# Patient Record
Sex: FEMALE | Race: WHITE | Employment: OTHER | URBAN - METROPOLITAN AREA
[De-identification: names, ages, dates, MRNs, and addresses within clinical notes are randomized per-mention and may not be internally consistent; named-entity substitution may affect disease eponyms.]

---

## 2017-01-12 DIAGNOSIS — F41.9 ANXIETY: Primary | ICD-10-CM

## 2017-01-12 NOTE — TELEPHONE ENCOUNTER
Routing refill request to provider for review/approval because:  Drug not on the [facility] refill protocol     Viktoriya Sharp RN

## 2017-01-12 NOTE — TELEPHONE ENCOUNTER
Lorazepam      Last Written Prescription Date:  11/02/2016  Last Fill Quantity: 30,   # refills: 0  Last Office Visit with [facility], [facility] or [facility] prescribing provider: 10/05/2016  Future Office visit:    Next 5 appointments (look out 90 days)     Jan 12, 2017  3:30 PM   [facility] Visit with JONG Delong   [facility]    [address] [place] [address]   [phone]

## 2017-01-13 RX ORDER — LORAZEPAM 0.5 MG/1
0.5 TABLET ORAL DAILY PRN
Qty: 30 TABLET | Refills: 0 | Status: SHIPPED | OUTPATIENT
Start: 2017-01-13 | End: 2017-05-02

## 2017-05-02 DIAGNOSIS — F41.9 ANXIETY: ICD-10-CM

## 2017-05-02 NOTE — TELEPHONE ENCOUNTER
Lorazepam      Last Written Prescription Date:  01/13/2017  Last Fill Quantity: 30,   # refills: 0  Last Office Visit with [facility] or [facility] prescribing provider: 10/05/2016  Future Office visit:    Next 5 appointments (look out 90 days)     May 08, 2017  3:30 PM CDT   [facility] Visit with Jade [facility]ong   [facility]    [address], [place] [address]   [phone]                   Routing refill request to provider for review/approval because:  Drug not on the [facility], [facility] or [facility] refill protocol or controlled substance    Liang MAXWELL (R)

## 2017-05-03 RX ORDER — LORAZEPAM 0.5 MG/1
0.5 TABLET ORAL DAILY PRN
Qty: 30 TABLET | Refills: 0 | Status: SHIPPED | OUTPATIENT
Start: 2017-05-03 | End: 2018-01-11

## 2017-05-08 ENCOUNTER — OFFICE VISIT (OUTPATIENT)
Dept: AUDIOLOGY | Facility: CLINIC | Age: 82
End: 2017-05-08
Payer: COMMERCIAL

## 2017-05-08 DIAGNOSIS — H90.3 SENSORINEURAL HEARING LOSS, BILATERAL: Primary | ICD-10-CM

## 2017-05-08 PROCEDURE — V5299 HEARING SERVICE: HCPCS | Performed by: AUDIOLOGIST

## 2017-05-08 NOTE — PROGRESS NOTES
84 year old female comes in with her son for a hearing aid recheck. She is currently wearing 2016 Phonak VoodooVox V50-P hearing aids with custom earmolds. Patient reports that her earmold tubing is broken.     Replaced stiff, plugged tubing bilaterally. Reviewed cleaning with patient and her son. Verified hearing aid functionality.  See chart in the hearing aid room.     Return to clinic as needed.    Berna SMALL, #[id number]

## 2017-05-08 NOTE — MR AVS SNAPSHOT
"              After Visit Summary   2017    Sherri Bender    MRN: [id number]           Patient Information     Date Of Birth          1932        Visit Information        Provider Department      2017 3:30 PM Berna Espinoza [facility]        Today's Diagnoses     Sensorineural hearing loss, bilateral    -  1       Follow-ups after your visit        Who to contact     If you have questions or need follow up information about today's clinic visit or your schedule please contact [facility] directly at [phone].  Normal or non-critical lab and imaging results will be communicated to you by Cognitive Codehart, letter or phone within 4 business days after the clinic has received the results. If you do not hear from us within 7 days, please contact the clinic through Cognitive Codehart or phone. If you have a critical or abnormal lab result, we will notify you by phone as soon as possible.  Submit refill requests through Organic Society or call your pharmacy and they will forward the refill request to us. Please allow 3 business days for your refill to be completed.          Additional Information About Your Visit        MyChart Information     Organic Society lets you send messages to your doctor, view your test results, renew your prescriptions, schedule appointments and more. To sign up, go to [email or web address] . Click on \"Log in\" on the left side of the screen, which will take you to the Welcome page. Then click on \"Sign up Now\" on the right side of the page.     You will be asked to enter the access code listed below, as well as some personal information. Please follow the directions to create your username and password.     Your access code is: [email or web address]  Expires: 2017  3:53 PM     Your access code will  in 90 days. If you need help or a new code, please call your [facility] or [phone].        Care EveryWhere ID     This is your Care EveryWhere ID. This could be used " by other organizations to access your [facility] medical records  [id number]         Blood Pressure from Last 3 Encounters:   10/05/16 125/74   11/10/15 142/73   10/27/15 130/78    Weight from Last 3 Encounters:   10/05/16 173 lb (78.5 kg)   11/10/15 172 lb 6.4 oz (78.2 kg)   10/27/15 168 lb 6.4 oz (76.4 kg)              We Performed the Following     HEARING AID CHECK/NO CHARGE        Primary Care Provider Office Phone # Fax #    Kameron Cool [phone]       [facility] [address] [address] [address]        Thank you!     Thank you for choosing [facility]  for your care. Our goal is always to provide you with excellent care. Hearing back from our patients is one way we can continue to improve our services. Please take a few minutes to complete the written survey that you may receive in the mail after your visit with us. Thank you!             Your Updated Medication List - Protect others around you: Learn how to safely use, store and throw away your medicines at www.disposemymeds.org.          This list is accurate as of: 5/8/17  3:53 PM.  Always use your most recent med list.                   Brand Name Dispense Instructions for use    ASPIRIN CAPS 81 MG OR      1 TABLET DAILY       CALCIUM + D PO      1 tablet by mouth daily       * donepezil 5 MG tablet    ARICEPT    30 tablet    Take 1 tablet (5 mg) by mouth At Bedtime (Needs follow-up appointment for this medication)       * donepezil 10 MG tablet    ARICEPT    90 tablet    Take 1 tablet (10 mg) by mouth At Bedtime       FISH OIL PO      1 capsule by mouth daily       GLUCOSAMINE CHONDRO COMPLEX OR      1 CAPSULE ORALLY DAILY       hydrochlorothiazide 25 MG tablet    HYDRODIURIL    90 tablet    Take 1 tablet (25 mg) by mouth daily       LORazepam 0.5 MG tablet    ATIVAN    30 tablet    Take 1 tablet (0.5 mg) by mouth daily as needed for anxiety       losartan 25 MG tablet    COZAAR    90 tablet    Take 1  tablet (25 mg) by mouth daily       MULTIVITAMIN PO      1 TABLET DAILY       sertraline 50 MG tablet    ZOLOFT    90 tablet    Take 1 tablet (50 mg) by mouth daily       tolterodine 2 MG tablet    DETROL    90 tablet    1 tab daily       VITAMIN C PO      500 mg daily       * Notice:  This list has 2 medication(s) that are the same as other medications prescribed for you. Read the directions carefully, and ask your doctor or other care provider to review them with you.

## 2017-08-10 DIAGNOSIS — F41.9 ANXIETY: ICD-10-CM

## 2017-08-10 DIAGNOSIS — F03.A0 MILD DEMENTIA (H): ICD-10-CM

## 2017-08-10 RX ORDER — DONEPEZIL HYDROCHLORIDE 10 MG/1
10 TABLET, FILM COATED ORAL AT BEDTIME
Qty: 90 TABLET | Refills: 0 | Status: SHIPPED | OUTPATIENT
Start: 2017-08-10 | End: 2018-01-11

## 2017-08-10 NOTE — TELEPHONE ENCOUNTER
Sertraline     Last Written Prescription Date: 10/05/2016  Last Fill Quantity: 90, # refills: 3  Last Office Visit with [facility] primary care provider:  10/05/2016   Next 5 appointments (look out 90 days)     Aug 18, 2017  3:20 PM CDT   Office Visit with Kameron Cool MD   [facility]    [address] [address]   [phone]                   Last PHQ-9 score on record=   PHQ-9 SCORE 10/27/2015   Total Score -   Total Score 2     PHQ-9 SCORE 1/30/2015 9/3/2015 10/27/2015   Total Score 0 - -   Total Score - 3 2     MASON-7 SCORE 1/30/2015 9/3/2015 10/27/2015   Total Score 1 - -   Total Score - 1 1       Liang MAXWELL (R)

## 2017-08-10 NOTE — TELEPHONE ENCOUNTER
Donepezil      Last Written Prescription Date: 10/05/2016  Last Fill Quantity: 90, # refills: 3  Last Office Visit with [facility], [facility] or [facility] prescribing provider: 10/05/2016  Next 5 appointments (look out 90 days)     Aug 18, 2017  3:20 PM CDT   Office Visit with Kameron Cool MD   [facility]    [address] [address]   [phone]                   Potassium   Date Value Ref Range Status   09/03/2015 3.6 3.4 - 5.3 mmol/L Final     Creatinine   Date Value Ref Range Status   09/03/2015 0.65 0.52 - 1.04 mg/dL Final     BP Readings from Last 3 Encounters:   10/05/16 125/74   11/10/15 142/73   10/27/15 130/78       Liang MAXWELL (R)

## 2017-08-23 ENCOUNTER — TELEPHONE (OUTPATIENT)
Dept: FAMILY MEDICINE | Facility: CLINIC | Age: 82
End: 2017-08-23

## 2017-08-23 NOTE — TELEPHONE ENCOUNTER
Panel Management Review      Patient has the following on her problem list:     Depression / Dysthymia review  PHQ-9 SCORE 1/30/2015 9/3/2015 10/27/2015   Total Score 0 - -   Total Score - 3 2      Patient is due for:  PHQ9 and DAP    Hypertension   Last three blood pressure readings:  BP Readings from Last 3 Encounters:   10/05/16 125/74   11/10/15 142/73   10/27/15 130/78     Blood pressure: Passed    HTN Guidelines:  Age 18-59 BP range:  Less than 140/90  Age 60-85 with Diabetes:  Less than 140/90  Age 60-85 without Diabetes:  less than 150/90          Composite cancer screening  Chart review shows that this patient is due/due soon for the following None  Summary:    Patient is due/failing the following:   DAP and PHQ9    Action needed:   Patient needs to do PHQ9.    Type of outreach:    Phone, spoke to patient.  she has appt. this week will do it then     Questions for provider review:    None                                                                                                                                    Meron Petit CMA       Chart routed to none .

## 2017-10-03 ENCOUNTER — OFFICE VISIT (OUTPATIENT)
Dept: AUDIOLOGY | Facility: CLINIC | Age: 82
End: 2017-10-03
Payer: COMMERCIAL

## 2017-10-03 DIAGNOSIS — H90.3 SENSORINEURAL HEARING LOSS, BILATERAL: Primary | ICD-10-CM

## 2017-10-03 PROCEDURE — 99207 ZZC NO CHARGE LOS: CPT | Performed by: AUDIOLOGIST

## 2017-10-03 PROCEDURE — V5299 HEARING SERVICE: HCPCS | Performed by: AUDIOLOGIST

## 2017-10-03 NOTE — PROGRESS NOTES
85 year old female comes in with her son for in-office repair of her 2016 Phonak Bolero V50-P hearing aids. Patient reports that the right hearing aid stopped working about 1 month ago.     Replaced earhooks and earmold tubing bilaterally. Verified hearing aid functionality.      An otoscopic examination was done and revealed cerumen impaction.     Recommend ENT removal of cermen.     See chart in the hearing aid room.     Berna TYLER-AGUEDA, #[id number]

## 2017-10-03 NOTE — MR AVS SNAPSHOT
"              After Visit Summary   10/3/2017    Sherri Bender    MRN: [id number]           Patient Information     Date Of Birth          6/6/1932        Visit Information        Provider Department      10/3/2017 2:00 PM Berna Espinoza [facility]D [facility]        Today's Diagnoses     Sensorineural hearing loss, bilateral    -  1       Follow-ups after your visit        Your next 10 appointments already scheduled     Oct 11, 2017  2:15 PM CDT   New Visit with Giana Goodrich MD   [facility]    [address] [address]   [phone]              Who to contact     If you have questions or need follow up information about today's clinic visit or your schedule please contact [facility] directly at [phone].  Normal or non-critical lab and imaging results will be communicated to you by MyChart, letter or phone within 4 business days after the clinic has received the results. If you do not hear from us within 7 days, please contact the clinic through MyChart or phone. If you have a critical or abnormal lab result, we will notify you by phone as soon as possible.  Submit refill requests through GupShup or call your pharmacy and they will forward the refill request to us. Please allow 3 business days for your refill to be completed.          Additional Information About Your Visit        MyChart Information     GupShup lets you send messages to your doctor, view your test results, renew your prescriptions, schedule appointments and more. To sign up, go to [email or web address] . Click on \"Log in\" on the left side of the screen, which will take you to the Welcome page. Then click on \"Sign up Now\" on the right side of the page.     You will be asked to enter the access code listed below, as well as some personal information. Please follow the directions to create your username and password.     Your access code is: " [email or web address]  Expires: 2018  4:40 PM     Your access code will  in 90 days. If you need help or a new code, please call your [facility] clinic or [phone].        Care EveryWhere ID     This is your Care EveryWhere ID. This could be used by other organizations to access your [facility] medical records  [id number]         Blood Pressure from Last 3 Encounters:   10/05/16 125/74   11/10/15 142/73   10/27/15 130/78    Weight from Last 3 Encounters:   10/05/16 173 lb (78.5 kg)   11/10/15 172 lb 6.4 oz (78.2 kg)   10/27/15 168 lb 6.4 oz (76.4 kg)              We Performed the Following     HEARING AID CHECK/NO CHARGE        Primary Care Provider Office Phone # Fax #    Kameron Cool [phone] [address] [address]        Equal Access to Services     [facility] : Hadii aad ku hadasho Soomaali, waaxda luqadaha, qaybta kaalmada adeegyada, waxay idiin hayaan luis meg khmaria antonia lalinda . So [facility] [phone].    ATENCIÓN: Si habla español, tiene a osullivan disposición servicios gratuitos de asistencia lingüística. Llame al [phone].    We comply with applicable federal civil rights laws and [place] laws. We do not discriminate on the basis of race, color, national origin, age, disability, sex, sexual orientation, or gender identity.            Thank you!     Thank you for choosing [facility]  for your care. Our goal is always to provide you with excellent care. Hearing back from our patients is one way we can continue to improve our services. Please take a few minutes to complete the written survey that you may receive in the mail after your visit with us. Thank you!             Your Updated Medication List - Protect others around you: Learn how to safely use, store and throw away your medicines at www.disposemymeds.org.          This list is accurate as of: 10/3/17  4:40 PM.  Always use your most recent med list.                   Brand Name Dispense  Instructions for use Diagnosis    ASPIRIN CAPS 81 MG OR      1 TABLET DAILY        CALCIUM + D PO      1 tablet by mouth daily        * donepezil 5 MG tablet    ARICEPT    30 tablet    Take 1 tablet (5 mg) by mouth At Bedtime (Needs follow-up appointment for this medication)    Mild dementia       * donepezil 10 MG tablet    ARICEPT    90 tablet    Take 1 tablet (10 mg) by mouth At Bedtime    Mild dementia       FISH OIL PO      1 capsule by mouth daily        GLUCOSAMINE CHONDRO COMPLEX OR      1 CAPSULE ORALLY DAILY        hydrochlorothiazide 25 MG tablet    HYDRODIURIL    90 tablet    Take 1 tablet (25 mg) by mouth daily    Edema       LORazepam 0.5 MG tablet    ATIVAN    30 tablet    Take 1 tablet (0.5 mg) by mouth daily as needed for anxiety    Anxiety       losartan 25 MG tablet    COZAAR    90 tablet    Take 1 tablet (25 mg) by mouth daily    HTN, goal below 150/90       MULTIVITAMIN PO      1 TABLET DAILY        sertraline 50 MG tablet    ZOLOFT    30 tablet    Take 1 tablet (50 mg) by mouth daily (Needs follow-up appointment for this medication)    Anxiety       tolterodine 2 MG tablet    DETROL    90 tablet    1 tab daily    Irritable bladder       VITAMIN C PO      500 mg daily        * Notice:  This list has 2 medication(s) that are the same as other medications prescribed for you. Read the directions carefully, and ask your doctor or other care provider to review them with you.

## 2017-10-11 ENCOUNTER — OFFICE VISIT (OUTPATIENT)
Dept: OTOLARYNGOLOGY | Facility: CLINIC | Age: 82
End: 2017-10-11
Payer: COMMERCIAL

## 2017-10-11 VITALS
TEMPERATURE: 97.8 F | SYSTOLIC BLOOD PRESSURE: 136 MMHG | DIASTOLIC BLOOD PRESSURE: 82 MMHG | HEART RATE: 79 BPM | HEIGHT: 63 IN | WEIGHT: 173 LBS | BODY MASS INDEX: 30.65 KG/M2

## 2017-10-11 DIAGNOSIS — H90.3 BILATERAL SENSORINEURAL HEARING LOSS: ICD-10-CM

## 2017-10-11 DIAGNOSIS — H61.23 BILATERAL IMPACTED CERUMEN: Primary | ICD-10-CM

## 2017-10-11 PROCEDURE — 69210 REMOVE IMPACTED EAR WAX UNI: CPT | Performed by: OTOLARYNGOLOGY

## 2017-10-11 PROCEDURE — 99203 OFFICE O/P NEW LOW 30 MIN: CPT | Mod: 25 | Performed by: OTOLARYNGOLOGY

## 2017-10-11 ASSESSMENT — PAIN SCALES - GENERAL: PAINLEVEL: NO PAIN (0)

## 2017-10-11 NOTE — NURSING NOTE
"Initial /82 (BP Location: Right arm, Patient Position: Sitting, Cuff Size: Adult Regular)  Pulse 79  Temp 97.8  F (36.6  C) (Oral)  Ht 1.607 m (5' 3.25\")  Wt 78.5 kg (173 lb)  BMI 30.4 kg/m2 Estimated body mass index is 30.4 kg/(m^2) as calculated from the following:    Height as of this encounter: 1.607 m (5' 3.25\").    Weight as of this encounter: 78.5 kg (173 lb). .    Silvana Calderón CMA    "

## 2017-10-11 NOTE — PROGRESS NOTES
History of Present Illness - Sherri Bender is a 85 year old female who has known bilateral sensorineural hearing loss, presented recently to Dr. Espinoza in Audiology for hearing evaluation. She was found to have bilateral cerumen impactions, which she felt were interfering with her ability to use her hearing aids well. She denies any ear pain or otorrhea.    Past Medical History -   Patient Active Problem List   Diagnosis     SENSONRL HEAR LOSS,BILAT     Osteopenia     Varicose vein of leg     Loss of height     Family history of ischemic heart disease     S/P lumpectomy of breast     CARDIOVASCULAR SCREENING; LDL GOAL LESS THAN 160     [place] (hard of hearing)     Anxiety     Advanced directives, counseling/discussion     Mild major depression (H)     HTN, goal below 150/90     Memory changes     Mild dementia       Current Medications -   Current Outpatient Prescriptions:      sertraline (ZOLOFT) 50 MG tablet, Take 1 tablet (50 mg) by mouth daily (Needs follow-up appointment for this medication), Disp: 30 tablet, Rfl: 0     donepezil (ARICEPT) 10 MG tablet, Take 1 tablet (10 mg) by mouth At Bedtime, Disp: 90 tablet, Rfl: 0     LORazepam (ATIVAN) 0.5 MG tablet, Take 1 tablet (0.5 mg) by mouth daily as needed for anxiety, Disp: 30 tablet, Rfl: 0     losartan (COZAAR) 25 MG tablet, Take 1 tablet (25 mg) by mouth daily, Disp: 90 tablet, Rfl: 3     hydrochlorothiazide (HYDRODIURIL) 25 MG tablet, Take 1 tablet (25 mg) by mouth daily, Disp: 90 tablet, Rfl: 3     donepezil (ARICEPT) 5 MG tablet, Take 1 tablet (5 mg) by mouth At Bedtime (Needs follow-up appointment for this medication), Disp: 30 tablet, Rfl: 0     tolterodine (DETROL) 2 MG tablet, 1 tab daily, Disp: 90 tablet, Rfl: 0     VITAMIN C OR, 500 mg daily, Disp: , Rfl:      FISH OIL OR, 1 capsule by mouth daily, Disp: , Rfl:      CALCIUM + D OR, 1 tablet by mouth daily, Disp: , Rfl:      ASPIRIN CAPS 81 MG OR, 1 TABLET DAILY, Disp: , Rfl:       "MULTIVITAMIN OR, 1 TABLET DAILY, Disp: , Rfl:      GLUCOSAMINE CHONDRO COMPLEX OR, 1 CAPSULE ORALLY DAILY, Disp: , Rfl:     Allergies -   Allergies   Allergen Reactions     Nkda [No Known Drug Allergies]        Social History -   Social History     Social History     Marital status:      Spouse name: N/A     Number of children: N/A     Years of education: N/A     Social History Main Topics     Smoking status: Former Smoker     Smokeless tobacco: Never Used      Comment: 1972     Alcohol use Yes      Comment: rarely     Drug use: No     Sexual activity: No     Other Topics Concern     Parent/Sibling W/ Cabg, Mi Or Angioplasty Before 65f 55m? No     Social History Narrative       Family History -   Family History   Problem Relation Age of Onset     CANCER Mother      skin     HEART DISEASE Mother      HEART DISEASE Father      Allergies Brother      CANCER Brother      lung       Review of Systems - As per HPI and PMHx, otherwise 7 system review of the head and neck negative. 10+ system review negative.    Physical Exam  /82 (BP Location: Right arm, Patient Position: Sitting, Cuff Size: Adult Regular)  Pulse 79  Temp 97.8  F (36.6  C) (Oral)  Ht 1.607 m (5' 3.25\")  Wt 78.5 kg (173 lb)  BMI 30.4 kg/m2  General - The patient is well nourished and well developed, and appears to have good nutritional status.  Alert and oriented to person and place, answers questions and cooperates with examination appropriately.   Head and Face - Normocephalic and atraumatic, with no gross asymmetry noted of the contour of the facial features.  The facial nerve is intact, with strong symmetric movements.  Voice and Breathing - The patient was breathing comfortably without the use of accessory muscles. There was no wheezing, stridor, or stertor.  The patients voice was clear and strong, and had appropriate pitch and quality.  Ears - Bilateral pinna and EACs with normal appearing overlying skin. Tympanic membrane intact " with good mobility on pneumatic otoscopy bilaterally. Bony landmarks of the ossicular chain are normal. The tympanic membranes are normal in appearance. No retraction, perforation, or masses.  No fluid or purulence was seen in the external canal or the middle ear. Bilateral cerumen impactions.  Eyes - Extraocular movements intact.  Sclera were not icteric or injected, conjunctiva were pink and moist.  Mouth - Examination of the oral cavity showed pink, healthy oral mucosa. No lesions or ulcerations noted.  The tongue was mobile and midline, and the dentition were in good condition.    Throat - The walls of the oropharynx were smooth, pink, moist, symmetric, and had no lesions or ulcerations.  The tonsillar pillars and soft palate were symmetric.  The uvula was midline on elevation.  Neck - Normal midline excursion of the laryngotracheal complex during swallowing.  Full range of motion on passive movement.  Palpation of the occipital, submental, submandibular, internal jugular chain, and supraclavicular nodes did not demonstrate any abnormal lymph nodes or masses.  The carotid pulse was palpable bilaterally.  Palpation of the thyroid was soft and smooth, with no nodules or goiter appreciated.  The trachea was mobile and midline.  Nose - External contour is symmetric, no gross deflection or scars.  Nasal mucosa is pink and moist with no abnormal mucus.  The septum was midline and non-obstructive, turbinates of normal size and position.  No polyps, masses, or purulence noted on examination.    Procedure: Cerumen Disimpaction  Diagnosis: Cerumen Impaction    Procedure and Findings  Ears - On examination of the ears, I found that the  ears were impacted with dense cerumen obscuring visualization of the right and left TM.  Therefore, I positioned the patient and I used the binocular surgical microscope to assist in visualization of the affected ear(s).  I began by using a cerumen loop to gently lift the edges of the  cerumen mass away from the walls of the external canal. Once the mass was loose enough, the entire plug was pulled from the canal with microforceps.  The tympanic membrane was intact without sign of perforation or middle ear effusion and minimal ear canal trauma.      Assessment - Sherri Bender is a 85 year old female with bilateral cerumen impaction that was removed. After this, TM was visualized and appears normal. Patient was not sure if her hearing was improved or not, but after placing hearing aids, she was able to hearing the test beeps, which she couldn't hear before.  Advised patient to return to clinic should she have any future issues with her hearing aids or decreases in hearing.     This document serves as a record of the services and decisions personally performed and made by Dr. Giana Goodrich MD. It was created on his behalf by Noemy Urrutia, a trained medical scribe. The creation of this document is based the provider's statements to the medical scribe.  Noemy Urrutia 2:38 PM 10/11/2017    Provider:   The information in this document, created by the medical scribe for me, accurately reflects the services I personally performed and the decisions made by me. I have reviewed and approved this document for accuracy prior to leaving the patient care area.  Dr. Giana Goodrich MD 2:38 PM 10/11/2017    Dr. Giana Goodrich MD  Otolaryngology  [facility]

## 2017-10-11 NOTE — MR AVS SNAPSHOT
"              After Visit Summary   10/11/2017    Sherri Bender    MRN: [id number]           Patient Information     Date Of Birth          1932        Visit Information        Provider Department      10/11/2017 2:15 PM Giana Goodrich MD [facility]        Today's Diagnoses     Bilateral impacted cerumen    -  1    Bilateral sensorineural hearing loss          Care Instructions    Per Physician's instructions            Follow-ups after your visit        Who to contact     If you have questions or need follow up information about today's clinic visit or your schedule please contact [facility] directly at [phone].  Normal or non-critical lab and imaging results will be communicated to you by MyChart, letter or phone within 4 business days after the clinic has received the results. If you do not hear from us within 7 days, please contact the clinic through [employer] or phone. If you have a critical or abnormal lab result, we will notify you by phone as soon as possible.  Submit refill requests through [employer] or call your pharmacy and they will forward the refill request to us. Please allow 3 business days for your refill to be completed.          Additional Information About Your Visit        MyChart Information     [employer] lets you send messages to your doctor, view your test results, renew your prescriptions, schedule appointments and more. To sign up, go to [email or web address] . Click on \"Log in\" on the left side of the screen, which will take you to the Welcome page. Then click on \"Sign up Now\" on the right side of the page.     You will be asked to enter the access code listed below, as well as some personal information. Please follow the directions to create your username and password.     Your access code is: [email or web address]  Expires: 2018  4:40 PM     Your access code will  in 90 days. If you need help or a new code, please call your [facility] or " "[phone].        Care EveryWhere ID     This is your Care EveryWhere ID. This could be used by other organizations to access your [facility] medical records  [id number]        Your Vitals Were     Pulse Temperature Height BMI (Body Mass Index)          79 97.8  F (36.6  C) (Oral) 1.607 m (5' 3.25\") 30.4 kg/m2         Blood Pressure from Last 3 Encounters:   10/11/17 136/82   10/05/16 125/74   11/10/15 142/73    Weight from Last 3 Encounters:   10/11/17 78.5 kg (173 lb)   10/05/16 78.5 kg (173 lb)   11/10/15 78.2 kg (172 lb 6.4 oz)              We Performed the Following     Remove [facility]        Primary Care Provider Office Phone # Fax #    Kameron Cool [phone] [address] [facility] [address]        Equal Access to Services     [facility]: Hadii aad ku hadasho Soomaali, waaxda luqadaha, qaybta kaalmada adeegyada, waxay idiin haymisaeln joao moctezuma . So [facility] [phone].    ATENCIÓN: Si habla español, tiene a osullivan disposición servicios gratuitos de asistencia lingüística. Llame al [phone].    We comply with applicable federal civil rights laws and [place] laws. We do not discriminate on the basis of race, color, national origin, age, disability, sex, sexual orientation, or gender identity.            Thank you!     Thank you for choosing [facility]  for your care. Our goal is always to provide you with excellent care. Hearing back from our patients is one way we can continue to improve our services. Please take a few minutes to complete the written survey that you may receive in the mail after your visit with us. Thank you!             Your Updated Medication List - Protect others around you: Learn how to safely use, store and throw away your medicines at www.disposemymeds.org.          This list is accurate as of: 10/11/17 11:59 PM.  Always use your most recent med list.                   Brand Name Dispense Instructions for use Diagnosis    " ASPIRIN CAPS 81 MG OR      1 TABLET DAILY        CALCIUM + D PO      1 tablet by mouth daily        * donepezil 5 MG tablet    ARICEPT    30 tablet    Take 1 tablet (5 mg) by mouth At Bedtime (Needs follow-up appointment for this medication)    Mild dementia       * donepezil 10 MG tablet    ARICEPT    90 tablet    Take 1 tablet (10 mg) by mouth At Bedtime    Mild dementia       FISH OIL PO      1 capsule by mouth daily        GLUCOSAMINE CHONDRO COMPLEX OR      1 CAPSULE ORALLY DAILY        hydrochlorothiazide 25 MG tablet    HYDRODIURIL    90 tablet    Take 1 tablet (25 mg) by mouth daily    Edema       LORazepam 0.5 MG tablet    ATIVAN    30 tablet    Take 1 tablet (0.5 mg) by mouth daily as needed for anxiety    Anxiety       losartan 25 MG tablet    COZAAR    90 tablet    Take 1 tablet (25 mg) by mouth daily    HTN, goal below 150/90       MULTIVITAMIN PO      1 TABLET DAILY        sertraline 50 MG tablet    ZOLOFT    30 tablet    Take 1 tablet (50 mg) by mouth daily (Needs follow-up appointment for this medication)    Anxiety       tolterodine 2 MG tablet    DETROL    90 tablet    1 tab daily    Irritable bladder       VITAMIN C PO      500 mg daily        * Notice:  This list has 2 medication(s) that are the same as other medications prescribed for you. Read the directions carefully, and ask your doctor or other care provider to review them with you.

## 2017-10-27 DIAGNOSIS — R60.9 EDEMA: ICD-10-CM

## 2017-10-27 DIAGNOSIS — I10 HTN, GOAL BELOW 150/90: ICD-10-CM

## 2017-10-27 NOTE — TELEPHONE ENCOUNTER
Losartan and HCTZ      Last Written Prescription Date: 10/05/2016  Last Fill Quantity: 90, # refills: 3  Last Office Visit with G, P or [facility] prescribing provider: 10/05/2016       Potassium   Date Value Ref Range Status   09/03/2015 3.6 3.4 - 5.3 mmol/L Final     Creatinine   Date Value Ref Range Status   09/03/2015 0.65 0.52 - 1.04 mg/dL Final     BP Readings from Last 3 Encounters:   10/11/17 136/82   10/05/16 125/74   11/10/15 142/73     Liang MAXWELL (R)

## 2017-10-31 RX ORDER — LOSARTAN POTASSIUM 25 MG/1
25 TABLET ORAL DAILY
Qty: 30 TABLET | Refills: 0 | Status: SHIPPED | OUTPATIENT
Start: 2017-10-31 | End: 2018-01-11

## 2017-10-31 RX ORDER — HYDROCHLOROTHIAZIDE 25 MG/1
25 TABLET ORAL DAILY
Qty: 30 TABLET | Refills: 0 | Status: SHIPPED | OUTPATIENT
Start: 2017-10-31 | End: 2018-01-11

## 2018-01-11 ENCOUNTER — NURSE TRIAGE (OUTPATIENT)
Dept: NURSING | Facility: CLINIC | Age: 83
End: 2018-01-11

## 2018-01-11 ENCOUNTER — TELEPHONE (OUTPATIENT)
Dept: FAMILY MEDICINE | Facility: CLINIC | Age: 83
End: 2018-01-11

## 2018-01-11 DIAGNOSIS — I10 HTN, GOAL BELOW 150/90: ICD-10-CM

## 2018-01-11 DIAGNOSIS — F03.A0 MILD DEMENTIA (H): ICD-10-CM

## 2018-01-11 DIAGNOSIS — R60.9 EDEMA, UNSPECIFIED TYPE: ICD-10-CM

## 2018-01-11 DIAGNOSIS — F41.9 ANXIETY: ICD-10-CM

## 2018-01-11 NOTE — TELEPHONE ENCOUNTER
Daughter Bear is calling and has concerns about getting mother to clinic.  Dementia has become so bad that daughter Bear has been up 24 hours for two days.  Bear feels that mom is sundowning.  Sherri is becoming weak and shaky and is having troubles getting to bathroom.  Bear is needing to keep phones turned down or   off as it is upsetting to Sherri.    Bear is requesting to speak with MD Kameron Cool regarding this situation and what can be done in terms of help with Sherri.  Please phone Bear at [phone] or on Bear's cell at [phone].

## 2018-01-11 NOTE — TELEPHONE ENCOUNTER
"Refill request for Aricept, Cozaar, HCTZ, and Ativan.  Spoke with daughter.  Unable to bring pt to appt tomorrow with the weather.  Scheduled appt on 1/15/18.  Pt is \"sundowning\" and getting more incontinent.  Discussed possible UTI?  Will bring pt to ER if symptoms worsen.  Advise on refills.  KpavelRN      "

## 2018-01-11 NOTE — TELEPHONE ENCOUNTER
Clinic Action Needed:  Yes, callback  [facility] Triage Call  Presenting Problem:    Daughter Bear is calling and has concerns about getting mother to clinic.  Dementia has become so bad that daughter Bear has been up 24 hours for two days.  Bear feels that mom is sundowning.  Sherri is becoming weak and shaky and is having troubles getting to bathroom.  Bear is needing to keep phones turned down or   off as it is upsetting to Sherri.    Bear is requesting to speak with MD Kameron Cool regarding this situation and what can be done in terms of help with Sherri.  Please phone Bear at [phone] or on Bear's cell at [phone].    Routed to:  RN Pool  Please be sure to close this encounter once this patient's issue/question has been addressed.    Lo Power RN/[facility]

## 2018-01-12 RX ORDER — LOSARTAN POTASSIUM 25 MG/1
25 TABLET ORAL DAILY
Qty: 30 TABLET | Refills: 0 | Status: SHIPPED | OUTPATIENT
Start: 2018-01-12 | End: 2018-08-02

## 2018-01-12 RX ORDER — LORAZEPAM 0.5 MG/1
0.5 TABLET ORAL DAILY PRN
Qty: 30 TABLET | Refills: 0 | Status: ON HOLD | OUTPATIENT
Start: 2018-01-12 | End: 2018-01-15

## 2018-01-12 RX ORDER — HYDROCHLOROTHIAZIDE 25 MG/1
25 TABLET ORAL DAILY
Qty: 30 TABLET | Refills: 0 | Status: SHIPPED | OUTPATIENT
Start: 2018-01-12 | End: 2018-01-23

## 2018-01-12 RX ORDER — DONEPEZIL HYDROCHLORIDE 10 MG/1
10 TABLET, FILM COATED ORAL AT BEDTIME
Qty: 30 TABLET | Refills: 0 | Status: SHIPPED | OUTPATIENT
Start: 2018-01-12 | End: 2018-01-23

## 2018-01-14 ENCOUNTER — APPOINTMENT (OUTPATIENT)
Dept: CT IMAGING | Facility: CLINIC | Age: 83
DRG: 057 | End: 2018-01-14
Attending: EMERGENCY MEDICINE
Payer: MEDICARE

## 2018-01-14 ENCOUNTER — APPOINTMENT (OUTPATIENT)
Dept: GENERAL RADIOLOGY | Facility: CLINIC | Age: 83
DRG: 057 | End: 2018-01-14
Attending: EMERGENCY MEDICINE
Payer: MEDICARE

## 2018-01-14 ENCOUNTER — HOSPITAL ENCOUNTER (INPATIENT)
Facility: CLINIC | Age: 83
LOS: 1 days | Discharge: NURSING, REHAB OR LONG-TERM CARE | DRG: 057 | End: 2018-01-17
Attending: EMERGENCY MEDICINE | Admitting: INTERNAL MEDICINE
Payer: MEDICARE

## 2018-01-14 DIAGNOSIS — S00.83XA CONTUSION, CHEEK, INITIAL ENCOUNTER: ICD-10-CM

## 2018-01-14 DIAGNOSIS — R29.6 FALLS FREQUENTLY: ICD-10-CM

## 2018-01-14 DIAGNOSIS — F03.90 SENILE DEMENTIA, UNCOMPLICATED (H): ICD-10-CM

## 2018-01-14 DIAGNOSIS — Z86.59 HISTORY OF DEMENTIA: ICD-10-CM
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
## 2018-01-14 LAB
ALBUMIN SERPL-MCNC: 3.3 G/DL (ref 3.4–5)
ALBUMIN UR-MCNC: NEGATIVE MG/DL
ALP SERPL-CCNC: 67 U/L (ref 40–150)
ALT SERPL W P-5'-P-CCNC: 19 U/L (ref 0–50)
ANION GAP SERPL CALCULATED.3IONS-SCNC: 10 MMOL/L (ref 3–14)
APPEARANCE UR: CLEAR
AST SERPL W P-5'-P-CCNC: 18 U/L (ref 0–45)
BASOPHILS # BLD AUTO: 0 10E9/L (ref 0–0.2)
BASOPHILS NFR BLD AUTO: 0.2 %
BILIRUB SERPL-MCNC: 0.3 MG/DL (ref 0.2–1.3)
BILIRUB UR QL STRIP: NEGATIVE
BUN SERPL-MCNC: 17 MG/DL (ref 7–30)
CALCIUM SERPL-MCNC: 8.6 MG/DL (ref 8.5–10.1)
CHLORIDE SERPL-SCNC: 106 MMOL/L (ref 94–109)
CK SERPL-CCNC: 180 U/L (ref 30–225)
CO2 SERPL-SCNC: 24 MMOL/L (ref 20–32)
COLOR UR AUTO: YELLOW
CREAT SERPL-MCNC: 0.82 MG/DL (ref 0.52–1.04)
DIFFERENTIAL METHOD BLD: ABNORMAL
EOSINOPHIL # BLD AUTO: 0.1 10E9/L (ref 0–0.7)
EOSINOPHIL NFR BLD AUTO: 0.7 %
ERYTHROCYTE [DISTWIDTH] IN BLOOD BY AUTOMATED COUNT: 13 % (ref 10–15)
GFR SERPL CREATININE-BSD FRML MDRD: 66 ML/MIN/1.7M2
GLUCOSE SERPL-MCNC: 142 MG/DL (ref 70–99)
GLUCOSE UR STRIP-MCNC: NEGATIVE MG/DL
HCT VFR BLD AUTO: 35.3 % (ref 35–47)
HGB BLD-MCNC: 11.7 G/DL (ref 11.7–15.7)
HGB UR QL STRIP: NEGATIVE
IMM GRANULOCYTES # BLD: 0 10E9/L (ref 0–0.4)
IMM GRANULOCYTES NFR BLD: 0.1 %
KETONES UR STRIP-MCNC: NEGATIVE MG/DL
LEUKOCYTE ESTERASE UR QL STRIP: NEGATIVE
LYMPHOCYTES # BLD AUTO: 1.3 10E9/L (ref 0.8–5.3)
LYMPHOCYTES NFR BLD AUTO: 15.4 %
MCH RBC QN AUTO: 30.9 PG (ref 26.5–33)
MCHC RBC AUTO-ENTMCNC: 33.1 G/DL (ref 31.5–36.5)
MCV RBC AUTO: 93 FL (ref 78–100)
MONOCYTES # BLD AUTO: 0.6 10E9/L (ref 0–1.3)
MONOCYTES NFR BLD AUTO: 7 %
NEUTROPHILS # BLD AUTO: 6.3 10E9/L (ref 1.6–8.3)
NEUTROPHILS NFR BLD AUTO: 76.6 %
NITRATE UR QL: NEGATIVE
PH UR STRIP: 5 PH (ref 5–7)
PLATELET # BLD AUTO: 226 10E9/L (ref 150–450)
POTASSIUM SERPL-SCNC: 3.6 MMOL/L (ref 3.4–5.3)
PROT SERPL-MCNC: 6.6 G/DL (ref 6.8–8.8)
RBC # BLD AUTO: 3.79 10E12/L (ref 3.8–5.2)
SODIUM SERPL-SCNC: 140 MMOL/L (ref 133–144)
SOURCE: NORMAL
SP GR UR STRIP: 1.01 (ref 1–1.03)
UROBILINOGEN UR STRIP-MCNC: NORMAL MG/DL (ref 0–2)
WBC # BLD AUTO: 8.2 10E9/L (ref 4–11)

## 2018-01-14 PROCEDURE — 25000132 ZZH RX MED GY IP 250 OP 250 PS 637: Mod: GY | Performed by: INTERNAL MEDICINE

## 2018-01-14 PROCEDURE — A9270 NON-COVERED ITEM OR SERVICE: HCPCS | Mod: GY | Performed by: INTERNAL MEDICINE

## 2018-01-14 PROCEDURE — 99285 EMERGENCY DEPT VISIT HI MDM: CPT | Mod: 25

## 2018-01-14 PROCEDURE — 80053 COMPREHEN METABOLIC PANEL: CPT | Performed by: EMERGENCY MEDICINE

## 2018-01-14 PROCEDURE — 99285 EMERGENCY DEPT VISIT HI MDM: CPT | Mod: 25 | Performed by: EMERGENCY MEDICINE

## 2018-01-14 PROCEDURE — 85025 COMPLETE CBC W/AUTO DIFF WBC: CPT | Performed by: EMERGENCY MEDICINE

## 2018-01-14 PROCEDURE — 82550 ASSAY OF CK (CPK): CPT | Performed by: INTERNAL MEDICINE

## 2018-01-14 PROCEDURE — G0378 HOSPITAL OBSERVATION PER HR: HCPCS

## 2018-01-14 PROCEDURE — 99220 ZZC INITIAL OBSERVATION CARE,LEVL III: CPT | Performed by: INTERNAL MEDICINE

## 2018-01-14 PROCEDURE — 70450 CT HEAD/BRAIN W/O DYE: CPT

## 2018-01-14 PROCEDURE — 73502 X-RAY EXAM HIP UNI 2-3 VIEWS: CPT

## 2018-01-14 PROCEDURE — 72125 CT NECK SPINE W/O DYE: CPT

## 2018-01-14 PROCEDURE — 81003 URINALYSIS AUTO W/O SCOPE: CPT | Performed by: EMERGENCY MEDICINE

## 2018-01-14 RX ORDER — LACTOSE-REDUCED FOOD
1 LIQUID (ML) ORAL 3 TIMES DAILY
Status: DISCONTINUED | OUTPATIENT
Start: 2018-01-14 | End: 2018-01-17 | Stop reason: HOSPADM

## 2018-01-14 RX ORDER — NALOXONE HYDROCHLORIDE 0.4 MG/ML
.1-.4 INJECTION, SOLUTION INTRAMUSCULAR; INTRAVENOUS; SUBCUTANEOUS
Status: DISCONTINUED | OUTPATIENT
Start: 2018-01-14 | End: 2018-01-14

## 2018-01-14 RX ORDER — ACETAMINOPHEN 650 MG/1
650 SUPPOSITORY RECTAL EVERY 4 HOURS PRN
Status: DISCONTINUED | OUTPATIENT
Start: 2018-01-14 | End: 2018-01-17 | Stop reason: HOSPADM

## 2018-01-14 RX ORDER — NALOXONE HYDROCHLORIDE 0.4 MG/ML
.1-.4 INJECTION, SOLUTION INTRAMUSCULAR; INTRAVENOUS; SUBCUTANEOUS
Status: DISCONTINUED | OUTPATIENT
Start: 2018-01-14 | End: 2018-01-17 | Stop reason: HOSPADM

## 2018-01-14 RX ORDER — DONEPEZIL HYDROCHLORIDE 10 MG/1
10 TABLET, FILM COATED ORAL AT BEDTIME
Status: DISCONTINUED | OUTPATIENT
Start: 2018-01-14 | End: 2018-01-17 | Stop reason: HOSPADM

## 2018-01-14 RX ORDER — ONDANSETRON 2 MG/ML
4 INJECTION INTRAMUSCULAR; INTRAVENOUS EVERY 6 HOURS PRN
Status: DISCONTINUED | OUTPATIENT
Start: 2018-01-14 | End: 2018-01-17 | Stop reason: HOSPADM

## 2018-01-14 RX ORDER — ONDANSETRON 4 MG/1
4 TABLET, ORALLY DISINTEGRATING ORAL EVERY 6 HOURS PRN
Status: DISCONTINUED | OUTPATIENT
Start: 2018-01-14 | End: 2018-01-17 | Stop reason: HOSPADM

## 2018-01-14 RX ORDER — ONDANSETRON 4 MG/1
4 TABLET, ORALLY DISINTEGRATING ORAL EVERY 6 HOURS PRN
Status: DISCONTINUED | OUTPATIENT
Start: 2018-01-14 | End: 2018-01-14

## 2018-01-14 RX ORDER — LORATADINE 10 MG/1
10 TABLET ORAL DAILY
COMMUNITY
End: 2018-08-02

## 2018-01-14 RX ORDER — RISPERIDONE 0.25 MG/1
0.25 TABLET ORAL AT BEDTIME
Status: DISCONTINUED | OUTPATIENT
Start: 2018-01-14 | End: 2018-01-16

## 2018-01-14 RX ORDER — ONDANSETRON 2 MG/ML
4 INJECTION INTRAMUSCULAR; INTRAVENOUS EVERY 6 HOURS PRN
Status: DISCONTINUED | OUTPATIENT
Start: 2018-01-14 | End: 2018-01-14

## 2018-01-14 RX ORDER — LACTOSE-REDUCED FOOD
LIQUID (ML) ORAL PRN
COMMUNITY
End: 2018-01-22

## 2018-01-14 RX ORDER — ACETAMINOPHEN 325 MG/1
650 TABLET ORAL EVERY 4 HOURS PRN
Status: DISCONTINUED | OUTPATIENT
Start: 2018-01-14 | End: 2018-01-14

## 2018-01-14 RX ORDER — LOSARTAN POTASSIUM 25 MG/1
25 TABLET ORAL DAILY
Status: DISCONTINUED | OUTPATIENT
Start: 2018-01-15 | End: 2018-01-17 | Stop reason: HOSPADM

## 2018-01-14 RX ORDER — ACETAMINOPHEN 325 MG/1
650 TABLET ORAL EVERY 4 HOURS PRN
Status: DISCONTINUED | OUTPATIENT
Start: 2018-01-14 | End: 2018-01-17 | Stop reason: HOSPADM

## 2018-01-14 RX ADMIN — RISPERIDONE 0.25 MG: 0.25 TABLET ORAL at 21:37

## 2018-01-14 RX ADMIN — DONEPEZIL HYDROCHLORIDE 10 MG: 10 TABLET, FILM COATED ORAL at 21:37

## 2018-01-14 ASSESSMENT — ENCOUNTER SYMPTOMS
CARDIOVASCULAR NEGATIVE: 1
ALLERGIC/IMMUNOLOGIC NEGATIVE: 1
RESPIRATORY NEGATIVE: 1
WEAKNESS: 1
HEMATOLOGIC/LYMPHATIC NEGATIVE: 1
GASTROINTESTINAL NEGATIVE: 1
ENDOCRINE NEGATIVE: 1
PSYCHIATRIC NEGATIVE: 1
EYES NEGATIVE: 1
MUSCULOSKELETAL NEGATIVE: 1

## 2018-01-14 NOTE — ED PROVIDER NOTES
History     Chief Complaint   Patient presents with     Fall     unknown time this AM, pt was found on stomach by family.  pt has had multiple falls recently.  dementia at baseline, failure to thrive at home recently - does not want to eat or drink.  no c/o pain.     HPI  Sherri Bender is a 85 year old female with history of osteopenia, varicose veins of leg, mild dementia, and mild major depression who presents to the ED after a fall. Patient's daughter assisted with history. Her daughter reports she was with her at 4:00 am and the patient's son got home at 7:00 am and found her laying on her stomach on the floor of her bedroom. Her daughter reports the floor was carpet, but there was a wooden chair stuck under her head. Patient told her daughter her face hurt. Her daughter noticed she was walking with her right leg turned in a dragging behind her leg leg after the fall. Patient's daughter reports she has fallen 3-4 times before with her and never remembers it. Her daughter reports she gets up to go to the bathroom often on her own, refusing to use a walker, with her falls typically occurring when she is lunging between furniture to hold on to. Patient's daugher reports her demenia has been worsening significantly over the past 2 months. Patient daughter said she does not want to take her back home and would like to set up an arrangement for her to be in a memory care center at a nursing home.     Social History: Patient lives in [place], [place]. Patient is with her daughter.     Past Medical History:  Past Medical History:   Diagnosis Date     Environmental allergies     spring, fall     Menopause age 45     Osteoarthritis      Pneumonia 1960s       Medications:  Current Outpatient Prescriptions   Medication Sig Dispense Refill     CRANBERRY PO Take 300 mg by mouth 3 times daily       doxylamine (UNISOM) 25 MG TABS tablet Take 25 mg by mouth At Bedtime       MELATONIN PO Take 10 mg by mouth At Bedtime        loratadine (CLARITIN) 10 MG tablet Take 10 mg by mouth daily       donepezil (ARICEPT) 10 MG tablet Take 1 tablet (10 mg) by mouth At Bedtime 30 tablet 0     losartan (COZAAR) 25 MG tablet Take 1 tablet (25 mg) by mouth daily (Needs follow-up appointment for this medication) 30 tablet 0     hydrochlorothiazide (HYDRODIURIL) 25 MG tablet Take 1 tablet (25 mg) by mouth daily (Needs follow-up appointment for this medication) 30 tablet 0     LORazepam (ATIVAN) 0.5 MG tablet Take 1 tablet (0.5 mg) by mouth daily as needed for anxiety 30 tablet 0     sertraline (ZOLOFT) 50 MG tablet Take 1 tablet (50 mg) by mouth daily (Needs follow-up appointment for this medication) 30 tablet 0     ASPIRIN CAPS 81 MG OR 1 TABLET DAILY         Allergies:  Allergies   Allergen Reactions     Nkda [No Known Drug Allergies]        Problem List:    Patient Active Problem List    Diagnosis Date Noted     Mild dementia 11/11/2015     Priority: Medium     SLUMS score 19-20 fall 2015       Memory changes 01/30/2015     Priority: Medium     Mild major depression (H) 10/17/2013     Priority: Medium     HTN, goal below 150/90 10/17/2013     Priority: Medium     Advanced directives, counseling/discussion 02/16/2012     Priority: Medium     Advance Directive Problem List Overview:   Name Relationship Phone    Primary Health Care Agent            Alternative Health Care Agent          Discussed advance care planning with patient; information given to patient to review. 2/16/2012        [place] (hard of hearing) 07/19/2011     Priority: Medium     Anxiety 07/19/2011     Priority: Medium     CARDIOVASCULAR SCREENING; LDL GOAL LESS THAN 160 10/31/2010     Priority: Medium     Osteopenia 02/09/2010     Priority: Medium     With recent wrist fracture       Varicose vein of leg 02/09/2010     Priority: Medium     Loss of height 02/09/2010     Priority: Medium     Family history of ischemic heart disease 02/09/2010     Priority: Medium     S/P lumpectomy of  breast 02/09/2010     Priority: Medium     left breast upper outer quadrant benign lesion in 20's       SENSONRL HEAR LOSS,BILAT 11/07/2006     Priority: Medium        Past Medical History:    Past Medical History:   Diagnosis Date     Environmental allergies      Menopause age 45     Osteoarthritis      Pneumonia 1960s       Past Surgical History:    Past Surgical History:   Procedure Laterality Date     APPENDECTOMY       CATARACT IOL, RT/LT  4/2010    left     COLONOSCOPY  6/20/2011    Procedure:COLONOSCOPY; Surgeon:KING VENCES; Location:[facility]     SURGICAL HISTORY OF -       LEFT ganglion cyst ex     SURGICAL HISTORY OF -       LEFT breast cyst ex     TONSILLECTOMY         Family History:    Family History   Problem Relation Age of Onset     CANCER Mother      skin     HEART DISEASE Mother      HEART DISEASE Father      Allergies Brother      CANCER Brother      lung       Social History:  Marital Status:   [5]  Social History   Substance Use Topics     Smoking status: Former Smoker     Smokeless tobacco: Never Used      Comment: 1972     Alcohol use Yes      Comment: rarely        Medications:      CRANBERRY PO   doxylamine (UNISOM) 25 MG TABS tablet   MELATONIN PO   loratadine (CLARITIN) 10 MG tablet   donepezil (ARICEPT) 10 MG tablet   losartan (COZAAR) 25 MG tablet   hydrochlorothiazide (HYDRODIURIL) 25 MG tablet   LORazepam (ATIVAN) 0.5 MG tablet   sertraline (ZOLOFT) 50 MG tablet   ASPIRIN CAPS 81 MG OR         Review of Systems   Constitutional:        Failure to thrive at home.  Increasing falls   HENT:        Right cheek contusion   Eyes: Negative.    Respiratory: Negative.    Cardiovascular: Negative.    Gastrointestinal: Negative.    Endocrine: Negative.    Genitourinary: Negative.    Musculoskeletal: Negative.    Skin: Negative.    Allergic/Immunologic: Negative.    Neurological: Positive for weakness.   Hematological: Negative.    Psychiatric/Behavioral: Negative.        Physical  Exam   BP: 126/65  Heart Rate: 81  Temp: 97.8  F (36.6  C)  Resp: 16  SpO2: 94 %      Physical Exam   Constitutional: She is oriented to person, place, and time. She appears well-developed and well-nourished. No distress.   HENT:   Head: Normocephalic. Head is with contusion.       Eyes: Conjunctivae and EOM are normal. Pupils are equal, round, and reactive to light. Right eye exhibits no discharge. Left eye exhibits no discharge. No scleral icterus.   Neck: Normal range of motion. Neck supple. No JVD present. No tracheal deviation present. No thyromegaly present.   Pulmonary/Chest: Effort normal and breath sounds normal. No stridor. No respiratory distress. She has no wheezes. She has no rales. She exhibits no tenderness.   Abdominal: Soft. Bowel sounds are normal. She exhibits no distension and no mass. There is no tenderness. There is no rebound.   Lymphadenopathy:     She has no cervical adenopathy.   Neurological: She is alert and oriented to person, place, and time.   Skin: No rash noted. She is not diaphoretic. No erythema. No pallor.   Psychiatric: She has a normal mood and affect. Her behavior is normal. Judgment and thought content normal.       ED Course     ED Course     Procedures               Critical Care time:  none                 ED Medications: none      ED Vitals:  Vitals:    01/14/18 1515 01/14/18 1553 01/14/18 1554 01/14/18 1600   BP: 119/69 133/60  124/63   BP Location:  Right arm     Resp:  16     Temp:       TempSrc:       SpO2:   92% 94%       ED Labs and Imaging:  Results for orders placed or performed during the hospital encounter of 01/14/18 (from the past 24 hour(s))   CBC with platelets differential   Result Value Ref Range    WBC 8.2 4.0 - 11.0 10e9/L    RBC Count 3.79 (L) 3.8 - 5.2 10e12/L    Hemoglobin 11.7 11.7 - 15.7 g/dL    Hematocrit 35.3 35.0 - 47.0 %    MCV 93 78 - 100 fl    MCH 30.9 26.5 - 33.0 pg    MCHC 33.1 31.5 - 36.5 g/dL    RDW 13.0 10.0 - 15.0 %    Platelet Count 226  150 - 450 10e9/L    Diff Method Automated Method     % Neutrophils 76.6 %    % Lymphocytes 15.4 %    % Monocytes 7.0 %    % Eosinophils 0.7 %    % Basophils 0.2 %    % Immature Granulocytes 0.1 %    Absolute Neutrophil 6.3 1.6 - 8.3 10e9/L    Absolute Lymphocytes 1.3 0.8 - 5.3 10e9/L    Absolute Monocytes 0.6 0.0 - 1.3 10e9/L    Absolute Eosinophils 0.1 0.0 - 0.7 10e9/L    Absolute Basophils 0.0 0.0 - 0.2 10e9/L    Abs Immature Granulocytes 0.0 0 - 0.4 10e9/L   Comprehensive metabolic panel   Result Value Ref Range    Sodium 140 133 - 144 mmol/L    Potassium 3.6 3.4 - 5.3 mmol/L    Chloride 106 94 - 109 mmol/L    Carbon Dioxide 24 20 - 32 mmol/L    Anion Gap 10 3 - 14 mmol/L    Glucose 142 (H) 70 - 99 mg/dL    Urea Nitrogen 17 7 - 30 mg/dL    Creatinine 0.82 0.52 - 1.04 mg/dL    GFR Estimate 66 >60 mL/min/1.7m2    GFR Estimate If Black 80 >60 mL/min/1.7m2    Calcium 8.6 8.5 - 10.1 mg/dL    Bilirubin Total 0.3 0.2 - 1.3 mg/dL    Albumin 3.3 (L) 3.4 - 5.0 g/dL    Protein Total 6.6 (L) 6.8 - 8.8 g/dL    Alkaline Phosphatase 67 40 - 150 U/L    ALT 19 0 - 50 U/L    AST 18 0 - 45 U/L   UA reflex to Microscopic   Result Value Ref Range    Color Urine Yellow     Appearance Urine Clear     Glucose Urine Negative NEG^Negative mg/dL    Bilirubin Urine Negative NEG^Negative    Ketones Urine Negative NEG^Negative mg/dL    Specific Gravity Urine 1.009 1.003 - 1.035    Blood Urine Negative NEG^Negative    pH Urine 5.0 5.0 - 7.0 pH    Protein Albumin Urine Negative NEG^Negative mg/dL    Urobilinogen mg/dL Normal 0.0 - 2.0 mg/dL    Nitrite Urine Negative NEG^Negative    Leukocyte Esterase Urine Negative NEG^Negative    Source Catheterized Urine    Head CT w/o contrast    Narrative    CT SCAN OF THE HEAD WITHOUT CONTRAST   1/14/2018 3:36 PM     HISTORY: Fall, found on stomach. Increasing falls at home.  Dementia.       TECHNIQUE:  Axial images of the head and coronal reformations without  IV contrast material. Radiation dose for this  scan was reduced using  automated exposure control, adjustment of the mA and/or kV according  to patient size, or iterative reconstruction technique.    COMPARISON: Head CT 9/4/2015.    FINDINGS: There is no evidence of intracranial hemorrhage, mass, acute  infarct or anomaly. There is generalized atrophy of the brain. There  is low attenuation in the white matter of the cerebral hemispheres  consistent with sequelae of small vessel ischemic disease.    The visualized portions of the sinuses and mastoids appear normal. The  bony calvarium and bones of the skull base appear intact. Bilateral  pseudophakia.      Impression    IMPRESSION:     1. No evidence of acute intracranial hemorrhage, mass, or herniation.  2. There is generalized atrophy of the brain. White matter changes are  present in the cerebral hemispheres that are consistent with small  vessel ischemic disease in this age patient.      PAYTON PRICE MD   Cervical spine CT w/o contrast    Narrative    CT CERVICAL SPINE WITHOUT CONTRAST   1/14/2018 3:37 PM     HISTORY: Fall, found on stomach.  Evaluate for acute bony process due  to trauma history of dementia increasing frequency of falls, Fall,  found on stomach.  Evaluate for acute bony process due to trauma  history of dementia increasing frequency of falls;      TECHNIQUE: Axial images of the cervical spine were obtained without  intravenous contrast. Multiplanar reformations were performed.   Radiation dose for this scan was reduced using automated exposure  control, adjustment of the mA and/or kV according to patient size, or  iterative reconstruction technique.    COMPARISON: None.    FINDINGS: There is no evidence of fracture. Alignment is normal.      Craniocervical junction: Normal.     C1-C2:  Calcified pannus is seen posterior to the odontoid. This can  be seen in patients with calcium pyrophosphate deposition disease.     C2-C3:  Mild annular disc bulge.     C3-C4:  Annular disc bulge. Central  canal normal.     C4-C5:  Loss of disc space height. Mild bulge. Mild degenerative  change in the facet joints.     C5-C6:  Loss of disc space height. Mild bulge. Degenerative change in  the facet joints, left greater than right.      C6-C7:  Mild annular disc bulge. Central canal normal.      C7-T1:   Normal disc, facet joints, spinal canal and neural foramina.      Impression    IMPRESSION:    1. Negative for fracture.  2. Degenerative change.   Pelvis XR w/ unilateral hip left    Narrative    PELVIS AND HIP LEFT ONE VIEW   1/14/2018 3:55 PM     HISTORY: Found on stomach, fall. Increasing falls. Unstable gait,  evaluate for bony process due to fall.    COMPARISON: None.      Impression    IMPRESSION: No acute fracture or dislocation identified. Anatomic  alignment. Pelvic calcifications could represent fibroids or ovarian  calcifications.             1:33 PM Patient Assessed.     Assessments & Plan (with Medical Decision Making)   Clinical Impression: 85-year-old female with a history of dementia on Aricept who presented for concern for fall.  History is obtained from her daughter who is at the bedside.  Patient is known to not want to use a walker at home.  This concerned that she is having increasing falls at home and not thriving at home.  Daughter tells me she takes lorazepam for sundowning at night.  She is also on losartan and hydrochlorothiazide.  Patient was found on her stomach on a carpeted surface in her bedroom after she was last seen at 4 AM.  This is about 3 hours after she was found on the floor as she lives with her daughter and son who is chemically dependent who goes to methadone clinic.  Patient has walked since she was picked up and helped off the floor but seemed to complain some left leg discomfort.  She has redness about her right cheek but has no pain to palpation about the face on my exam.  She is awake but not talking.  She has no gross bony deformity palpation about the chest.  Her  abdomen is soft nondistended without any bruising.  Her pelvis is stable.  Range of motion of her hips and knees are normal without any grimacing of pain and no limb length discrepancy.  Daughter tells me the patient does not like to ask for help and does not use her walker when asked.    ED Course and Plan:   Daughter is asking that the patient be admitted for placement of memory care in a nursing home.  Imaging was obtained given her age with unwitnessed fall and concern for increasing fall and failure to thrive.  A CT of her head, cervical spine, x-ray of her pelvis and left hip was obtained.  Blood work was obtained as well as a catheterized urine sample.  Her urine is normal.  She has a normal white count and her hemogram is stable.  Family does not report that the patient takes any anticoagulants or blood thinners.  CT of the head, cervical spine and x-ray of the pelvis and left hip did not show any acute process elated to trauma with reported fall.  Please see the interpreting radiologist's report above.    I spoke with Dr Blue-admitting hospitalist at 4.20PM who agreed to assume care and admission. I reviewed rationale for admission, family's request, patient's history and presentation, we also discussed interventions and workup and imaging completed in the emergency department.  Daughter who is at the bedside is notified of workup results and imaging results.      Disclaimer: This note consists of symbols derived from keyboarding, dictation and/or voice recognition software. As a result, there may be errors in the script that have gone undetected. Please consider this when interpreting information found in this chart.      I have reviewed the nursing notes.    I have reviewed the findings, diagnosis, plan and need for follow up with the patient.       New Prescriptions    No medications on file       Final diagnoses:   Fall, initial encounter - Found on the floor in her bedroom on carpet, face down.    History of dementia   Falls frequently   Contusion, cheek, initial encounter - post- unwitnessed fall.     This document serves as a record of the services and decisions personally performed and made by Ad Melendez, *. HPI was created on his behalf by   Hannah Sanchez, a trained medical scribe. The creation of this document is based the provider's statements to the medical scribe.  Hannah Sanchez 1:32 PM 1/14/2018    Provider:   The information in this document, created by the medical scribe for me, accurately reflects the services I personally performed and the decisions made by me. I have reviewed and approved this document for accuracy prior to leaving the patient care area.  Ad Melendez, * 1:32 PM 1/14/2018 1/14/2018   [facility] EMERGENCY DEPARTMENT     Ad Melendez MD  01/14/18 [id number]

## 2018-01-14 NOTE — H&P
[facility]    History and Physical  Hospital Medicine       Date of Admission:  1/14/2018  Date of Service: 1/14/2018     Assessment & Plan   Sherri Bender is a 85 year old female with hx dementia who presents with progressive confusion and increased caretaker needs over past 1-2 months    1. Dementia - progression to the point of family feeling like she needs placement. Some behavior problems gwen at night. Will try risperdaol.Will ask soc service to see, OT/PT. Cont aricept. Will hold ativan, unisom, claritin  as may be making her more confused  2. Fall- presumed mechanical, based on past hx of witnessed fall when attempting to walk unassisted- abrasions to face ( right cheek) and bilat knee- tylenol and ice prn.ck pending  4.HTN- cont cozaar but stop hctz for now  5. Depression- cont zoloft  6.Urinary incontinence- will hold hctz as this could be making it worse  7.Chronic rhinnorhea, hx environmental alllergies- has been on claritin. Will hold for now in case claritin increasing confusion  8.FEN- has had poor po intakeper daughter- will start ensure with meals. Has been on one ensure a day at home        DVT Prophylaxis: low risk, ambulate  Code Status:DNR/DNI per dtr  Disposition: Admit observation    Leticia Blue            Past Medical History      Past Medical History:   Diagnosis Date     Dementia      Depression      Environmental allergies     spring, fall     HTN (hypertension)      Osteoarthritis      Patient Active Problem List    Diagnosis Date Noted     Mild dementia 11/11/2015     Priority: Medium     SLUMS score 19-20 fall 2015       Memory changes 01/30/2015     Priority: Medium     Mild major depression (H) 10/17/2013     Priority: Medium     HTN, goal below 150/90 10/17/2013     Priority: Medium     Advanced directives, counseling/discussion 02/16/2012     Priority: Medium     Advance Directive Problem List Overview:   Name Relationship Phone    Primary  Health Care Agent            Alternative Health Care Agent          Discussed advance care planning with patient; information given to patient to review. 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     Priority: Medium     Loss of height 02/09/2010     Priority: Medium     Family history of ischemic heart disease 02/09/2010     Priority: Medium     S/P lumpectomy of breast 02/09/2010     Priority: Medium     left breast upper outer quadrant benign lesion in 20's       SENSONRL HEAR LOSS,BILAT 11/07/2006     Priority: Medium        Past Surgical History     Past Surgical History:   Procedure Laterality Date     APPENDECTOMY       CATARACT IOL, RT/LT  4/2010    left     COLONOSCOPY  6/20/2011    Procedure:COLONOSCOPY; Surgeon:KING VENCES; Location:[facility]     SURGICAL HISTORY OF -       LEFT ganglion cyst ex     SURGICAL HISTORY OF -       LEFT breast cyst ex     TONSILLECTOMY          History of Present Illness   Sherri Bender is a 85 year old female with hx dementia who is brougth in by her daughter today with concerns that she needs placement. Daughter lives with pt in pt's house.  She and her brother arethe primary caretakers.  Pt has declined over past 1-2 months where she is more confused, more weak, needing more help with activities of daily living, falling more, eating less.  Nighttime is particularly hard as pt more agitated then.  Today pt was found on the floor at 7 am, on her abd with a chair under her head. Daughter had fallen asleep and last seen pt in bed at 4 am. The daughter feels she can no longer safely care for her mother at home.  The pt primary c/o being tired. She denies pain. She doesn't know where she is  now.    Prior to Admission Medications   Prior to Admission Medications   Prescriptions Last Dose Informant Patient Reported? Taking?   ASPIRIN CAPS 81 MG OR More than a month at Unknown time Daughter Yes No   Si TABLET DAILY   CRANBERRY PO 2018 at am Daughter Yes Yes   Sig: Take 300 mg by mouth 3 times daily   LORazepam (ATIVAN) 0.5 MG tablet 2018 at am Daughter No Yes   Sig: Take 1 tablet (0.5 mg) by mouth daily as needed for anxiety   MELATONIN PO 2018 at hs Daughter Yes Yes   Sig: Take 10 mg by mouth At Bedtime   Nutritional Supplements (ENSURE) LIQD  Daughter Yes Yes   Sig: Take by mouth as needed   donepezil (ARICEPT) 10 MG tablet 2018 at hs Daughter No Yes   Sig: Take 1 tablet (10 mg) by mouth At Bedtime   doxylamine (UNISOM) 25 MG TABS tablet 2018 at hs Daughter Yes Yes   Sig: Take 25 mg by mouth At Bedtime   hydrochlorothiazide (HYDRODIURIL) 25 MG tablet 2018 at am Daughter No Yes   Sig: Take 1 tablet (25 mg) by mouth daily (Needs follow-up appointment for this medication)   loratadine (CLARITIN) 10 MG tablet 2018 at am Daughter Yes Yes   Sig: Take 10 mg by mouth daily   losartan (COZAAR) 25 MG tablet 2018 at am Daughter No Yes   Sig: Take 1 tablet (25 mg) by mouth daily (Needs follow-up appointment for this medication)   sertraline (ZOLOFT) 50 MG tablet 2018 at am Daughter No Yes   Sig: Take 1 tablet (50 mg) by mouth daily (Needs follow-up appointment for this medication)      Facility-Administered Medications: None     Allergies   Allergies   Allergen Reactions     Nkda [No Known Drug Allergies]        Family History    Family History   Problem Relation Age of Onset     CANCER Mother      skin     HEART DISEASE Mother      HEART DISEASE Father      Allergies Brother      CANCER Brother      lung       Social History   Social History     Social History     Marital status:      Spouse name: N/A     Number of children: N/A     Years of education: N/A      Occupational History     Not on file.     Social History Main Topics     Smoking status: Former Smoker     Smokeless tobacco: Never Used      Comment: 1972     Alcohol use No     Drug use: No     Sexual activity: No     Other Topics Concern     Parent/Sibling W/ Cabg, Mi Or Angioplasty Before 65f 55m? No     Social History Narrative    Lives with daughter and son       Review of Systems   10 point ros neg except for hpi and chronic runny nose, chronic urinary incontinence,tremulousness    Physical Exam   /78  Temp 97.8  F (36.6  C) (Oral)  Resp 16  SpO2 94%     Weight: 0 lbs 0 oz There is no height or weight on file to calculate BMI.     Constitutional: Alert, doesn't know where she is, intermittently trying to get out of bed  Eyes: Eyes are clear, pupils are reactive.  HEENT: Oropharynx is clear and moist. Abrasion with sl swelling right cheek  Lymph/Hematologic: No cervical lymphadenopathy is appreciated.  Cardiovascular: Regular rate and rhythm, normal S1 and S2, and no murmur noted.  Good peripheral pulses indp bilaterally. No lower extremity edema.  Respiratory: Clear to auscultation bilaterally.   GI: Soft, non-tender, normal bowel sounds, no hepatosplenomegaly.  Genitourinary: Deferred  Musculoskeletal: small abrasions medial aspect bilat knees  Skin: Warm and dry, no rashes.   Neurologic: Neck supple. Cranial nerves are grossly intact.  is symmetric.     Data   Data reviewed today:     Recent Labs  Lab 01/14/18  1343   WBC 8.2   HGB 11.7   MCV 93         POTASSIUM 3.6   CHLORIDE 106   CO2 24   BUN 17   CR 0.82   ANIONGAP 10   ZACK 8.6   *   ALBUMIN 3.3*   PROTTOTAL 6.6*   BILITOTAL 0.3   ALKPHOS 67   ALT 19   AST 18   u/a- neg    Recent Results (from the past 24 hour(s))   Head CT w/o contrast    Narrative    CT SCAN OF THE HEAD WITHOUT CONTRAST   1/14/2018 3:36 PM     HISTORY: Fall, found on stomach. Increasing falls at home.  Dementia.       TECHNIQUE:  Axial images of  the head and coronal reformations without  IV contrast material. Radiation dose for this scan was reduced using  automated exposure control, adjustment of the mA and/or kV according  to patient size, or iterative reconstruction technique.    COMPARISON: Head CT 9/4/2015.    FINDINGS: There is no evidence of intracranial hemorrhage, mass, acute  infarct or anomaly. There is generalized atrophy of the brain. There  is low attenuation in the white matter of the cerebral hemispheres  consistent with sequelae of small vessel ischemic disease.    The visualized portions of the sinuses and mastoids appear normal. The  bony calvarium and bones of the skull base appear intact. Bilateral  pseudophakia.      Impression    IMPRESSION:     1. No evidence of acute intracranial hemorrhage, mass, or herniation.  2. There is generalized atrophy of the brain. White matter changes are  present in the cerebral hemispheres that are consistent with small  vessel ischemic disease in this age patient.      PAYTON PRICE MD   Cervical spine CT w/o contrast    Narrative    CT CERVICAL SPINE WITHOUT CONTRAST   1/14/2018 3:37 PM     HISTORY: Fall, found on stomach.  Evaluate for acute bony process due  to trauma history of dementia increasing frequency of falls, Fall,  found on stomach.  Evaluate for acute bony process due to trauma  history of dementia increasing frequency of falls;      TECHNIQUE: Axial images of the cervical spine were obtained without  intravenous contrast. Multiplanar reformations were performed.   Radiation dose for this scan was reduced using automated exposure  control, adjustment of the mA and/or kV according to patient size, or  iterative reconstruction technique.    COMPARISON: None.    FINDINGS: There is no evidence of fracture. Alignment is normal.      Craniocervical junction: Normal.     C1-C2:  Calcified pannus is seen posterior to the odontoid. This can  be seen in patients with calcium pyrophosphate  deposition disease.     C2-C3:  Mild annular disc bulge.     C3-C4:  Annular disc bulge. Central canal normal.     C4-C5:  Loss of disc space height. Mild bulge. Mild degenerative  change in the facet joints.     C5-C6:  Loss of disc space height. Mild bulge. Degenerative change in  the facet joints, left greater than right.      C6-C7:  Mild annular disc bulge. Central canal normal.      C7-T1:   Normal disc, facet joints, spinal canal and neural foramina.      Impression    IMPRESSION:    1. Negative for fracture.  2. Degenerative change.    NALDO HUFFMAN MD   Pelvis XR w/ unilateral hip left    Narrative    PELVIS AND HIP LEFT ONE VIEW   1/14/2018 3:55 PM     HISTORY: Found on stomach, fall. Increasing falls. Unstable gait,  evaluate for bony process due to fall.    COMPARISON: None.      Impression    IMPRESSION: No acute fracture or dislocation identified. Anatomic  alignment. Pelvic calcifications could represent fibroids or ovarian  calcifications.           Leticia Blue

## 2018-01-14 NOTE — IP AVS SNAPSHOT
"` `     [facility] SURGICAL: [phone]                                              INTERAGENCY TRANSFER FORM - NURSING   2018                    Hospital Admission Date: 2018  DENTON MALLORY   : 1932  Sex: Female        Attending Provider: Bryce Holcomb MD     Allergies:  Nkda [No Known Drug Allergies]    Infection:  None   Service:  INTERNAL MED    Ht:  1.607 m (5' 3.25\")   Wt:  --   Admission Wt:  --    BMI:  --   BSA:  --            Patient PCP Information     Provider PCP Type    Kameron Cool MD General      Current Code Status     Date Active Code Status Order ID Comments User Context       2018  5:53 PM DNR/DNI [id number]  Leticia Blue MD Inpatient       Code Status History     Date Active Date Inactive Code Status Order ID Comments User Context    This patient has a current code status but no historical code status.      Advance Directives        Does patient have a scanned Advance Directive/ACP document in EPIC?                   Hospital Problems as of 2018              Priority Class Noted POA    HTN, goal below 150/90 Medium  10/17/2013 Yes    Dementia Medium  2018 Yes    Agitation Medium  2018 Yes      Non-Hospital Problems as of 2018              Priority Class Noted    SENSONRL HEAR LOSS,BILAT Medium  2006    Osteopenia Medium  2010    Varicose vein of leg Medium  2010    Loss of height Medium  2010    Family history of ischemic heart disease Medium  2010    S/P lumpectomy of breast Medium  2010    CARDIOVASCULAR SCREENING; LDL GOAL LESS THAN 160 Medium  10/31/2010    [place] (hard of hearing) Medium  2011    Anxiety Medium  2011    Advanced directives, counseling/discussion Medium  2012    Mild major depression (H) Medium  10/17/2013    Memory changes Medium  2015    Mild dementia Medium  2015      Immunizations     Name Date      Influenza (High Dose) 3 valent vaccine 10/22/15  " "   Influenza (High Dose) 3 valent vaccine 10/15/15     Influenza (High Dose) 3 valent vaccine 10/17/11     Influenza (IIV3) PF 10/23/14     Influenza (IIV3) PF 10/01/13     Influenza (IIV3) PF 10/26/12     Influenza (IIV3) PF 10/17/11     Influenza (IIV3) PF 11/01/10     Influenza (IIV3) PF 12/09/09     Influenza (IIV3) PF 10/28/08     Influenza (IIV3) PF 11/13/07     Pneumo Conj 13-V (2010&after) 10/27/15     Pneumococcal 23 valent 11/21/09     Pneumococcal 23 valent 12/20/01     TD (ADULT, 7+) 11/20/09     TDAP Vaccine (Adacel) 01/01/90     Zoster vaccine, live 06/16/14          END      ASSESSMENT     Discharge Profile Flowsheet     EXPECTED DISCHARGE     SKIN      Expected Discharge Date  01/17/18 01/17/18 1316   Inspection of bony prominences  Full 01/17/18 0143    GASTROINTESTINAL (ADULT,PEDIATRIC,OB)     Skin WDL  ex 01/17/18 0933    GI WDL  WDL 01/17/18 0927   Skin Temperature  warm 01/17/18 0933    Last Bowel Movement  01/15/18 01/17/18 0933   Skin Integrity  abrasion(s) 01/17/18 0933    COMMUNICATION ASSESSMENT     Inspection under devices  Full 01/17/18 0143    Patient's communication style  spoken language (English or Bilingual) 01/14/18 1326   Skin Elasticity  quick return to original state 01/16/18 1635    FINAL RESOURCES     SAFETY      Resources List  Skilled Nursing Facility 01/17/18 1316   Safety WDL  WDL;ex;safety factors 01/17/18 0933    Skilled Nursing Facility  -- ([facility]) 01/17/18 1316   All Alarms  alarm(s) activated and audible 01/17/18 0933    PAS Number  [id number] 01/17/18 1316                      Assessment WDL (Within Defined Limits) Definitions           Safety WDL     Effective: 09/28/15    Row Information: <b>WDL Definition:</b> Bed in low position, wheels locked; call light in reach; upper side rails up x 2; ID band on<br> <font color=\"gray\"><i>Item=AS safety wdl>>List=AS safety wdl>>Version=F14</i></font>      Skin WDL     Effective: 09/28/15    Row " "Information: <b>WDL Definition:</b> Warm; dry; intact; elastic; without discoloration; pressure points without redness<br> <font color=\"gray\"><i>Item=AS skin wdl>>List=AS skin wdl>>Version=F14</i></font>      Vitals     Vital Signs Flowsheet     VITAL SIGNS     POSITIONING      Temp  97.4  F (36.3  C) 01/17/18 1117   Body Position  independently positioning 01/17/18 0743    Temp src  Oral 01/17/18 1117   Head of Bed (HOB)  HOB flat 01/16/18 2343    Resp  16 01/17/18 1117   Chair  Upright in chair 01/17/18 0743    Pulse  89 01/17/18 1117   Positioning/Transfer Devices  pillows;in use 01/16/18 2343    Heart Rate  94 01/17/18 0723   CLINICALLY ALIGNED PAIN ASSESSMENT (CAPA) ([facility], [place], operated [facility] ADULTS ONLY)      Pulse/Heart Rate Source  Monitor 01/17/18 1117   Comfort  negligible pain 01/17/18 0145    BP  121/60 01/17/18 1117   TERRY COMA SCALE      BP Location  Left arm 01/17/18 1117   Best Eye Response  4-->(E4) spontaneous 01/17/18 0145    OXYGEN THERAPY     Best Motor Response  6-->(M6) obeys commands 01/17/18 0145    SpO2  92 % 01/17/18 1117   Best Verbal Response  5-->(V5) oriented 01/17/18 0145    O2 Device  None (Room air) 01/17/18 1117   Terry Coma Scale Score  15 01/17/18 0145    PAIN/COMFORT     DAILY CARE      Patient Currently in Pain  sleeping: patient not able to self report 01/17/18 0145   Activity Management  activity adjusted per tolerance;ambulated to bathroom;up in chair 01/17/18 0743    HEIGHT AND WEIGHT     Activity Assistance Provided  assistance, 1 person 01/17/18 0743    Height Method  Estimated 01/14/18 1333   Assistive Device Utilized  walker 01/17/18 0743    Estimated Weight (From ED)  83.9 kg (185 lb) 01/14/18 1333                 Patient Lines/Drains/Airways Status    Active LINES/DRAINS/AIRWAYS     Name: Placement date: Placement time: Site: Days: Last dressing change:    Peripheral IV 01/15/18 Right Lower forearm 01/15/18   1530   Lower forearm   1     Wound 01/15/18 Bilateral " "Knee Abrasion(s) \"quarter\" size bilat abrasions, scabbing, intact 01/15/18      Knee   2     Wound 01/15/18 Outer;Right Knee Abrasion(s) 01/15/18   1530   Knee   1             Patient Lines/Drains/Airways Status    Active PICC/CVC     None            Intake/Output Detail Report     Date Intake   Output Net    Shift P.O. IV Piggyback Total Urine Total       Noc 01/15/18 2300 - 01/16/18 0659 -- -- -- -- -- 0    Day 01/16/18 0700 - 01/16/18 1459 360 -- 360 150 150 210    Zoila 01/16/18 1500 - 01/16/18 2259 -- -- -- -- -- 0    Noc 01/16/18 2300 - 01/17/18 0659 570 -- 570 -- -- 570    Day 01/17/18 0700 - 01/17/18 1459 840 -- 840 -- -- 840      Last Void/BM       Most Recent Value    Urine Occurrence 1 at 01/16/2018 2343    Stool Occurrence 1 at 01/15/2018 0245      Case Management/Discharge Planning     Case Management/Discharge Planning Flowsheet     REFERRAL INFORMATION     EXPECTED DISCHARGE      Did the Initial Social Work Assessment result in a Social Work Case?  No 01/15/18 1132   Expected Discharge Date  01/17/18 01/17/18 1316    Reason For Consult  discharge planning 01/15/18 1132   FINAL RESOURCES      Primary Care Clinic Name  [facility] 01/15/18 1132   Resources List  Skilled Nursing Facility 01/17/18 1316    Primary Care MD Name  Dr. Cool 01/15/18 1132   Skilled Nursing Facility  -- ([facility]) 01/17/18 1316    LIVING ENVIRONMENT     PAS Number  [id number] 01/17/18 1316    Lives With  child(andrez), adult 01/15/18 1132   ABUSE RISK SCREEN      Living Arrangements  condominium 01/15/18 1132   QUESTION TO PATIENT:  Has a member of your family or a partner(now or in the past) intimidated, hurt, manipulated, or controlled you in any way?  patient declined to answer or is unable to answer 01/14/18 1329    ASSESSMENT OF FAMILY/SOCIAL SUPPORT     QUESTION TO PATIENT: Do you feel safe going back to the place where you are living?  patient declined to answer or is unable to answer " 01/14/18 1329    Who is your support system?  Children 01/15/18 1132   OBSERVATION: Is there reason to believe there has been maltreatment of a vulnerable adult (ie. Physical/Sexual/Emotional abuse, self neglect, lack of adequate food, shelter, medical care, or financial exploitation)?  no 01/14/18 1329    Description of Support System  Supportive;Involved 01/15/18 1132   (R) MENTAL HEALTH SUICIDE RISK      Support Assessment  Adequate family and caregiver support;Caregiver difficulty providing physical care/safety 01/15/18 1132   Are you depressed or being treated for depression?  No (patint not understanding question) 01/14/18 9913

## 2018-01-14 NOTE — ED NOTES
"Daughter commenting \"when they put her upstairs she will have to have someone with her all the time\" \"its not safe for her to go home I can stay awake 24hrs a day to watch her\"   "

## 2018-01-14 NOTE — ED NOTES
Family now at bedside.  Pt has been increasingly weak.  Pt is becoming more confused, does not call for help to ambulate.  Daughter states that she is with her mother 24/7 but when she falls asleep mother will try and assist herself to bathroom etc.  Pt son found her at 7am, pt fell between 4a-7a.  Daughter and son waited to call 911 d/t uncertainty if she should come to ED.  Pt son assisted pt back into bed, and pt ate breakfast, toileted etc, prior to daughter wanting pt to come to ED.      Daughter states she just doesn't feel pt is safe at home anymore.

## 2018-01-14 NOTE — IP AVS SNAPSHOT
"    [facility]: [phone]                                              INTERAGENCY TRANSFER FORM - LAB / IMAGING / EKG / EMG RESULTS   2018                    Hospital Admission Date: 2018  DENTON MALLORY   : 1932  Sex: Female        Attending Provider: Bryce Holcomb MD     Allergies:  Nkda [No Known Drug Allergies]    Infection:  None   Service:  INTERNAL MED    Ht:  1.607 m (5' 3.25\")   Wt:  --   Admission Wt:  --    BMI:  --   BSA:  --            Patient PCP Information     Provider PCP Type    Kameron Cool MD General         Lab Results - 3 Days      CK total [[id number]]  Resulted: 18 1904, Result status: Final result    Ordering provider: Leticia Blue MD  18 1754 Resulting lab: [facility]    Specimen Information    Type Source Collected On   Blood  18 1343          Components       Value Reference Range Flag Lab   CK Total 180 30 - 225 U/L  59            Comprehensive metabolic panel [[id number]] (Abnormal)  Resulted: 18 1418, Result status: Final result    Ordering provider: Ad Melendez MD  18 1333 Resulting lab: [facility]    Specimen Information    Type Source Collected On   Blood  18 1343          Components       Value Reference Range Flag Lab   Sodium 140 133 - 144 mmol/L  59   Potassium 3.6 3.4 - 5.3 mmol/L  59   Chloride 106 94 - 109 mmol/L  59   Carbon Dioxide 24 20 - 32 mmol/L  59   Anion Gap 10 3 - 14 mmol/L  59   Glucose 142 70 - 99 mg/dL H 59   Urea Nitrogen 17 7 - 30 mg/dL  59   Creatinine 0.82 0.52 - 1.04 mg/dL  59   GFR Estimate 66 >60 mL/min/1.7m2  59   Comment:  Non  GFR Calc   GFR Estimate If Black 80 >60 mL/min/1.7m2  59   Comment:  African American GFR Calc   Calcium 8.6 8.5 - 10.1 mg/dL  59   Bilirubin Total 0.3 0.2 - 1.3 mg/dL  59   Albumin 3.3 3.4 - 5.0 g/dL L 59   Protein Total 6.6 6.8 - 8.8 g/dL L 59   Alkaline Phosphatase 67 " 40 - 150 U/L  59   ALT 19 0 - 50 U/L  59   AST 18 0 - 45 U/L  59            UA reflex to Microscopic [[id number]]  Resulted: 01/14/18 1400, Result status: Final result    Ordering provider: Ad Melendez MD  01/14/18 1333 Resulting lab: [facility]    Specimen Information    Type Source Collected On   Catheterized Urine Urine catheter 01/14/18 1351          Components       Value Reference Range Flag Lab   Color Urine Yellow   59   Appearance Urine Clear   59   Glucose Urine Negative NEG^Negative mg/dL  59   Bilirubin Urine Negative NEG^Negative  59   Ketones Urine Negative NEG^Negative mg/dL  59   Specific Gravity Urine 1.009 1.003 - 1.035  59   Blood Urine Negative NEG^Negative  59   pH Urine 5.0 5.0 - 7.0 pH  59   Protein Albumin Urine Negative NEG^Negative mg/dL  59   Urobilinogen mg/dL Normal 0.0 - 2.0 mg/dL  59   Nitrite Urine Negative NEG^Negative  59   Leukocyte Esterase Urine Negative NEG^Negative  59   Source Catheterized Urine   59            CBC with platelets differential [[id number]] (Abnormal)  Resulted: 01/14/18 1359, Result status: Final result    Ordering provider: Ad Melendez MD  01/14/18 1333 Resulting lab: [facility]    Specimen Information    Type Source Collected On   Blood  01/14/18 1343          Components       Value Reference Range Flag Lab   WBC 8.2 4.0 - 11.0 10e9/L  59   RBC Count 3.79 3.8 - 5.2 10e12/L L 59   Hemoglobin 11.7 11.7 - 15.7 g/dL  59   Hematocrit 35.3 35.0 - 47.0 %  59   MCV 93 78 - 100 fl  59   MCH 30.9 26.5 - 33.0 pg  59   MCHC 33.1 31.5 - 36.5 g/dL  59   RDW 13.0 10.0 - 15.0 %  59   Platelet Count 226 150 - 450 10e9/L  59   Diff Method Automated Method   59   % Neutrophils 76.6 %  59   % Lymphocytes 15.4 %  59   % Monocytes 7.0 %  59   % Eosinophils 0.7 %  59   % Basophils 0.2 %  59   % Immature Granulocytes 0.1 %  59   Absolute Neutrophil 6.3 1.6 - 8.3 10e9/L  59   Absolute Lymphocytes 1.3 0.8 - 5.3 10e9/L  59    Absolute Monocytes 0.6 0.0 - 1.3 10e9/L  59   Absolute Eosinophils 0.1 0.0 - 0.7 10e9/L  59   Absolute Basophils 0.0 0.0 - 0.2 10e9/L  59   Abs Immature Granulocytes 0.0 0 - 0.4 10e9/L  59            Testing Performed By     Lab - Abbreviation Name Director Address Valid Date Range    59 - Unknown [facility] Unknown [address] [facility] [address] 12/31/14 1006 - Present            Unresulted Labs     None         Imaging Results - 3 Days      Pelvis XR w/ unilateral hip left [[id number]]  Resulted: 01/16/18 1052, Result status: Final result    Ordering provider: Ad Melendez MD  01/14/18 1407 Resulted by: Kat Kim MD    Performed: 01/14/18 1534 - 01/14/18 1555 Resulting lab: RADIOLOGY RESULTS    Narrative:       PELVIS AND HIP LEFT ONE VIEW   1/14/2018 3:55 PM     HISTORY: Found on stomach, fall. Increasing falls. Unstable gait,  evaluate for bony process due to fall.    COMPARISON: None.      Impression:       IMPRESSION: No acute fracture or dislocation identified. Anatomic  alignment. Pelvic calcifications could represent fibroids or ovarian  calcifications.    KAT KIM MD      Cervical spine CT w/o contrast [[id number]]  Resulted: 01/14/18 1657, Result status: Final result    Ordering provider: Ad Melendez MD  01/14/18 1407 Resulted by: Tutu Gonzales MD    Performed: 01/14/18 1520 - 01/14/18 1537 Resulting lab: RADIOLOGY RESULTS    Narrative:       CT CERVICAL SPINE WITHOUT CONTRAST   1/14/2018 3:37 PM     HISTORY: Fall, found on stomach.  Evaluate for acute bony process due  to trauma history of dementia increasing frequency of falls, Fall,  found on stomach.  Evaluate for acute bony process due to trauma  history of dementia increasing frequency of falls;      TECHNIQUE: Axial images of the cervical spine were obtained without  intravenous contrast. Multiplanar reformations were performed.   Radiation dose for this scan was reduced using  automated exposure  control, adjustment of the mA and/or kV according to patient size, or  iterative reconstruction technique.    COMPARISON: None.    FINDINGS: There is no evidence of fracture. Alignment is normal.      Craniocervical junction: Normal.     C1-C2:  Calcified pannus is seen posterior to the odontoid. This can  be seen in patients with calcium pyrophosphate deposition disease.     C2-C3:  Mild annular disc bulge.     C3-C4:  Annular disc bulge. Central canal normal.     C4-C5:  Loss of disc space height. Mild bulge. Mild degenerative  change in the facet joints.     C5-C6:  Loss of disc space height. Mild bulge. Degenerative change in  the facet joints, left greater than right.      C6-C7:  Mild annular disc bulge. Central canal normal.      C7-T1:   Normal disc, facet joints, spinal canal and neural foramina.      Impression:       IMPRESSION:    1. Negative for fracture.  2. Degenerative change.    NALDO HUFFMAN MD      Head CT w/o contrast [[id number]]  Resulted: 01/14/18 1601, Result status: Final result    Ordering provider: Ad Melendez MD  01/14/18 1407 Resulted by: Dallas Sanchez MD    Performed: 01/14/18 1520 - 01/14/18 1536 Resulting lab: RADIOLOGY RESULTS    Narrative:       CT SCAN OF THE HEAD WITHOUT CONTRAST   1/14/2018 3:36 PM     HISTORY: Fall, found on stomach. Increasing falls at home.  Dementia.       TECHNIQUE:  Axial images of the head and coronal reformations without  IV contrast material. Radiation dose for this scan was reduced using  automated exposure control, adjustment of the mA and/or kV according  to patient size, or iterative reconstruction technique.    COMPARISON: Head CT 9/4/2015.    FINDINGS: There is no evidence of intracranial hemorrhage, mass, acute  infarct or anomaly. There is generalized atrophy of the brain. There  is low attenuation in the white matter of the cerebral hemispheres  consistent with sequelae of small vessel ischemic disease.    The  visualized portions of the sinuses and mastoids appear normal. The  bony calvarium and bones of the skull base appear intact. Bilateral  pseudophakia.      Impression:       IMPRESSION:     1. No evidence of acute intracranial hemorrhage, mass, or herniation.  2. There is generalized atrophy of the brain. White matter changes are  present in the cerebral hemispheres that are consistent with small  vessel ischemic disease in this age patient.      PAYTON PRICE MD      Testing Performed By     Lab - Abbreviation Name Director Address Valid Date Range    104 - Rad [facility] RADIOLOGY RESULTS Unknown Unknown 02/16/05 1553 - Present            Encounter-Level Documents:     There are no encounter-level documents.      Order-Level Documents:     There are no order-level documents.

## 2018-01-14 NOTE — IP AVS SNAPSHOT
` `     [facility] SURGICAL: [phone]            Medication Administration Report for Sherri Bender as of 01/17/18 1410   Legend:    Given Hold Not Given Due Canceled Entry Other Actions    Time Time (Time) Time  Time-Action       Inactive    Active    Linked        Medications 01/11/18 01/12/18 01/13/18 01/14/18 01/15/18 01/16/18 01/17/18    acetaminophen (TYLENOL) Suppository 650 mg  Dose: 650 mg Freq: EVERY 4 HOURS PRN Route: RE  PRN Reason: mild pain  Start: 01/14/18 1751   Admin Instructions: Alternate ibuprofen (if ordered) with acetaminophen.  Maximum acetaminophen dose from all sources = 75 mg/kg/day not to exceed 4 grams/day.               acetaminophen (TYLENOL) tablet 650 mg  Dose: 650 mg Freq: EVERY 4 HOURS PRN Route: PO  PRN Reason: mild pain  Start: 01/14/18 1753   Admin Instructions: Alternate ibuprofen (if ordered) with acetaminophen.  Maximum acetaminophen dose from all sources = 75 mg/kg/day not to exceed 4 grams/day.          1347 (650 mg)-Given        0753 (650 mg)-Given           donepezil (ARICEPT) tablet 10 mg  Dose: 10 mg Freq: AT BEDTIME Route: PO  Start: 01/14/18 2200       2137 (10 mg)-Given        2111 (10 mg)-Given        2128 (10 mg)-Given        [ ] 2200           ENSURE LIQD 1 Can  Dose: 1 Can Freq: 3 TIMES DAILY Route: PO  Start: 01/14/18 2000       (2030)-Not Given        0828 (1 Can)-Given       (1327)-Not Given       2111 (1 Can)-Given        0802 (1 Can)-Given       1347 (1 Can)-Given       2128 (1 Can)-Given        0758 (1 Can)-Given       1336 (1 Can)-Given       [ ] 2000           haloperidol lactate (HALDOL) injection 2-4 mg  Dose: 2-4 mg Freq: EVERY 4 HOURS PRN Route: IV  PRN Reason: agitation  Start: 01/15/18 1743   Admin Instructions: For ordered doses up to 5 mg, give IV Push undiluted over 1 minute.               hydrochlorothiazide (HYDRODIURIL) tablet 25 mg  Dose: 25 mg Freq: DAILY Route: PO  Start: 01/16/18 0915         0919 (25  mg)-Given        0752 (25 mg)-Given           LORazepam (ATIVAN) tablet 0.5 mg  Dose: 0.5 mg Freq: EVERY 6 HOURS PRN Route: PO  PRN Reason: anxiety  Start: 01/16/18 0920         1348 (0.5 mg)-Given            losartan (COZAAR) tablet 25 mg  Dose: 25 mg Freq: DAILY Route: PO  Start: 01/15/18 0800        0826 (25 mg)-Given        0802 (25 mg)-Given        0752 (25 mg)-Given           melatonin tablet 10 mg  Dose: 10 mg Freq: AT BEDTIME Route: PO  Start: 01/16/18 2200         2128 (10 mg)-Given        [ ] 2200           naloxone (NARCAN) injection 0.1-0.4 mg  Dose: 0.1-0.4 mg Freq: EVERY 2 MIN PRN Route: IV  PRN Reason: opioid reversal  Start: 01/14/18 1753   Admin Instructions: For respiratory rate LESS than or EQUAL to 8.  Partial reversal dose:  0.1 mg titrated q 2 minutes for Analgesia Side Effects Monitoring Sedation Level of 3 (frequently drowsy, arousable, drifts to sleep during conversation).Full reversal dose:  0.4 mg bolus for Analgesia Side Effects Monitoring Sedation Level of 4 (somnolent, minimal or no response to stimulation).  For ordered doses up to 2mg give IVP. Give each 0.4mg over 15 seconds in emergency situations. For non-emergent situations further dilute in 9mL of NS to facilitate titration of response.               ondansetron (ZOFRAN-ODT) ODT tab 4 mg  Dose: 4 mg Freq: EVERY 6 HOURS PRN Route: PO  PRN Reasons: nausea,vomiting  Start: 01/14/18 1751   Admin Instructions: This is Step 1 of nausea and vomiting management.  If nausea not resolved in 15 minutes, go to Step 2 prochlorperazine (COMPAZINE). Do not push through foil backing. Peel back foil and gently remove. Place on tongue immediately. Administration with liquid unnecessary  With dry hands, peel back foil backing and gently remove tablet; do not push oral disintegrating tablet through foil backing; administer immediately on tongue and oral disintegrating tablet dissolves in seconds; then swallow with saliva; liquid not required.               Or  ondansetron (ZOFRAN) injection 4 mg  Dose: 4 mg Freq: EVERY 6 HOURS PRN Route: IV  PRN Reasons: nausea,vomiting  Start: 01/14/18 1751   Admin Instructions: This is Step 1 of nausea and vomiting management.  If nausea not resolved in 15 minutes, go to Step 2 prochlorperazine (COMPAZINE).  Irritant. For ordered doses up to 4 mg, give IV Push undiluted over 2-5 minutes.               QUEtiapine (SEROquel) tablet 50 mg  Dose: 50 mg Freq: AT BEDTIME Route: PO  Start: 01/16/18 2200         2128 (50 mg)-Given        [ ] 2200           sertraline (ZOLOFT) tablet 50 mg  Dose: 50 mg Freq: DAILY Route: PO  Start: 01/15/18 0800        0826 (50 mg)-Given        0802 (50 mg)-Given        0752 (50 mg)-Given           sodium chloride (PF) 0.9% PF flush 3 mL  Dose: 3 mL Freq: EVERY 8 HOURS Route: IK  Start: 01/16/18 0800         0803 (3 mL)-Given       (1655)-Not Given       2136 (3 mL)-Given               0753 (3 mL)-Given       [ ] 1600          Future Medications  Medications 01/11/18 01/12/18 01/13/18 01/14/18 01/15/18 01/16/18 01/17/18       QUEtiapine (SEROquel) tablet 25 mg  Dose: 25 mg Freq: DAILY WITH BREAKFAST Route: PO  Start: 01/18/18 0800             Completed Medications  Medications 01/11/18 01/12/18 01/13/18 01/14/18 01/15/18 01/16/18 01/17/18         Dose: 2 mg Freq: ONCE Route: IV  Start: 01/15/18 1745   End: 01/15/18 1756   Admin Instructions: May repeat x1 if not effective.  For ordered doses up to 5 mg, give IV Push undiluted over 1 minute.         1755 (2 mg)-Given               Dose: 12.5 mg Freq: ONCE Route: PO  Start: 01/17/18 0945   End: 01/17/18 1012          1012 (12.5 mg)-Given             Dose: 25 mg Freq: ONCE Route: PO  Start: 01/15/18 1515   End: 01/15/18 1504        1504 (25 mg)-Given            Discontinued Medications  Medications 01/11/18 01/12/18 01/13/18 01/14/18 01/15/18 01/16/18 01/17/18         Dose: 650 mg Freq: EVERY 4 HOURS PRN Route: PO  PRN Reason: mild pain  Start: 01/14/18  1751   End: 01/14/18 1757   Admin Instructions: Alternate ibuprofen (if ordered) with acetaminophen.  Maximum acetaminophen dose from all sources = 75 mg/kg/day not to exceed 4 grams/day.        1757-Med Discontinued            Dose: 2-4 mg Freq: EVERY 6 HOURS PRN Route: IV  PRN Reason: agitation  Start: 01/15/18 1740   End: 01/15/18 1743   Admin Instructions: For ordered doses up to 5 mg, give IV Push undiluted over 1 minute.         1743-Med Discontinued           Dose: 0.1-0.4 mg Freq: EVERY 2 MIN PRN Route: IV  PRN Reason: opioid reversal  Start: 01/14/18 1751   End: 01/14/18 1756   Admin Instructions: For respiratory rate LESS than or EQUAL to 8.  Partial reversal dose:  0.1 mg titrated q 2 minutes for Analgesia Side Effects Monitoring Sedation Level of 3 (frequently drowsy, arousable, drifts to sleep during conversation).Full reversal dose:  0.4 mg bolus for Analgesia Side Effects Monitoring Sedation Level of 4 (somnolent, minimal or no response to stimulation).  For ordered doses up to 2mg give IVP. Give each 0.4mg over 15 seconds in emergency situations. For non-emergent situations further dilute in 9mL of NS to facilitate titration of response.        1756-Med Discontinued            Dose: 4 mg Freq: EVERY 6 HOURS PRN Route: PO  PRN Reasons: nausea,vomiting  Start: 01/14/18 1753   End: 01/14/18 1759   Admin Instructions: This is Step 1 of nausea and vomiting management.  If nausea not resolved in 15 minutes, go to Step 2 prochlorperazine (COMPAZINE). Do not push through foil backing. Peel back foil and gently remove. Place on tongue immediately. Administration with liquid unnecessary  With dry hands, peel back foil backing and gently remove tablet; do not push oral disintegrating tablet through foil backing; administer immediately on tongue and oral disintegrating tablet dissolves in seconds; then swallow with saliva; liquid not required.        1759-Med Discontinued         Or    Dose: 4 mg Freq: EVERY 6  HOURS PRN Route: IV  PRN Reasons: nausea,vomiting  Start: 01/14/18 1753   End: 01/14/18 1759   Admin Instructions: This is Step 1 of nausea and vomiting management.  If nausea not resolved in 15 minutes, go to Step 2 prochlorperazine (COMPAZINE).  Irritant. For ordered doses up to 4 mg, give IV Push undiluted over 2-5 minutes.        1759-Med Discontinued            Dose: 12.5 mg Freq: DAILY WITH BREAKFAST Route: PO  Start: 01/16/18 0930   End: 01/17/18 0931         0945 (12.5 mg)-Given        0752 (12.5 mg)-Given       0931-Med Discontinued         Dose: 50 mg Freq: 2 TIMES DAILY Route: PO  Start: 01/16/18 0930   End: 01/16/18 0923         0923-Med Discontinued          Dose: 0.25 mg Freq: AT BEDTIME Route: PO  Start: 01/14/18 2200   End: 01/16/18 0922       2137 (0.25 mg)-Given        2111 (0.25 mg)-Given        0922-Med Discontinued     Medications 01/11/18 01/12/18 01/13/18 01/14/18 01/15/18 01/16/18 01/17/18

## 2018-01-14 NOTE — LETTER
Transition Communication Hand-off for Care Transitions to Next Level of Care Provider    Name: Sherri Bender  MRN #: [id number]  Primary Care Provider: Kameron Cool  Primary Care MD Name: Dr. Cool  Primary Clinic: [address] [facility] [address]  Primary Care Clinic Name: [facility]  Reason for Hospitalization:  Falls frequently [R29.6]  Fall, initial encounter [W19.XXXA]  Contusion, cheek, initial encounter [S00.83XA]  History of dementia [Z86.59]  Admit Date/Time: 1/14/2018  1:25 PM  Discharge Date: 1/17/18  Payor Source: Payor: MEDICA / Plan: MEDICA PRIME SOLUTION / Product Type: Indemnity /     Readmission Assessment Measure ([place]) Risk Score/category: Average        Reason for Communication Hand-off Referral:  Patient is going to a LT [facility].    Discharge Plan:  [facility] (Phone: [phone] Fax: [phone]).       Concern for non-adherence with plan of care:   Y/N No  Discharge Needs Assessment:  Needs       Most Recent Value    Skilled Nursing Facility -- [[facility]]    PAS Number [id number]            Berna Martino RN, Care Coordinator    AVS/Discharge Summary is the source of truth; this is a helpful guide for improved communication of patient story

## 2018-01-14 NOTE — ED NOTES
Pt lives at home with family.  Per EMS, pt not on any blood thinners.  Pt declines pain upon exam.  Per EMS, pt has been failure to thrive recently, pt does not want to eat or drink.  PTA, pt was sleeping in ambulance.  Pt family is on way to ED.

## 2018-01-14 NOTE — IP AVS SNAPSHOT
` `     [facility] SURGICAL: [phone]                 INTERAGENCY TRANSFER FORM - NOTES (H&P, Discharge Summary, Consults, Procedures, Therapies)   2018                    Hospital Admission Date: 2018  DENTON BENDER   : 1932  Sex: Female        Patient PCP Information     Provider PCP Type    Kameron Cool MD General         History & Physicals      H&P by Leticia Blue MD at 2018  5:26 PM     Author:  Leticia Blue MD Service:  Hospitalist Author Type:  Physician    Filed:  2018  5:54 PM Date of Service:  2018  5:26 PM Creation Time:  2018  5:26 PM    Status:  Addendum :  Leticia Blue MD (Physician)         [facility]    History and Physical  Hospital Medicine       Date of Admission:  2018  Date of Service: 2018     Assessment & Plan   Denton Bender is a 85 year old female with hx dementia who presents with progressive confusion and increased caretaker needs over past 1-2 months    1. Dementia - progression to the point of family feeling like she needs placement. Some behavior problems gwen at night. Will try risperdaol.Will ask soc service to see, OT/PT. Cont aricept. Will hold ativan, unisom, claritin  as may be making her more confused  2. Fall- presumed mechanical, based on past hx of witnessed fall when attempting to walk unassisted- abrasions to face ( right cheek) and bilat knee- tylenol and ice prn[JM1.1].ck pending[JM1.2]  4.HTN- cont cozaar but stop hctz for now  5. Depression- cont zoloft  6.Urinary incontinence- will hold hctz as this could be making it worse  7.Chronic rhinnorhea, hx environmental alllergies- has been on claritin. Will hold for now in case claritin increasing confusion  8.FEN- has had poor po intakeper daughter- will start ensure with meals. Has been on one ensure a day at home        DVT Prophylaxis: low risk, ambulate  Code Status:DNR/DNI per dtr  Disposition:  Admit observation    Leticia Blue            Past Medical History      Past Medical History:   Diagnosis Date     Dementia      Depression      Environmental allergies     spring, fall     HTN (hypertension)      Osteoarthritis      Patient Active Problem List    Diagnosis Date Noted     Mild dementia 11/11/2015     Priority: Medium     SLUMS score 19-20 fall 2015       Memory changes 01/30/2015     Priority: Medium     Mild major depression (H) 10/17/2013     Priority: Medium     HTN, goal below 150/90 10/17/2013     Priority: Medium     Advanced directives, counseling/discussion 02/16/2012     Priority: Medium     Advance Directive Problem List Overview:   Name Relationship Phone    Primary Health Care Agent            Alternative Health Care Agent          Discussed advance care planning with patient; information given to patient to review. 2/16/2012        [place] (hard of hearing) 07/19/2011     Priority: Medium     Anxiety 07/19/2011     Priority: Medium     CARDIOVASCULAR SCREENING; LDL GOAL LESS THAN 160 10/31/2010     Priority: Medium     Osteopenia 02/09/2010     Priority: Medium     With recent wrist fracture       Varicose vein of leg 02/09/2010     Priority: Medium     Loss of height 02/09/2010     Priority: Medium     Family history of ischemic heart disease 02/09/2010     Priority: Medium     S/P lumpectomy of breast 02/09/2010     Priority: Medium     left breast upper outer quadrant benign lesion in 20's       SENSONRL HEAR LOSS,BILAT 11/07/2006     Priority: Medium        Past Surgical History     Past Surgical History:   Procedure Laterality Date     APPENDECTOMY       CATARACT IOL, RT/LT  4/2010    left     COLONOSCOPY  6/20/2011    Procedure:COLONOSCOPY; Surgeon:KING VENCES; Location:[facility]     SURGICAL HISTORY OF -       LEFT ganglion cyst ex     SURGICAL HISTORY OF -       LEFT breast cyst ex     TONSILLECTOMY          History of Present Illness   Sherri Bender is a 85 year old  female with hx dementia who is brougth in by her daughter today with concerns that she needs placement. Daughter lives with pt in pt's house.  She and her brother arethe primary caretakers.  Pt has declined over past 1-2 months where she is more confused, more weak, needing more help with activities of daily living, falling more, eating less.  Nighttime is particularly hard as pt more agitated then.  Today pt was found on the floor at 7 am, on her abd with a chair under her head. Daughter had fallen asleep and last seen pt in bed at 4 am. The daughter feels she can no longer safely care for her mother at home.  The pt primary c/o being tired. She denies pain. She doesn't know where she is now.    Prior to Admission Medications   Prior to Admission Medications   Prescriptions Last Dose Informant Patient Reported? Taking?   ASPIRIN CAPS 81 MG OR More than a month at Unknown time Daughter Yes No   Si TABLET DAILY   CRANBERRY PO 2018 at am Daughter Yes Yes   Sig: Take 300 mg by mouth 3 times daily   LORazepam (ATIVAN) 0.5 MG tablet 2018 at am Daughter No Yes   Sig: Take 1 tablet (0.5 mg) by mouth daily as needed for anxiety   MELATONIN PO 2018 at hs Daughter Yes Yes   Sig: Take 10 mg by mouth At Bedtime   Nutritional Supplements (ENSURE) LIQD  Daughter Yes Yes   Sig: Take by mouth as needed   donepezil (ARICEPT) 10 MG tablet 2018 at hs Daughter No Yes   Sig: Take 1 tablet (10 mg) by mouth At Bedtime   doxylamine (UNISOM) 25 MG TABS tablet 2018 at hs Daughter Yes Yes   Sig: Take 25 mg by mouth At Bedtime   hydrochlorothiazide (HYDRODIURIL) 25 MG tablet 2018 at am Daughter No Yes   Sig: Take 1 tablet (25 mg) by mouth daily (Needs follow-up appointment for this medication)   loratadine (CLARITIN) 10 MG tablet 2018 at am Daughter Yes Yes   Sig: Take 10 mg by mouth daily   losartan (COZAAR) 25 MG tablet 2018 at am Daughter No Yes   Sig: Take 1 tablet (25 mg) by mouth daily (Needs  follow-up appointment for this medication)   sertraline (ZOLOFT) 50 MG tablet 1/14/2018 at am Daughter No Yes   Sig: Take 1 tablet (50 mg) by mouth daily (Needs follow-up appointment for this medication)      Facility-Administered Medications: None     Allergies   Allergies   Allergen Reactions     Nkda [No Known Drug Allergies]        Family History    Family History   Problem Relation Age of Onset     CANCER Mother      skin     HEART DISEASE Mother      HEART DISEASE Father      Allergies Brother      CANCER Brother      lung       Social History   Social History     Social History     Marital status:      Spouse name: N/A     Number of children: N/A     Years of education: N/A     Occupational History     Not on file.     Social History Main Topics     Smoking status: Former Smoker     Smokeless tobacco: Never Used      Comment: 1972     Alcohol use No     Drug use: No     Sexual activity: No     Other Topics Concern     Parent/Sibling W/ Cabg, Mi Or Angioplasty Before 65f 55m? No     Social History Narrative    Lives with daughter and son       Review of Systems   10 point ros neg except for hpi and chronic runny nose, chronic urinary incontinence,tremulousness    Physical Exam   /78  Temp 97.8  F (36.6  C) (Oral)  Resp 16  SpO2 94%     Weight: 0 lbs 0 oz There is no height or weight on file to calculate BMI.     Constitutional: Alert, doesn't know where she is, intermittently trying to get out of bed  Eyes: Eyes are clear, pupils are reactive.  HEENT: Oropharynx is clear and moist. Abrasion with sl swelling right cheek  Lymph/Hematologic: No cervical lymphadenopathy is appreciated.  Cardiovascular: Regular rate and rhythm, normal S1 and S2, and no murmur noted.  Good peripheral pulses indp bilaterally. No lower extremity edema.  Respiratory: Clear to auscultation bilaterally.   GI: Soft, non-tender, normal bowel sounds, no hepatosplenomegaly.  Genitourinary: Deferred  Musculoskeletal: small  abrasions medial aspect bilat knees  Skin: Warm and dry, no rashes.   Neurologic: Neck supple. Cranial nerves are grossly intact.  is symmetric.     Data   Data reviewed today:     Recent Labs  Lab 01/14/18  1343   WBC 8.2   HGB 11.7   MCV 93         POTASSIUM 3.6   CHLORIDE 106   CO2 24   BUN 17   CR 0.82   ANIONGAP 10   ZACK 8.6   *   ALBUMIN 3.3*   PROTTOTAL 6.6*   BILITOTAL 0.3   ALKPHOS 67   ALT 19   AST 18   u/a- neg    Recent Results (from the past 24 hour(s))   Head CT w/o contrast    Narrative    CT SCAN OF THE HEAD WITHOUT CONTRAST   1/14/2018 3:36 PM     HISTORY: Fall, found on stomach. Increasing falls at home.  Dementia.       TECHNIQUE:  Axial images of the head and coronal reformations without  IV contrast material. Radiation dose for this scan was reduced using  automated exposure control, adjustment of the mA and/or kV according  to patient size, or iterative reconstruction technique.    COMPARISON: Head CT 9/4/2015.    FINDINGS: There is no evidence of intracranial hemorrhage, mass, acute  infarct or anomaly. There is generalized atrophy of the brain. There  is low attenuation in the white matter of the cerebral hemispheres  consistent with sequelae of small vessel ischemic disease.    The visualized portions of the sinuses and mastoids appear normal. The  bony calvarium and bones of the skull base appear intact. Bilateral  pseudophakia.      Impression    IMPRESSION:     1. No evidence of acute intracranial hemorrhage, mass, or herniation.  2. There is generalized atrophy of the brain. White matter changes are  present in the cerebral hemispheres that are consistent with small  vessel ischemic disease in this age patient.      PAYTON PRICE MD   Cervical spine CT w/o contrast    Narrative    CT CERVICAL SPINE WITHOUT CONTRAST   1/14/2018 3:37 PM     HISTORY: Fall, found on stomach.  Evaluate for acute bony process due  to trauma history of dementia increasing frequency of  falls, Fall,  found on stomach.  Evaluate for acute bony process due to trauma  history of dementia increasing frequency of falls;      TECHNIQUE: Axial images of the cervical spine were obtained without  intravenous contrast. Multiplanar reformations were performed.   Radiation dose for this scan was reduced using automated exposure  control, adjustment of the mA and/or kV according to patient size, or  iterative reconstruction technique.    COMPARISON: None.    FINDINGS: There is no evidence of fracture. Alignment is normal.      Craniocervical junction: Normal.     C1-C2:  Calcified pannus is seen posterior to the odontoid. This can  be seen in patients with calcium pyrophosphate deposition disease.     C2-C3:  Mild annular disc bulge.     C3-C4:  Annular disc bulge. Central canal normal.     C4-C5:  Loss of disc space height. Mild bulge. Mild degenerative  change in the facet joints.     C5-C6:  Loss of disc space height. Mild bulge. Degenerative change in  the facet joints, left greater than right.      C6-C7:  Mild annular disc bulge. Central canal normal.      C7-T1:   Normal disc, facet joints, spinal canal and neural foramina.      Impression    IMPRESSION:    1. Negative for fracture.  2. Degenerative change.    NALDO HUFFMAN MD   Pelvis XR w/ unilateral hip left    Narrative    PELVIS AND HIP LEFT ONE VIEW   1/14/2018 3:55 PM     HISTORY: Found on stomach, fall. Increasing falls. Unstable gait,  evaluate for bony process due to fall.    COMPARISON: None.      Impression    IMPRESSION: No acute fracture or dislocation identified. Anatomic  alignment. Pelvic calcifications could represent fibroids or ovarian  calcifications.           Leticia Blue[JM1.1]        Revision History        User Key Date/Time User Provider Type Action    > JM1.2 1/14/2018  5:54 PM Leticia Blue MD Physician Addend     JM1.1 1/14/2018  5:45 PM Leticia Blue MD Physician Sign                     Discharge Summaries      Discharge  Summaries signed by Bryce Holcomb MD at 1/17/2018 12:48 PM      Author:  Bryce Holcomb MD Service:  Hospitalist Author Type:  Physician    Filed:  1/17/2018 12:48 PM Date of Service:  1/15/2018 10:35 AM Creation Time:  1/15/2018 12:13 PM    Status:  Addendum :  Bryce Holcomb MD (Physician)         Dc cancelled-see[JE1.1] 01/17/18[JE1.2] dc note.[JE1.1]     Revision History        User Key Date/Time User Provider Type Action    > JE1.2 1/17/2018 12:48 PM Bryce Holcomb MD Physician Addend     JE1.1 1/17/2018 12:47 PM Bryce Holcomb MD Physician      [N/A] 1/15/2018  1:06 PM Bryce Holcomb MD Physician Sign     [N/A] 1/15/2018 12:13 PM Bryce Holcomb MD Physician Edit            Discharge Summaries signed by Bryce Holcomb MD at 1/15/2018  3:31 PM      Author:  Bryce Holcomb MD Service:  Hospitalist Author Type:  Physician    Filed:  1/15/2018  3:31 PM Date of Service:  1/15/2018 10:32 AM Creation Time:  1/15/2018 12:22 PM    Status:  Addendum :  Bryce Holcomb MD (Physician)         Error  See other note[JE1.1]     Revision History        User Key Date/Time User Provider Type Action    > JE1.1 1/15/2018  3:31 PM Bryce Holcomb MD Physician Addend     [N/A] 1/15/2018  1:06 PM Bryce Holcomb MD Physician Sign     [N/A] 1/15/2018 12:22 PM Bryce Holcomb MD Physician Edit                     Consult Notes      Consults by Berna Martino RN at 1/15/2018 11:46 AM     Author:  Berna Martino RN Service:  (none) Author Type:      Filed:  1/15/2018  4:04 PM Date of Service:  1/15/2018 11:46 AM Creation Time:  1/15/2018 11:32 AM    Status:  Signed :  Berna Martino, RN ()     Consult Orders:    1. Social Work IP Consult [[id number]] ordered by Leticia Blue MD at 01/14/18 1876                Care Transition Initial Assessment - RN  Reason For Consult: discharge planning   Spoke with  daughterBear per phone.    DATA   Active Problems:    HTN, goal below 150/90    Dementia       Primary Care Clinic Name: [facility]  Primary Care MD Name: Dr. Cool  Contact information and PCP information verified: Yes    ASSESSMENT  Cognitive Status: awake, alert and disoriented.       Resources List: Skilled Nursing Facility     Lives With: child(andrez), adult  Living Arrangements: condominium     Description of Support System: Supportive, Involved   Who is your support system?: Children   Support Assessment: Adequate family and caregiver support, Caregiver difficulty providing physical care/safety   Insurance Concerns: No Insurance issues identified      This writer spoke with daughterBear per phone introduced self and role.  Discussed discharge planning and Medicare guidelines in regards to home care, TCU and LTC.  The patient lives with her daughter and son. Her daughter reports she was with her at 4:00 am and the patient's son got home at 7:00 am and found her laying on her stomach on the floor of her bedroom.  Daughter stated, she is unable to safely care for patient. Discussed home care and LTC.  Daughter was provided with Medicare certified nursing home list. Choices are as follows:     [facility] (Main: [phone] Admissions: [phone] Fax: [phone]) -[LS1.1] Pending[LS1.2]   [facility] (Phone: [phone]) Fax: ([phone]) -[LS1.1] Pending[LS1.2]   [facility] Phone: ([phone]) Fax: ([phone]) - No bed available[LS1.1].[LS1.3]    [facility] (Phone: [phone] Fax: [phone]) -[LS1.1]  Pending[LS1.2]   [facility] (Admissions Phone: [phone] Main Phone: [phone] Fax: [phone]) -[LS1.1]  Pending[LS1.2]    Nursing home referrals are pending.  Discussed financing for LTC and the need to self pay.  Referral made to [facility] regarding MA.  Care Transitions will assist with discharge  planning.    PLAN    LTC MCU      Discharge Planner   Discharge Plans in progress: LT MCU  Barriers to discharge plan: Bed availability  Follow up plan: Referral to clinic care coordinator       Entered by: Berna Martino 01/15/2018 11:32 AM           Berna Martino RN, Care Coordinator [phone][LS1.1]       Revision History        User Key Date/Time User Provider Type Action    > LS1.2 1/15/2018  4:04 PM Berna Martino RN Case Manager Sign     LS1.3 1/15/2018 11:48 AM Berna Martino RN Case Manager      LS1.1 1/15/2018 11:32 AM Berna Martino RN Case Manager                      Progress Notes - Physician (Notes from 01/14/18 through 01/17/18)      Progress Notes by Berna Martino RN at 1/17/2018  1:16 PM     Author:  Berna Martino RN Service:  (none) Author Type:      Filed:  1/17/2018  1:22 PM Date of Service:  1/17/2018  1:16 PM Creation Time:  1/17/2018  1:16 PM    Status:  Signed :  Berna Martino RN ()         Transition Communication Hand-off for Care Transitions to Next Level of Care Provider/Discharge:    Name: Sherri Bender  MRN #: [id number]  Primary Care Provider: Kameron Cool  Primary Care MD Name: Dr. Cool  Primary Clinic: [address] [place] [id number]  Primary Care Clinic Name: [facility]  Reason for Hospitalization:  Falls frequently [R29.6]  Fall, initial encounter [W19.XXXA]  Contusion, cheek, initial encounter [S00.83XA]  History of dementia [Z86.59]  Admit Date/Time: 1/14/2018  1:25 PM  Discharge Date: 1/17/18  Payor Source: Payor: MEDICA / Plan: MEDICA PRIME SOLUTION / Product Type: Indemnity /     Readmission Assessment Measure ([place]) Risk Score/category: Average        Reason for Communication Hand-off Referral:  Patient is going to a LT MCU.    Discharge Plan:  [facility] (Phone: [phone] Fax: [phone]).       Concern for non-adherence with plan of care:   Y/N No  Discharge Needs  Assessment:  Needs       Most Recent Value    Skilled Nursing Facility -- [[facility]]    PAS Number [id number]        PAS-RR:  Per DHS regulation, CTS team completed and submitted PAS-RR to [place] Board on Aging Direct Connect via the [employer] [facility]. CTS team advised SNF and they are aware a PAS-RR has been submitted.   CTS team reviewed with pt or health care agent that they may be contacted for a follow up appointment within 10 days of hospital discharge if SNF stay is <30 days. Contact information for [employer] was also provided.   Pt or health care agent verbalized understanding.   PAS-RR # [id number]        Berna Martnio RN, Care Coordinator[LS1.1]         Revision History        User Key Date/Time User Provider Type Action    > LS1.1 1/17/2018  1:22 PM Berna Martino RN Case Manager Sign            Progress Notes by Bryce Holcomb MD at 1/17/2018 12:47 PM     Author:  Bryce Holcomb MD Service:  Hospitalist Author Type:  Physician    Filed:  1/17/2018 12:47 PM Date of Service:  1/17/2018 12:47 PM Creation Time:  1/17/2018 12:47 PM    Status:  Signed :  Bryce Holcomb MD (Physician)         HISTORY OF PRESENT ILLNESS:  The patient Sherri Bender is an 85-year-old female with worsening dementia who presented with progressive confusion and a fall at home.  She is cared for by her daughter who lives with her in their home.  The patient's daughter notes that Sherri has declined over the last 1-2 months with confusion, weakness and increased difficulty with her activities of daily living.  She has had agitation at night.  She was found down on the floor at 0700 hrs the day of admission.  Her daughter had last seen her at 0400 hrs.  The patient's daughter feels that she can no longer safely take care of Sherri in their home.       Upon admission the patient had a CT scan of her head and neck which showed no acute problems.  An  x-ray of her pelvis was  negative for fracture.  She was admitted overnight.  Dr. Blue stopped her Unisom and Ativan and started Risperdal.  I ultimately stopped the Resperdal and started seroquel with twice daily dosing. This seemed to control some agitated behaviors that the patient was having. I talked to her daughter at length-she has some concern for Lewy Body Dementia-but the pt doesn't have this as a formal diagnosis. At this point I would prefer to use no antipsychotics or benzos-but we needed to use something to control anxiety/agitation. I will allow her to use ativan prn for anxiety which was being used at bedtime at home for agitation. A formal [place]-psych consult could be considered if she is having behavioral problems.      ASSESSMENT:   Worsening dementia apparently Alzheimer variety/some agitation and anxiety   Mechanical fall with abrasions of both knees.   Hypertension.   Depression.   Urinary incontinence.   Chronic rhinorrhea.   History of environmental allergies.       PLAN:     1.  The patient will be discharged to a long-term care facility when a bed becomes available.   2.  She will continue taking Seroquel that was started during this hospitalization  3.  Ativan can be continued as a prn med.     4.  We will continue Aricept, hydrochlorothiazide, Losartan, Claritin, aspirin and sertraline.     5.  She should have Primary Care follow-up within 10 days-a [facility] psych consult could be considered.     Current Discharge Medication List      START taking these medications    Details   !! QUEtiapine (SEROQUEL) 50 MG tablet Take 1 tablet (50 mg) by mouth At Bedtime  Qty: 60 tablet      !! QUEtiapine (SEROQUEL) 25 MG tablet Take 1 tablet (25 mg) by mouth daily (with breakfast)  Qty: 60 tablet      risperiDONE (RISPERDAL) 0.25 MG tablet Take 1 tablet (0.25 mg) by mouth At Bedtime    Associated Diagnoses: History of dementia       !! - Potential duplicate medications found. Please discuss with provider.      CONTINUE these  medications which have CHANGED    Details   LORazepam (ATIVAN) 0.5 MG tablet Take 1 tablet (0.5 mg) by mouth every 6 hours as needed for anxiety  Qty: 15 tablet, Refills: 0    Associated Diagnoses: History of dementia         CONTINUE these medications which have NOT CHANGED    Details   CRANBERRY PO Take 300 mg by mouth 3 times daily      MELATONIN PO Take 10 mg by mouth At Bedtime      loratadine (CLARITIN) 10 MG tablet Take 10 mg by mouth daily      Nutritional Supplements (ENSURE) LIQD Take by mouth as needed      donepezil (ARICEPT) 10 MG tablet Take 1 tablet (10 mg) by mouth At Bedtime  Qty: 30 tablet, Refills: 0    Associated Diagnoses: Mild dementia      losartan (COZAAR) 25 MG tablet Take 1 tablet (25 mg) by mouth daily (Needs follow-up appointment for this medication)  Qty: 30 tablet, Refills: 0    Associated Diagnoses: HTN, goal below 150/90      hydrochlorothiazide (HYDRODIURIL) 25 MG tablet Take 1 tablet (25 mg) by mouth daily (Needs follow-up appointment for this medication)  Qty: 30 tablet, Refills: 0    Associated Diagnoses: Edema, unspecified type      sertraline (ZOLOFT) 50 MG tablet Take 1 tablet (50 mg) by mouth daily (Needs follow-up appointment for this medication)  Qty: 30 tablet, Refills: 0    Associated Diagnoses: Anxiety      ASPIRIN CAPS 81 MG OR 1 TABLET DAILY         STOP taking these medications       doxylamine (UNISOM) 25 MG TABS tablet Comments:   Reason for Stopping:             Unresulted Labs Ordered in the Past 30 Days of this Admission     No orders found from 11/15/2017 to 1/15/2018.            [JE1.1]         Revision History        User Key Date/Time User Provider Type Action    > JE1.1 1/17/2018 12:47 PM Bryce Holcomb MD Physician Sign            Progress Notes by Bryce Holcomb MD at 1/17/2018  9:23 AM     Author:  Bryce Holcomb MD Service:  Hospitalist Author Type:  Physician    Filed:  1/17/2018 10:02 AM Date of Service:  1/17/2018  9:23 AM Creation  Time:  1/17/2018  9:23 AM    Status:  Addendum :  Bryce Holcomb MD (Physician)         [facility] Hospitalist Service      Subjective:[JE1.1]  Per rn -has not needed sitter  Slept well  Ate better this am then previous  Mildly anxious before bedtime and again this am[JE1.2]    Review of Systems:[JE1.1]  unable[JE1.2]    Physical Exam:  Vitals Were Reviewed    Patient Vitals for the past 16 hrs:   BP Temp Temp src Pulse Heart Rate Resp SpO2   01/17/18 0723 122/58 97.6  F (36.4  C) Oral 94 94 16 93 %   01/16/18 2326 140/71 98.1  F (36.7  C) Oral 103 - 18 91 %   01/16/18 1936 157/75 97.1  F (36.2  C) Oral - 110 18 94 %         Intake/Output Summary (Last 24 hours) at 01/17/18 0923  Last data filed at 01/17/18 0849   Gross per 24 hour   Intake             1170 ml   Output              150 ml   Net             1020 ml[JE1.1]       GENERAL APPEARANCE: healthy, alert and no distress  RESP: lungs clear to auscultation - no rales, rhonchi or wheezes  CV: regular rate and rhythm, normal S1 S2, no S3 or S4 and no murmur, click or rub   ABDOMEN: soft, nontender, no HSM or masses and bowel sounds normal  MS: no clubbing, cyanosis; no edema  SKIN: clear without significant rashes or lesions  NEURO: currently calm , confused    Lab:[JE1.2]  Recent Labs   Lab Test  01/14/18   1343  09/03/15   1659   NA  140  137   POTASSIUM  3.6  3.6   CHLORIDE  106  100   CO2  24  30   ANIONGAP  10  7   GLC  142*  86   BUN  17  16   CR  0.82  0.65   ZACK  8.6  9.3     CBC RESULTS:   Recent Labs   Lab Test  01/14/18   1343  08/26/13   1841   WBC  8.2  7.7   RBC  3.79*  4.21   HGB  11.7  12.8   HCT  35.3  39.3   PLT  226  239       No results found for this or any previous visit (from the past 24 hour(s)).    Assessment and Plan:    Sherri Bender is a 85 year old female with hx dementia who presents with progressive confusion and increased caretaker needs over past 1-2 months      Dementia - progression to the point of  "family feeling like she needs placement. Some behavior problems gwen at night. Started on respirdal.. Cont aricept. Holding ativan. Holding Unasom.  Needed iv haldol[JE1.1] on 1/16--better now with seroquel[JE1.2]      Fall- presumed mechanical, based on past hx of witnessed fall when attempting to walk unassisted- abrasions to face ( right cheek) and bilat knee- tylenol and ice prn.ck pending      HTN- cont cozaar , hctz      Depression- cont zoloft      Urinary incontinence      Chronic rhinnorhea, hx environmental alllergies- has been on claritin. Will hold for now in case claritin increasing confusion      FEN- has had poor po intakeper daughter- will start ensure with meals. Has been on one ensure a day at home      Discussion- long discussion with daughter 1/16/18 , she has concern of Lewy Body ds. Discussed with pharm. using seroquel during day and larger dose at night. Ativan prn. Neither antipsychotic or benzo's are optimum but may be necessary at least in short term. [JE1.1]hope to [facility].[JE1.2] Will increase AM seroquel dose to 25.[JE1.3]        [JE1.1]       Revision History        User Key Date/Time User Provider Type Action    > JE1.3 1/17/2018 10:02 AM Bryce Holcomb MD Physician Addend     JE1.2 1/17/2018  9:29 AM Bryce Holcomb MD Physician Sign     JE1.1 1/17/2018  9:23 AM Bryce Holcomb MD Physician             Progress Notes by Yaima Luciano RN at 1/17/2018  7:06 AM     Author:  Mustapha, Yaima, RN Service:  Nursing Author Type:  Registered Nurse    Filed:  1/17/2018  7:07 AM Date of Service:  1/17/2018  7:06 AM Creation Time:  1/17/2018  7:06 AM    Status:  Signed :  Yaima Luciano RN (Registered Nurse)         Pt slept quietly throughout the night. Arouses to voice. States, \"I'm fine\".[SB1.1]      Revision History        User Key Date/Time User Provider Type Action    > SB1.1 1/17/2018  7:07 AM Yaima Luciano, RN Registered Nurse Sign            Progress Notes by " Alexandra Kwong RN at 1/16/2018  6:49 PM     Author:  Alexandra Kwong RN Service:  (none) Author Type:  Registered Nurse    Filed:  1/16/2018  6:50 PM Date of Service:  1/16/2018  6:49 PM Creation Time:  1/16/2018  6:49 PM    Status:  Signed :  Alexandra Kwong RN (Registered Nurse)         Pt has been up to bathroom assist 1 with walker. Needs constant ques and directions. Is pleasant and cooperative. Awaiting placement. Family is at bedside.[JA1.1] /77  Pulse 81  Temp 99  F (37.2  C) (Oral)  Resp 18  SpO2 92%[JA1.2]       Revision History        User Key Date/Time User Provider Type Action    > JA1.2 1/16/2018  6:50 PM Alexandra Kwong RN Registered Nurse Sign     JA1.1 1/16/2018  6:49 PM Alexandra Kwong RN Registered Nurse             Progress Notes by Bryce Holcomb MD at 1/16/2018  8:59 AM     Author:  Bryce Holcomb MD Service:  Hospitalist Author Type:  Physician    Filed:  1/16/2018 10:00 AM Date of Service:  1/16/2018  8:59 AM Creation Time:  1/16/2018  8:59 AM    Status:  Addendum :  Bryce Holcomb MD (Physician)         [facility] Hospitalist Service      Subjective:[JE1.1]  Confused  Pt was given ativan most nights by daughter  Would get angry[JE1.2]      Review of Systems:[JE1.1]  unable[JE1.2]    Physical Exam:  Vitals Were Reviewed[JE1.1]    Patient Vitals for the past 16 hrs:   BP Temp Temp src Heart Rate Resp SpO2   01/16/18 0747 140/71 97.1  F (36.2  C) Oral 99 18 92 %   01/16/18 0730 - 97.4  F (36.3  C) Oral 60 - 100 %   01/16/18 0026 113/72 97.3  F (36.3  C) Oral 98 18 92 %[JE1.3]         Intake/Output Summary (Last 24 hours) at 01/16/18 0900  Last data filed at 01/15/18 1800   Gross per 24 hour   Intake              560 ml   Output                0 ml   Net              560 ml[JE1.1]       GENERAL APPEARANCE: alert, calm , confused  EYES: conjunctiva clear, eyes grossly normal  RESP: lungs clear to auscultation - no  rales, rhonchi or wheezes  CV: regular rate and rhythm, normal S1 S2, no S3 or S4 and no murmur, click or rub   ABDOMEN: soft, nontender, no HSM or masses and bowel sounds normal  MS: no clubbing, cyanosis; no edema  SKIN: clear without significant rashes or lesions  NEURO:   Confused, no focal lesion    Lab:[JE1.2]  Recent Labs   Lab Test  01/14/18   1343  09/03/15   1659   NA  140  137   POTASSIUM  3.6  3.6   CHLORIDE  106  100   CO2  24  30   ANIONGAP  10  7   GLC  142*  86   BUN  17  16   CR  0.82  0.65   ZACK  8.6  9.3     CBC RESULTS:   Recent Labs   Lab Test  01/14/18   1343  08/26/13   1841   WBC  8.2  7.7   RBC  3.79*  4.21   HGB  11.7  12.8   HCT  35.3  39.3   PLT  226  239[JE1.1]       No results found for this or any previous visit (from the past 24 hour(s)).[JE1.3]    Assessment and Plan:    Sherri Bender is a 85 year old female with hx dementia who presents with progressive confusion and increased caretaker needs over past 1-2 months      Dementia - progression to the point of family feeling like she needs placement. Some behavior problems gwen at night. Started on respirdal.. Cont aricept. Holding ativan.[JE1.1] Holding Unasom.  Needed iv haldol yesterday.[JE1.3]     Fall- presumed mechanical, based on past hx of witnessed fall when attempting to walk unassisted- abrasions to face ( right cheek) and bilat knee- tylenol and ice prn.ck pending     HTN- cont cozaar[JE1.1] , hctz[JE1.3]     Depression- cont zoloft     Urinary incontinence     Chronic rhinnorhea, hx environmental alllergies- has been on claritin. Will hold for now in case claritin increasing confusion     FEN- has had poor po intakeper daughter- will start ensure with meals. Has been on one ensure a day at home      Discussion-[JE1.1] long discussion with daughter, she has concern of Lewy Body ds. Discussed with pharm. Will try low dose seroquel during day and larger dose at night. Ativan prn. Neither antipsychotic or benzo's are  optimum but may be necessary[JE1.4] at least in short term[JE1.5].[JE1.4] [JE1.1]Will discuss with social service.[JE1.6]       [JE1.1]         Revision History        User Key Date/Time User Provider Type Action    > JE1.5 1/16/2018 10:00 AM Bryce Holcomb MD Physician Addend     [N/A] 1/16/2018  9:40 AM Bryce Holcomb MD Physician Addend     JE1.6 1/16/2018  9:39 AM Bryce Holcomb MD Physician Sign     JE1.4 1/16/2018  9:35 AM Bryce Holcomb MD Physician      JE1.2 1/16/2018  9:25 AM Bryce Holcomb MD Physician      JE1.3 1/16/2018  9:00 AM Bryce Holcomb MD Physician      JE1.1 1/16/2018  8:59 AM Bryce Holcomb MD Physician             Progress Notes by Yaima Luciano RN at 1/15/2018 10:24 PM     Author:  Mustapha, Yaima, RN Service:  Nursing Author Type:  Registered Nurse    Filed:  1/15/2018 10:26 PM Date of Service:  1/15/2018 10:24 PM Creation Time:  1/15/2018 10:24 PM    Status:  Signed :  Yaima Luciano RN (Registered Nurse)         Very little supper intake, did take in ice cream for supper.   At HS, took in 1/2 of Ensure mixed with ice cream.   Continues to be confused and argument sarah at times, requesting to speak to  and needs to leave, able to redirect back to bed at this time.[SB1.1]      Revision History        User Key Date/Time User Provider Type Action    > SB1.1 1/15/2018 10:26 PM Yaima Luciano RN Registered Nurse Sign            Progress Notes by Bryce Holcomb MD at 1/15/2018 10:15 AM     Author:  Bryce Holcomb MD Service:  Hospitalist Author Type:  Physician    Filed:  1/15/2018 10:24 AM Date of Service:  1/15/2018 10:15 AM Creation Time:  1/15/2018 10:15 AM    Status:  Signed :  Bryce Holcomb MD (Physician)         [facility]      Subjective:[JE1.1]  Upset  Wonders where  is[JE1.2]    Review of Systems:[JE1.1]  Unable to obtain due to confusion[JE1.2]    Physical Exam:  Vitals  Were Reviewed    Patient Vitals for the past 16 hrs:   BP Temp Temp src Pulse Resp SpO2   01/15/18 0730 116/59 98  F (36.7  C) Oral 81 18 95 %   01/15/18 0216 111/42 97.3  F (36.3  C) Oral 78 18 92 %   01/14/18 1914 142/67 98.3  F (36.8  C) Oral 77 18 94 %       No intake or output data in the 24 hours ending 01/15/18 1015[JE1.1]    GENERAL APPEARANCE: tearful  EYES: conjunctiva clear, eyes grossly normal  HENT: external ears and nose normal   NECK: supple, no masses or adenopathy  RESP: lungs clear to auscultation - no rales, rhonchi or wheezes  CV: regular rate and rhythm, normal S1 S2, no S3 or S4 and no murmur, click or rub   ABDOMEN: soft, nontender, no HSM or masses and bowel sounds normal  MS: no clubbing, cyanosis; no edema  SKIN: clear without significant rashes or lesions  NEURO: knows she is in hospital, doesn't know time or events    Lab:  Re[JE1.2]cent Labs   Lab Test  01/14/18   1343  09/03/15   1659   NA  140  137   POTASSIUM  3.6  3.6   CHLORIDE  106  100   CO2  24  30   ANIONGAP  10  7   GLC  142*  86   BUN  17  16   CR  0.82  0.65   ZACK  8.6  9.3     CBC RESULTS:   Recent Labs   Lab Test  01/14/18   1343  08/26/13   1841   WBC  8.2  7.7   RBC  3.79*  4.21   HGB  11.7  12.8   HCT  35.3  39.3   PLT  226  239       Results for orders placed or performed during the hospital encounter of 01/14/18 (from the past 24 hour(s))   CBC with platelets differential   Result Value Ref Range    WBC 8.2 4.0 - 11.0 10e9/L    RBC Count 3.79 (L) 3.8 - 5.2 10e12/L    Hemoglobin 11.7 11.7 - 15.7 g/dL    Hematocrit 35.3 35.0 - 47.0 %    MCV 93 78 - 100 fl    MCH 30.9 26.5 - 33.0 pg    MCHC 33.1 31.5 - 36.5 g/dL    RDW 13.0 10.0 - 15.0 %    Platelet Count 226 150 - 450 10e9/L    Diff Method Automated Method     % Neutrophils 76.6 %    % Lymphocytes 15.4 %    % Monocytes 7.0 %    % Eosinophils 0.7 %    % Basophils 0.2 %    % Immature Granulocytes 0.1 %    Absolute Neutrophil 6.3 1.6 - 8.3 10e9/L    Absolute Lymphocytes 1.3  0.8 - 5.3 10e9/L    Absolute Monocytes 0.6 0.0 - 1.3 10e9/L    Absolute Eosinophils 0.1 0.0 - 0.7 10e9/L    Absolute Basophils 0.0 0.0 - 0.2 10e9/L    Abs Immature Granulocytes 0.0 0 - 0.4 10e9/L   Comprehensive metabolic panel   Result Value Ref Range    Sodium 140 133 - 144 mmol/L    Potassium 3.6 3.4 - 5.3 mmol/L    Chloride 106 94 - 109 mmol/L    Carbon Dioxide 24 20 - 32 mmol/L    Anion Gap 10 3 - 14 mmol/L    Glucose 142 (H) 70 - 99 mg/dL    Urea Nitrogen 17 7 - 30 mg/dL    Creatinine 0.82 0.52 - 1.04 mg/dL    GFR Estimate 66 >60 mL/min/1.7m2    GFR Estimate If Black 80 >60 mL/min/1.7m2    Calcium 8.6 8.5 - 10.1 mg/dL    Bilirubin Total 0.3 0.2 - 1.3 mg/dL    Albumin 3.3 (L) 3.4 - 5.0 g/dL    Protein Total 6.6 (L) 6.8 - 8.8 g/dL    Alkaline Phosphatase 67 40 - 150 U/L    ALT 19 0 - 50 U/L    AST 18 0 - 45 U/L   CK total   Result Value Ref Range    CK Total 180 30 - 225 U/L   UA reflex to Microscopic   Result Value Ref Range    Color Urine Yellow     Appearance Urine Clear     Glucose Urine Negative NEG^Negative mg/dL    Bilirubin Urine Negative NEG^Negative    Ketones Urine Negative NEG^Negative mg/dL    Specific Gravity Urine 1.009 1.003 - 1.035    Blood Urine Negative NEG^Negative    pH Urine 5.0 5.0 - 7.0 pH    Protein Albumin Urine Negative NEG^Negative mg/dL    Urobilinogen mg/dL Normal 0.0 - 2.0 mg/dL    Nitrite Urine Negative NEG^Negative    Leukocyte Esterase Urine Negative NEG^Negative    Source Catheterized Urine    Head CT w/o contrast    Narrative    CT SCAN OF THE HEAD WITHOUT CONTRAST   1/14/2018 3:36 PM     HISTORY: Fall, found on stomach. Increasing falls at home.  Dementia.       TECHNIQUE:  Axial images of the head and coronal reformations without  IV contrast material. Radiation dose for this scan was reduced using  automated exposure control, adjustment of the mA and/or kV according  to patient size, or iterative reconstruction technique.    COMPARISON: Head CT 9/4/2015.    FINDINGS: There  is no evidence of intracranial hemorrhage, mass, acute  infarct or anomaly. There is generalized atrophy of the brain. There  is low attenuation in the white matter of the cerebral hemispheres  consistent with sequelae of small vessel ischemic disease.    The visualized portions of the sinuses and mastoids appear normal. The  bony calvarium and bones of the skull base appear intact. Bilateral  pseudophakia.      Impression    IMPRESSION:     1. No evidence of acute intracranial hemorrhage, mass, or herniation.  2. There is generalized atrophy of the brain. White matter changes are  present in the cerebral hemispheres that are consistent with small  vessel ischemic disease in this age patient.      PAYTON PRICE MD   Cervical spine CT w/o contrast    Narrative    CT CERVICAL SPINE WITHOUT CONTRAST   1/14/2018 3:37 PM     HISTORY: Fall, found on stomach.  Evaluate for acute bony process due  to trauma history of dementia increasing frequency of falls, Fall,  found on stomach.  Evaluate for acute bony process due to trauma  history of dementia increasing frequency of falls;      TECHNIQUE: Axial images of the cervical spine were obtained without  intravenous contrast. Multiplanar reformations were performed.   Radiation dose for this scan was reduced using automated exposure  control, adjustment of the mA and/or kV according to patient size, or  iterative reconstruction technique.    COMPARISON: None.    FINDINGS: There is no evidence of fracture. Alignment is normal.      Craniocervical junction: Normal.     C1-C2:  Calcified pannus is seen posterior to the odontoid. This can  be seen in patients with calcium pyrophosphate deposition disease.     C2-C3:  Mild annular disc bulge.     C3-C4:  Annular disc bulge. Central canal normal.     C4-C5:  Loss of disc space height. Mild bulge. Mild degenerative  change in the facet joints.     C5-C6:  Loss of disc space height. Mild bulge. Degenerative change in  the facet  joints, left greater than right.      C6-C7:  Mild annular disc bulge. Central canal normal.      C7-T1:   Normal disc, facet joints, spinal canal and neural foramina.      Impression    IMPRESSION:    1. Negative for fracture.  2. Degenerative change.    NALDO HUFFMAN MD   Pelvis XR w/ unilateral hip left    Narrative    PELVIS AND HIP LEFT ONE VIEW   1/14/2018 3:55 PM     HISTORY: Found on stomach, fall. Increasing falls. Unstable gait,  evaluate for bony process due to fall.    COMPARISON: None.      Impression    IMPRESSION: No acute fracture or dislocation identified. Anatomic  alignment. Pelvic calcifications could represent fibroids or ovarian  calcifications.       Assessment and Plan:    Sherri Bender is a 85 year old female with hx dementia who presents with progressive confusion and increased caretaker needs over past 1-2 months     Dementia - progression to the point of family feeling like she needs placement. Some behavior problems gwen at night. Will try risperdal.Will ask soc service to see, OT/PT. Cont aricept. Will hold ativan, unisom, claritin  as may be making her more confused    Fall- presumed mechanical, based on past hx of witnessed fall when attempting to walk unassisted- abrasions to face ( right cheek) and bilat knee- tylenol and ice prn.ck pending    HTN- cont cozaar but stop hctz for now    Depression- cont zoloft    Urinary incontinence- will hold hctz as this could be making it worse    Chronic rhinnorhea, hx environmental alllergies- has been on claritin. Will hold for now in case claritin increasing confusion    FEN- has had poor po intakeper daughter- will start ensure with meals. Has been on one ensure a day at home   [JE1.1]  Discussion- needs placement, will continue respirdal and off ativan[JE1.3]    [JE1.1]       Revision History        User Key Date/Time User Provider Type Action    > JE1.3 1/15/2018 10:24 AM Bryce Holcomb MD Physician Sign     JE1.2 1/15/2018  "10:22 AM Bryce Holcomb MD Physician      JE1.1 1/15/2018 10:15 AM Bryce Holcomb MD Physician             Progress Notes by Deyanira Elena RN at 1/14/2018  6:08 PM     Author:  Deyanira Elena RN Service:  (none) Author Type:  Registered Nurse    Filed:  1/14/2018  6:09 PM Date of Service:  1/14/2018  6:08 PM Creation Time:  1/14/2018  6:08 PM    Status:  Signed :  Deyanira Elena RN (Registered Nurse)         [facility] ADMISSION NOTE    Patient admitted to room 2301 at approximately 1750 via cart from emergency room. Patient was accompanied by transport tech.     Verbal SBAR report received from [facility] prior to patient arrival.     Patient ambulated to bed with one assist. Patient not orientated, confused and \"lost her family\". The patient is not having any pain.  . Admission vital signs: Blood pressure 150/68, pulse 79, temperature 97.8  F (36.6  C), temperature source Axillary, resp. rate 18, SpO2 95 %, not currently breastfeeding. Patient was oriented to plan of care, call light, bed controls, tv, telephone, bathroom and visiting hours.     The following safety risks were identified during admission: fall. Yellow risk band applied: MAXIMINO.     Deyanira Elena[LL1.1]       Revision History        User Key Date/Time User Provider Type Action    > LL1.1 1/14/2018  6:09 PM Deyanira Elena, RN Registered Nurse Sign            ED Provider Notes by Ad Melendez MD at 1/14/2018  1:25 PM     Author:  Ad Melendez MD Service:  Emergency Medicine Author Type:  Physician    Filed:  1/14/2018  4:28 PM Date of Service:  1/14/2018  1:25 PM Creation Time:  1/14/2018  1:32 PM    Status:  Signed :  Ad Melendez MD (Physician)           History[EA1.1]     Chief Complaint   Patient presents with     Fall     unknown time this AM, pt was found on stomach by family.  pt has had multiple falls recently.  dementia at baseline, failure to thrive at home recently - does not want to eat or drink. "  no c/o pain.[EA2.1]     HPI[EA1.1]  Sherri Bender is a 85 year old female with history of osteopenia, varicose veins of leg, mild dementia, and mild major depression who presents to the ED after a fall. Patient's daughter assisted with history. Her daughter reports she was with her at 4:00 am and the patient's son got home at 7:00 am and found her laying on her stomach on the floor of her bedroom. Her daughter reports the floor was carpet, but there was a wooden chair stuck under her head. Patient told her daughter her face hurt. Her daughter noticed she was walking with her right leg turned in a dragging behind her leg leg after the fall. Patient's daughter reports she has fallen 3-4 times before with her and never remembers it. Her daughter reports she gets up to go to the bathroom often on her own, refusing to use a walker, with her falls typically occurring when she is lunging between furniture to hold on to. Patient's daugher reports her demenia has been worsening significantly over the past 2 months. Patient daughter said she does not want to take her back home and would like to set up an arrangement for her to be in a memory care center at a nursing home.[EA1.2]     Social History: Patient lives in [place], [place]. Patient is with her daughter.     Past Medical History:[EA1.1]  Past Medical History:   Diagnosis Date     Environmental allergies     spring, fall     Menopause age 45     Osteoarthritis      Pneumonia 1960s[EA2.1]       Medications:[EA1.1]  Current Outpatient Prescriptions   Medication Sig Dispense Refill     CRANBERRY PO Take 300 mg by mouth 3 times daily       doxylamine (UNISOM) 25 MG TABS tablet Take 25 mg by mouth At Bedtime       MELATONIN PO Take 10 mg by mouth At Bedtime       loratadine (CLARITIN) 10 MG tablet Take 10 mg by mouth daily       donepezil (ARICEPT) 10 MG tablet Take 1 tablet (10 mg) by mouth At Bedtime 30 tablet 0     losartan (COZAAR) 25 MG tablet Take 1 tablet (25  mg) by mouth daily (Needs follow-up appointment for this medication) 30 tablet 0     hydrochlorothiazide (HYDRODIURIL) 25 MG tablet Take 1 tablet (25 mg) by mouth daily (Needs follow-up appointment for this medication) 30 tablet 0     LORazepam (ATIVAN) 0.5 MG tablet Take 1 tablet (0.5 mg) by mouth daily as needed for anxiety 30 tablet 0     sertraline (ZOLOFT) 50 MG tablet Take 1 tablet (50 mg) by mouth daily (Needs follow-up appointment for this medication) 30 tablet 0     ASPIRIN CAPS 81 MG OR 1 TABLET DAILY[EA2.1]         Allergies:[EA1.1]  Allergies   Allergen Reactions     Nkda [No Known Drug Allergies][EA2.1]        Problem List:[EA1.1]    Patient Active Problem List    Diagnosis Date Noted     Mild dementia 11/11/2015     Priority: Medium     SLUMS score 19-20 fall 2015       Memory changes 01/30/2015     Priority: Medium     Mild major depression (H) 10/17/2013     Priority: Medium     HTN, goal below 150/90 10/17/2013     Priority: Medium     Advanced directives, counseling/discussion 02/16/2012     Priority: Medium     Advance Directive Problem List Overview:   Name Relationship Phone    Primary Health Care Agent            Alternative Health Care Agent          Discussed advance care planning with patient; information given to patient to review. 2/16/2012        [place] (hard of hearing) 07/19/2011     Priority: Medium     Anxiety 07/19/2011     Priority: Medium     CARDIOVASCULAR SCREENING; LDL GOAL LESS THAN 160 10/31/2010     Priority: Medium     Osteopenia 02/09/2010     Priority: Medium     With recent wrist fracture       Varicose vein of leg 02/09/2010     Priority: Medium     Loss of height 02/09/2010     Priority: Medium     Family history of ischemic heart disease 02/09/2010     Priority: Medium     S/P lumpectomy of breast 02/09/2010     Priority: Medium     left breast upper outer quadrant benign lesion in 20's       SENSONRL HEAR LOSS,BILAT 11/07/2006     Priority: Medium[EA2.1]        Past  Medical History:[EA1.1]    Past Medical History:   Diagnosis Date     Environmental allergies      Menopause age 45     Osteoarthritis      Pneumonia 1960s[EA2.1]       Past Surgical History:[EA1.1]    Past Surgical History:   Procedure Laterality Date     APPENDECTOMY       CATARACT IOL, RT/LT  4/2010    left     COLONOSCOPY  6/20/2011    Procedure:COLONOSCOPY; Surgeon:KING VENCES; Location:[facility]     SURGICAL HISTORY OF -       LEFT ganglion cyst ex     SURGICAL HISTORY OF -       LEFT breast cyst ex     TONSILLECTOMY[EA2.1]         Family History:[EA1.1]    Family History   Problem Relation Age of Onset     CANCER Mother      skin     HEART DISEASE Mother      HEART DISEASE Father      Allergies Brother      CANCER Brother      lung[EA2.1]       Social History:  Marital Status:   [5][EA1.1]  Social History   Substance Use Topics     Smoking status: Former Smoker     Smokeless tobacco: Never Used      Comment: 1972     Alcohol use Yes      Comment: rarely[EA2.1]        Medications:[EA1.1]      CRANBERRY PO   doxylamine (UNISOM) 25 MG TABS tablet   MELATONIN PO   loratadine (CLARITIN) 10 MG tablet   donepezil (ARICEPT) 10 MG tablet   losartan (COZAAR) 25 MG tablet   hydrochlorothiazide (HYDRODIURIL) 25 MG tablet   LORazepam (ATIVAN) 0.5 MG tablet   sertraline (ZOLOFT) 50 MG tablet   ASPIRIN CAPS 81 MG OR[EA2.1]         Review of Systems   Constitutional:        Failure to thrive at home.  Increasing falls   HENT:        Right cheek contusion   Eyes: Negative.    Respiratory: Negative.    Cardiovascular: Negative.    Gastrointestinal: Negative.    Endocrine: Negative.    Genitourinary: Negative.    Musculoskeletal: Negative.    Skin: Negative.    Allergic/Immunologic: Negative.    Neurological: Positive for weakness.   Hematological: Negative.    Psychiatric/Behavioral: Negative.[EA2.2]        Physical Exam[EA1.1]   BP: 126/65  Heart Rate: 81  Temp: 97.8  F (36.6  C)  Resp: 16  SpO2: 94  %[EA2.1]      Physical Exam   Constitutional: She is oriented to person, place, and time. She appears well-developed and well-nourished. No distress.   HENT:   Head: Normocephalic. Head is with contusion.       Eyes: Conjunctivae and EOM are normal. Pupils are equal, round, and reactive to light. Right eye exhibits no discharge. Left eye exhibits no discharge. No scleral icterus.   Neck: Normal range of motion. Neck supple. No JVD present. No tracheal deviation present. No thyromegaly present.   Pulmonary/Chest: Effort normal and breath sounds normal. No stridor. No respiratory distress. She has no wheezes. She has no rales. She exhibits no tenderness.   Abdominal: Soft. Bowel sounds are normal. She exhibits no distension and no mass. There is no tenderness. There is no rebound.   Lymphadenopathy:     She has no cervical adenopathy.   Neurological: She is alert and oriented to person, place, and time.   Skin: No rash noted. She is not diaphoretic. No erythema. No pallor.   Psychiatric: She has a normal mood and affect. Her behavior is normal. Judgment and thought content normal.[EA2.2]       ED Course[EA1.1]     ED Course[EA2.1]     Procedures               Critical Care time:[EA1.1]  none[EA2.2]                 ED Medications:[EA1.1] none[EA2.3]      ED Vitals:[EA1.1]  Vitals:    01/14/18 1515 01/14/18 1553 01/14/18 1554 01/14/18 1600   BP: 119/69 133/60  124/63   BP Location:  Right arm     Resp:  16     Temp:       TempSrc:       SpO2:   92% 94%[EA2.1]       ED Labs and Imaging:[EA1.1]  Results for orders placed or performed during the hospital encounter of 01/14/18 (from the past 24 hour(s))   CBC with platelets differential   Result Value Ref Range    WBC 8.2 4.0 - 11.0 10e9/L    RBC Count 3.79 (L) 3.8 - 5.2 10e12/L    Hemoglobin 11.7 11.7 - 15.7 g/dL    Hematocrit 35.3 35.0 - 47.0 %    MCV 93 78 - 100 fl    MCH 30.9 26.5 - 33.0 pg    MCHC 33.1 31.5 - 36.5 g/dL    RDW 13.0 10.0 - 15.0 %    Platelet Count 226  150 - 450 10e9/L    Diff Method Automated Method     % Neutrophils 76.6 %    % Lymphocytes 15.4 %    % Monocytes 7.0 %    % Eosinophils 0.7 %    % Basophils 0.2 %    % Immature Granulocytes 0.1 %    Absolute Neutrophil 6.3 1.6 - 8.3 10e9/L    Absolute Lymphocytes 1.3 0.8 - 5.3 10e9/L    Absolute Monocytes 0.6 0.0 - 1.3 10e9/L    Absolute Eosinophils 0.1 0.0 - 0.7 10e9/L    Absolute Basophils 0.0 0.0 - 0.2 10e9/L    Abs Immature Granulocytes 0.0 0 - 0.4 10e9/L   Comprehensive metabolic panel   Result Value Ref Range    Sodium 140 133 - 144 mmol/L    Potassium 3.6 3.4 - 5.3 mmol/L    Chloride 106 94 - 109 mmol/L    Carbon Dioxide 24 20 - 32 mmol/L    Anion Gap 10 3 - 14 mmol/L    Glucose 142 (H) 70 - 99 mg/dL    Urea Nitrogen 17 7 - 30 mg/dL    Creatinine 0.82 0.52 - 1.04 mg/dL    GFR Estimate 66 >60 mL/min/1.7m2    GFR Estimate If Black 80 >60 mL/min/1.7m2    Calcium 8.6 8.5 - 10.1 mg/dL    Bilirubin Total 0.3 0.2 - 1.3 mg/dL    Albumin 3.3 (L) 3.4 - 5.0 g/dL    Protein Total 6.6 (L) 6.8 - 8.8 g/dL    Alkaline Phosphatase 67 40 - 150 U/L    ALT 19 0 - 50 U/L    AST 18 0 - 45 U/L   UA reflex to Microscopic   Result Value Ref Range    Color Urine Yellow     Appearance Urine Clear     Glucose Urine Negative NEG^Negative mg/dL    Bilirubin Urine Negative NEG^Negative    Ketones Urine Negative NEG^Negative mg/dL    Specific Gravity Urine 1.009 1.003 - 1.035    Blood Urine Negative NEG^Negative    pH Urine 5.0 5.0 - 7.0 pH    Protein Albumin Urine Negative NEG^Negative mg/dL    Urobilinogen mg/dL Normal 0.0 - 2.0 mg/dL    Nitrite Urine Negative NEG^Negative    Leukocyte Esterase Urine Negative NEG^Negative    Source Catheterized Urine    Head CT w/o contrast    Narrative    CT SCAN OF THE HEAD WITHOUT CONTRAST   1/14/2018 3:36 PM     HISTORY: Fall, found on stomach. Increasing falls at home.  Dementia.       TECHNIQUE:  Axial images of the head and coronal reformations without  IV contrast material. Radiation dose for this  scan was reduced using  automated exposure control, adjustment of the mA and/or kV according  to patient size, or iterative reconstruction technique.    COMPARISON: Head CT 9/4/2015.    FINDINGS: There is no evidence of intracranial hemorrhage, mass, acute  infarct or anomaly. There is generalized atrophy of the brain. There  is low attenuation in the white matter of the cerebral hemispheres  consistent with sequelae of small vessel ischemic disease.    The visualized portions of the sinuses and mastoids appear normal. The  bony calvarium and bones of the skull base appear intact. Bilateral  pseudophakia.      Impression    IMPRESSION:     1. No evidence of acute intracranial hemorrhage, mass, or herniation.  2. There is generalized atrophy of the brain. White matter changes are  present in the cerebral hemispheres that are consistent with small  vessel ischemic disease in this age patient.      PAYTON PRICE MD   Cervical spine CT w/o contrast    Narrative    CT CERVICAL SPINE WITHOUT CONTRAST   1/14/2018 3:37 PM     HISTORY: Fall, found on stomach.  Evaluate for acute bony process due  to trauma history of dementia increasing frequency of falls, Fall,  found on stomach.  Evaluate for acute bony process due to trauma  history of dementia increasing frequency of falls;      TECHNIQUE: Axial images of the cervical spine were obtained without  intravenous contrast. Multiplanar reformations were performed.   Radiation dose for this scan was reduced using automated exposure  control, adjustment of the mA and/or kV according to patient size, or  iterative reconstruction technique.    COMPARISON: None.    FINDINGS: There is no evidence of fracture. Alignment is normal.      Craniocervical junction: Normal.     C1-C2:  Calcified pannus is seen posterior to the odontoid. This can  be seen in patients with calcium pyrophosphate deposition disease.     C2-C3:  Mild annular disc bulge.     C3-C4:  Annular disc bulge. Central  canal normal.     C4-C5:  Loss of disc space height. Mild bulge. Mild degenerative  change in the facet joints.     C5-C6:  Loss of disc space height. Mild bulge. Degenerative change in  the facet joints, left greater than right.      C6-C7:  Mild annular disc bulge. Central canal normal.      C7-T1:   Normal disc, facet joints, spinal canal and neural foramina.      Impression    IMPRESSION:    1. Negative for fracture.  2. Degenerative change.   Pelvis XR w/ unilateral hip left    Narrative    PELVIS AND HIP LEFT ONE VIEW   1/14/2018 3:55 PM     HISTORY: Found on stomach, fall. Increasing falls. Unstable gait,  evaluate for bony process due to fall.    COMPARISON: None.      Impression    IMPRESSION: No acute fracture or dislocation identified. Anatomic  alignment. Pelvic calcifications could represent fibroids or ovarian  calcifications.[EA2.1]             1:33 PM Patient Assessed.     Assessments & Plan (with Medical Decision Making)   Clinical Impression:[EA1.1] 85-year-old female with a history of dementia on Aricept who presented for concern for fall.  History is obtained from her daughter who is at the bedside.  Patient is known to not want to use a walker at home.  This concerned that she is having increasing falls at home and not thriving at home.  Daughter tells me she takes lorazepam for sundowning at night.  She is also on losartan and hydrochlorothiazide.  Patient was found on her stomach on a carpeted surface in her bedroom after she was last seen at 4 AM.  This is about 3 hours after she was found on the floor as she lives with her daughter and son who is chemically dependent who goes to methadone clinic.  Patient has walked since she was picked up and helped off the floor but seemed to complain some left leg discomfort.  She has redness about her right cheek but has no pain to palpation about the face on my exam.  She is awake but not talking.  She has no gross bony deformity palpation about the  chest.  Her abdomen is soft nondistended without any bruising.  Her pelvis is stable.  Range of motion of her hips and knees are normal without any grimacing of pain and no limb length discrepancy.  Daughter tells me the patient does not like to ask for help and does not use her walker when asked.[EA2.2]    ED Course and Plan:[EA1.1]   Daughter is asking that the patient be admitted for placement of memory care in a nursing home.  Imaging was obtained given her age with unwitnessed fall and concern for increasing fall and failure to thrive.  A CT of her head, cervical spine, x-ray of her pelvis and left hip was obtained.  Blood work was obtained as well as a catheterized urine sample.  Her urine is normal.  She has a normal white count and her hemogram is stable.  Family does not report that the patient takes any anticoagulants or blood thinners[EA2.2].  CT of the head, cervical spine and x-ray of the pelvis and left hip did not show any acute process elated to trauma with reported fall.  Please see the interpreting radiologist's report above.[EA2.3]    I spoke with Dr Blue-admitting hospitalist at[EA2.2] 4.20PM who agreed to assume care and admission. I r[EA2.3]eviewed rationale for admission, family's request, patient's history and presentation, we also discussed interventions and workup and imaging[EA2.2] completed[EA2.3] in the emergency department.[EA2.2]  Daughter who is at the bedside is notified of workup results and imaging results.[EA2.3]      Disclaimer: This note consists of symbols derived from keyboarding, dictation and/or voice recognition software. As a result, there may be errors in the script that have gone undetected. Please consider this when interpreting information found in this chart.      I have reviewed the nursing notes.    I have reviewed the findings, diagnosis, plan and need for follow up with the patient.[EA1.1]       New Prescriptions    No medications on file       Final diagnoses:    Fall, initial encounter - Found on the floor in her bedroom on carpet, face down.   History of dementia   Falls frequently   Contusion, cheek, initial encounter - post- unwitnessed fall.[EA2.1]     This document serves as a record of the services and decisions personally performed and made by Ad Melendez, Jose Alberto. HPI was created on his behalf by   Hannah Sanchez, a trained medical scribe. The creation of this document is based the provider's statements to the medical scribe.  Hannah Sanchez 1:32 PM 1/14/2018    Provider:   The information in this document, created by the medical scribe for me, accurately reflects the services I personally performed and the decisions made by me. I have reviewed and approved this document for accuracy prior to leaving the patient care area.  Ad Melendez, Jose Alberto 1:32 PM 1/14/2018 1/14/2018   [facility], [place] EMERGENCY DEPARTMENT[EA1.1]     Ad Melendez MD  01/14/18 1628  [EA2.4]     Revision History        User Key Date/Time User Provider Type Action    > EA2.4 1/14/2018  4:28 PM Ad Melendez MD Physician Sign     EA2.1 1/14/2018  4:27 PM dA Melendez MD Physician      EA2.3 1/14/2018  3:55 PM Ad Melendez MD Physician      [N/A] 1/14/2018  2:14 PM Hannah Sanchez Share     EA2.2 1/14/2018  2:12 PM Ad Melendez MD Physician Share     EA1.2 1/14/2018  2:12 PM Hannah Sanchez      EA1.1 1/14/2018  2:05 PM Hannah Sanchez            ED Notes by Victoria Cleveland RN at 1/14/2018  4:22 PM     Author:  Victoria Cleveland RN Service:  (none) Author Type:  Registered Nurse    Filed:  1/14/2018  4:23 PM Date of Service:  1/14/2018  4:22 PM Creation Time:  1/14/2018  4:23 PM    Status:  Signed :  Victoria Cleveland, RN (Registered Nurse)         Up to commode pt needed little physical assistance mostly verbal guidance[BA1.1]      Revision History        User Key Date/Time User Provider Type  "Action    > BA1.1 1/14/2018  4:23 PM Victoria Cleveland RN Registered Nurse Sign            ED Notes by Victoria Cleveland RN at 1/14/2018  3:58 PM     Author:  Victoria Cleveland RN Service:  (none) Author Type:  Registered Nurse    Filed:  1/14/2018  3:59 PM Date of Service:  1/14/2018  3:58 PM Creation Time:  1/14/2018  3:59 PM    Status:  Signed :  Victoria Cleveland RN (Registered Nurse)         Daughter commenting \"when they put her upstairs she will have to have someone with her all the time\" \"its not safe for her to go home I can stay awake 24hrs a day to watch her\"[BA1.1]      Revision History        User Key Date/Time User Provider Type Action    > BA1.1 1/14/2018  3:59 PM Victoria Cleveland RN Registered Nurse Sign            ED Notes by Jing Powell RN at 1/14/2018  1:53 PM     Author:  Jing Powell RN Service:  (none) Author Type:  Registered Nurse    Filed:  1/14/2018  1:58 PM Date of Service:  1/14/2018  1:53 PM Creation Time:  1/14/2018  1:58 PM    Status:  Signed :  Jing Powell RN (Registered Nurse)         Family now at bedside.  Pt has been increasingly weak.  Pt is becoming more confused, does not call for help to ambulate.  Daughter states that she is with her mother 24/7 but when she falls asleep mother will try and assist herself to bathroom etc.  Pt son found her at 7am, pt fell between 4a-7a.  Daughter and son waited to call 911 d/t uncertainty if she should come to ED.  Pt son assisted pt back into bed, and pt ate breakfast, toileted etc, prior to daughter wanting pt to come to ED.      Daughter states she just doesn't feel pt is safe at home anymore.[NS1.1]      Revision History        User Key Date/Time User Provider Type Action    > NS1.1 1/14/2018  1:58 PM Jing Powell RN Registered Nurse Sign            ED Notes by Jing Powell RN at 1/14/2018  1:29 PM     Author:  Jing Powell RN Service:  (none) Author Type:  Registered Nurse    Filed:  1/14/2018  " 1:31 PM Date of Service:  1/14/2018  1:29 PM Creation Time:  1/14/2018  1:31 PM    Status:  Signed :  Jing Powell, RN (Registered Nurse)         Pt lives at home with family.  Per EMS, pt not on any blood thinners.  Pt declines pain upon exam.  Per EMS, pt has been failure to thrive recently, pt does not want to eat or drink.  PTA, pt was sleeping in ambulance.  Pt family is on way to ED.[NS1.1]       Revision History        User Key Date/Time User Provider Type Action    > NS1.1 1/14/2018  1:31 PM Jing Powell, RN Registered Nurse Sign            ED Notes by Lo Pulliam at 1/14/2018  1:25 PM     Author:  Lo Pulliam Service:  (none) Author Type:  VIOLET    Filed:  1/14/2018  1:25 PM Date of Service:  1/14/2018  1:25 PM Creation Time:  1/14/2018  1:25 PM    Status:  Signed :  Lo Pulliam ([facility])         Bed: ED08  Expected date:   Expected time:   Means of arrival: Ambulance  Comments:  Sherri     Revision History        User Key Date/Time User Provider Type Action    > MH1.1 1/14/2018  1:25 PM Lo Pulliam [facility] Sign                  Procedure Notes     No notes of this type exist for this encounter.      Progress Notes - Therapies (Notes from 01/14/18 through 01/17/18)     No notes of this type exist for this encounter.

## 2018-01-15 ENCOUNTER — TELEPHONE (OUTPATIENT)
Dept: FAMILY MEDICINE | Facility: CLINIC | Age: 83
End: 2018-01-15

## 2018-01-15 PROCEDURE — 99207 ZZC CDG-CODE CATEGORY CHANGED: CPT | Performed by: FAMILY MEDICINE

## 2018-01-15 PROCEDURE — 25000132 ZZH RX MED GY IP 250 OP 250 PS 637: Mod: GY | Performed by: INTERNAL MEDICINE

## 2018-01-15 PROCEDURE — 25000128 H RX IP 250 OP 636: Performed by: FAMILY MEDICINE

## 2018-01-15 PROCEDURE — 25000132 ZZH RX MED GY IP 250 OP 250 PS 637: Mod: GY | Performed by: FAMILY MEDICINE

## 2018-01-15 PROCEDURE — A9270 NON-COVERED ITEM OR SERVICE: HCPCS | Mod: GY | Performed by: INTERNAL MEDICINE

## 2018-01-15 PROCEDURE — A9270 NON-COVERED ITEM OR SERVICE: HCPCS | Mod: GY | Performed by: FAMILY MEDICINE

## 2018-01-15 PROCEDURE — G0378 HOSPITAL OBSERVATION PER HR: HCPCS

## 2018-01-15 PROCEDURE — 99225 ZZC SUBSEQUENT OBSERVATION CARE,LEVEL II: CPT | Performed by: FAMILY MEDICINE

## 2018-01-15 PROCEDURE — 96374 THER/PROPH/DIAG INJ IV PUSH: CPT

## 2018-01-15 RX ORDER — RISPERIDONE 0.25 MG/1
0.25 TABLET ORAL AT BEDTIME
COMMUNITY
Start: 2018-01-15 | End: 2018-01-23

## 2018-01-15 RX ORDER — QUETIAPINE FUMARATE 25 MG/1
25 TABLET, FILM COATED ORAL ONCE
Status: COMPLETED | OUTPATIENT
Start: 2018-01-15 | End: 2018-01-15

## 2018-01-15 RX ORDER — HALOPERIDOL 5 MG/ML
2-4 INJECTION INTRAMUSCULAR EVERY 4 HOURS PRN
Status: DISCONTINUED | OUTPATIENT
Start: 2018-01-15 | End: 2018-01-17 | Stop reason: HOSPADM

## 2018-01-15 RX ORDER — HALOPERIDOL 5 MG/ML
2-4 INJECTION INTRAMUSCULAR EVERY 6 HOURS PRN
Status: DISCONTINUED | OUTPATIENT
Start: 2018-01-15 | End: 2018-01-15

## 2018-01-15 RX ORDER — HALOPERIDOL 5 MG/ML
2 INJECTION INTRAMUSCULAR ONCE
Status: COMPLETED | OUTPATIENT
Start: 2018-01-15 | End: 2018-01-15

## 2018-01-15 RX ADMIN — Medication 1 CAN: at 21:11

## 2018-01-15 RX ADMIN — HALOPERIDOL LACTATE 2 MG: 5 INJECTION, SOLUTION INTRAMUSCULAR at 17:55

## 2018-01-15 RX ADMIN — LOSARTAN POTASSIUM 25 MG: 25 TABLET, FILM COATED ORAL at 08:26

## 2018-01-15 RX ADMIN — DONEPEZIL HYDROCHLORIDE 10 MG: 10 TABLET, FILM COATED ORAL at 21:11

## 2018-01-15 RX ADMIN — RISPERIDONE 0.25 MG: 0.25 TABLET ORAL at 21:11

## 2018-01-15 RX ADMIN — QUETIAPINE FUMARATE 25 MG: 25 TABLET ORAL at 15:04

## 2018-01-15 RX ADMIN — SERTRALINE HYDROCHLORIDE 50 MG: 50 TABLET ORAL at 08:26

## 2018-01-15 RX ADMIN — Medication 1 CAN: at 08:28

## 2018-01-15 NOTE — TELEPHONE ENCOUNTER
Reason for Call:  Other call back  Pt in hospital for a fall    Detailed comments: Sherri's daughter Bear is calling saying that Sherri is in the hospital for a fall.  She is just being observed now because she has dementia.  Bear is wondering if she can talk to Dr. Cool about maybe changing Sherri's medication protocol.  Please advise.     Phone Number Patient can be reached at: Other phone number:  Bear  (daughter)  [phone]    Best Time: any    Can we leave a detailed message on this number? YES    Call taken on 1/15/2018 at 11:32 AM by Rena Ferro

## 2018-01-15 NOTE — PLAN OF CARE
Problem: Confusion, Chronic (Adult)  Goal: Cognitive/Functional Impairments Minimized  Patient will demonstrate the desired outcomes by discharge/transition of care.   Outcome: No Change  Pt alert, disoriented x 4. A2 with walker. Has difficulty understanding directions for tasks. Neuros intact. History of falls. Unsteady on feet. Incontinent. LS clear. Denies pain, nausea, SOB. /42  Pulse 78  Temp 97.3  F (36.3  C) (Oral)  Resp 18  SpO2 92%

## 2018-01-15 NOTE — PLAN OF CARE
Problem: Patient Care Overview  Goal: Plan of Care/Patient Progress Review  Pt is disoriented x 4, compliant with cares and taking medications.  Appetite poor, incontinent of bowel and bladder, will monitor.

## 2018-01-15 NOTE — PLAN OF CARE
Problem: Confusion, Chronic (Adult)  Goal: Cognitive/Functional Impairments Minimized  Patient will demonstrate the desired outcomes by discharge/transition of care.   Outcome: Declining  Increase agitation, unable to redirect, attempting to get out of bed several times. Requesting to get out and find  (has been  since  per family).  Page out to MD.     Addendum at : Cooperative post Haldol at 1755 and pleasant. Was able to nap for the past hour and then awake, confused, unable to redirect due cognitive status, but not agitated.   Bed alarm on for safety. Up to the bathroom with A-1 for steadiness and walker, requires simple, constant directions.

## 2018-01-15 NOTE — PLAN OF CARE
Problem: Patient Care Overview  Goal: Plan of Care/Patient Progress Review  OT/PT: CANCEL- Per discussion in rounds No IP therapy needs identified at this time.

## 2018-01-15 NOTE — PROGRESS NOTES
"[facility] [place]    Patient admitted to room 2301 at approximately 1750 via cart from emergency room. Patient was accompanied by transport tech.     Verbal SBAR report received from Crys prior to patient arrival.     Patient ambulated to bed with one assist. Patient not orientated, confused and \"lost her family\". The patient is not having any pain.  . Admission vital signs: Blood pressure 150/68, pulse 79, temperature 97.8  F (36.6  C), temperature source Axillary, resp. rate 18, SpO2 95 %, not currently breastfeeding. Patient was oriented to plan of care, call light, bed controls, tv, telephone, bathroom and visiting hours.     The following safety risks were identified during admission: fall. Yellow risk band applied: MAXIMINO.     Deyanira Elena    "

## 2018-01-15 NOTE — PLAN OF CARE
Problem: Patient Care Overview  Goal: Plan of Care/Patient Progress Review  PT/OT: CANCEL- Per discussion in rounds, no inpatient therapy needs identified at this time

## 2018-01-15 NOTE — PHARMACY - DISCHARGE MEDICATION RECONCILIATION
Discharge medication review for this patient is complete. Pharmacist assisted with medication reconciliation of discharge medications with prior to admission medications.     The following changes were made to the discharge medication list based on pharmacist review:  Added:  Risperidone  Discontinued: Lorazepam, Doxylamine        Patient's Discharge Medication List  - medications as listed on After Visit Summary (AVS)     Review of your medicines      START taking       Dose / Directions    risperiDONE 0.25 MG tablet   Commonly known as:  risperDAL   Used for:  History of dementia        Dose:  0.25 mg   Take 1 tablet (0.25 mg) by mouth At Bedtime   Refills:  0         CONTINUE these medicines which have NOT CHANGED       Dose / Directions    ASPIRIN CAPS 81 MG OR        1 TABLET DAILY   Refills:  0       CRANBERRY PO        Dose:  300 mg   Take 300 mg by mouth 3 times daily   Refills:  0       donepezil 10 MG tablet   Commonly known as:  ARICEPT   Used for:  Mild dementia        Dose:  10 mg   Take 1 tablet (10 mg) by mouth At Bedtime   Quantity:  30 tablet   Refills:  0       ENSURE Liqd        Take by mouth as needed   Refills:  0       hydrochlorothiazide 25 MG tablet   Commonly known as:  HYDRODIURIL   Used for:  Edema, unspecified type        Dose:  25 mg   Take 1 tablet (25 mg) by mouth daily (Needs follow-up appointment for this medication)   Quantity:  30 tablet   Refills:  0       loratadine 10 MG tablet   Commonly known as:  CLARITIN        Dose:  10 mg   Take 10 mg by mouth daily   Refills:  0       losartan 25 MG tablet   Commonly known as:  COZAAR   Used for:  HTN, goal below 150/90        Dose:  25 mg   Take 1 tablet (25 mg) by mouth daily (Needs follow-up appointment for this medication)   Quantity:  30 tablet   Refills:  0       MELATONIN PO        Dose:  10 mg   Take 10 mg by mouth At Bedtime   Refills:  0       sertraline 50 MG tablet   Commonly known as:  ZOLOFT   Used for:  Anxiety        Dose:   50 mg   Take 1 tablet (50 mg) by mouth daily (Needs follow-up appointment for this medication)   Quantity:  30 tablet   Refills:  0         STOP taking          doxylamine 25 MG Tabs tablet   Commonly known as:  UNISOM           LORazepam 0.5 MG tablet   Commonly known as:  ATIVAN

## 2018-01-15 NOTE — CONSULTS
Care Transition Initial Assessment - RN  Reason For Consult: discharge planning   Spoke with daughterBear per phone.    DATA   Active Problems:    HTN, goal below 150/90    Dementia       Primary Care Clinic Name: [facility]  Primary Care MD Name: Dr. Cool  Contact information and PCP information verified: Yes    ASSESSMENT  Cognitive Status: awake, alert and disoriented.       Resources List: Skilled Nursing Facility     Lives With: child(andrez), adult  Living Arrangements: condominium     Description of Support System: Supportive, Involved   Who is your support system?: Children   Support Assessment: Adequate family and caregiver support, Caregiver difficulty providing physical care/safety   Insurance Concerns: No Insurance issues identified      This writer spoke with daughterBear per phone introduced self and role.  Discussed discharge planning and Medicare guidelines in regards to home care, TCU and LTC.  The patient lives with her daughter and son. Her daughter reports she was with her at 4:00 am and the patient's son got home at 7:00 am and found her laying on her stomach on the floor of her bedroom.  Daughter stated, she is unable to safely care for patient. Discussed home care and LTC.  Daughter was provided with Medicare certified nursing home list. Choices are as follows:     [facility] (Main: [phone] Admissions: [phone] Fax: [phone]) - Pending   [facility] (Phone: [phone]) Fax: ([phone]) - Pending   [facility] Phone: ([phone]) Fax: ([phone]) - No bed available.    [facility] (Phone: [phone] Fax: [phone]) -  Pending   [facility] (Admissions Phone: [phone] Main Phone: [phone] Fax: [phone]) -  Pending    Nursing home referrals are pending.  Discussed financing for LTC and the need to self pay.  Referral made to [facility] regarding MA.  Care Transitions  will assist with discharge planning.    PLAN    LTC MCU      Discharge Planner   Discharge Plans in progress: LTC MCU  Barriers to discharge plan: Bed availability  Follow up plan: Referral to clinic care coordinator       Entered by: Berna Martino 01/15/2018 11:32 AM           Berna Martino RN, Care Coordinator [phone]

## 2018-01-15 NOTE — TELEPHONE ENCOUNTER
Pt is in the hospital after having a fall. Recommended to the daughter that she speak with the Dr.s and RN's @ the hospital as to how pt was and how she is now and what meds work and what doesn't. Pt is on Risperdal and Aricept and the Ativan was dc'd. Pt to schedule an appt when dc'd from the hospital. KPaKulwinder

## 2018-01-16 PROBLEM — R45.1 AGITATION: Status: ACTIVE | Noted: 2018-01-16

## 2018-01-16 PROCEDURE — 25000132 ZZH RX MED GY IP 250 OP 250 PS 637: Mod: GY | Performed by: FAMILY MEDICINE

## 2018-01-16 PROCEDURE — A9270 NON-COVERED ITEM OR SERVICE: HCPCS | Mod: GY | Performed by: FAMILY MEDICINE

## 2018-01-16 PROCEDURE — 12000000 ZZH R&B MED SURG/OB

## 2018-01-16 PROCEDURE — 25000132 ZZH RX MED GY IP 250 OP 250 PS 637: Mod: GY | Performed by: INTERNAL MEDICINE

## 2018-01-16 PROCEDURE — 99232 SBSQ HOSP IP/OBS MODERATE 35: CPT | Performed by: FAMILY MEDICINE

## 2018-01-16 PROCEDURE — A9270 NON-COVERED ITEM OR SERVICE: HCPCS | Mod: GY | Performed by: EMERGENCY MEDICINE

## 2018-01-16 PROCEDURE — 25000132 ZZH RX MED GY IP 250 OP 250 PS 637: Mod: GY | Performed by: EMERGENCY MEDICINE

## 2018-01-16 PROCEDURE — G0378 HOSPITAL OBSERVATION PER HR: HCPCS

## 2018-01-16 PROCEDURE — A9270 NON-COVERED ITEM OR SERVICE: HCPCS | Mod: GY | Performed by: INTERNAL MEDICINE

## 2018-01-16 PROCEDURE — 99207 ZZC CDG-MDM COMPONENT: MEETS LOW - DOWN CODED: CPT | Performed by: FAMILY MEDICINE

## 2018-01-16 RX ORDER — HYDROCHLOROTHIAZIDE 25 MG/1
25 TABLET ORAL DAILY
Status: DISCONTINUED | OUTPATIENT
Start: 2018-01-16 | End: 2018-01-17 | Stop reason: HOSPADM

## 2018-01-16 RX ORDER — LORAZEPAM 0.5 MG/1
0.5 TABLET ORAL EVERY 6 HOURS PRN
Status: DISCONTINUED | OUTPATIENT
Start: 2018-01-16 | End: 2018-01-17 | Stop reason: HOSPADM

## 2018-01-16 RX ORDER — QUETIAPINE FUMARATE 25 MG/1
50 TABLET, FILM COATED ORAL AT BEDTIME
Status: DISCONTINUED | OUTPATIENT
Start: 2018-01-16 | End: 2018-01-17 | Stop reason: HOSPADM

## 2018-01-16 RX ORDER — QUETIAPINE FUMARATE 25 MG/1
50 TABLET, FILM COATED ORAL 2 TIMES DAILY
Status: DISCONTINUED | OUTPATIENT
Start: 2018-01-16 | End: 2018-01-16

## 2018-01-16 RX ADMIN — Medication 12.5 MG: at 09:45

## 2018-01-16 RX ADMIN — HYDROCHLOROTHIAZIDE 25 MG: 25 TABLET ORAL at 09:19

## 2018-01-16 RX ADMIN — Medication 1 CAN: at 21:28

## 2018-01-16 RX ADMIN — LORAZEPAM 0.5 MG: 0.5 TABLET ORAL at 13:48

## 2018-01-16 RX ADMIN — Medication 1 CAN: at 08:02

## 2018-01-16 RX ADMIN — DONEPEZIL HYDROCHLORIDE 10 MG: 10 TABLET, FILM COATED ORAL at 21:28

## 2018-01-16 RX ADMIN — Medication 10 MG: at 21:28

## 2018-01-16 RX ADMIN — Medication 1 CAN: at 13:47

## 2018-01-16 RX ADMIN — QUETIAPINE 50 MG: 25 TABLET ORAL at 21:28

## 2018-01-16 RX ADMIN — SERTRALINE HYDROCHLORIDE 50 MG: 50 TABLET ORAL at 08:02

## 2018-01-16 RX ADMIN — ACETAMINOPHEN 650 MG: 325 TABLET, FILM COATED ORAL at 13:47

## 2018-01-16 RX ADMIN — LOSARTAN POTASSIUM 25 MG: 25 TABLET, FILM COATED ORAL at 08:02

## 2018-01-16 NOTE — PLAN OF CARE
Problem: Confusion, Chronic (Adult)  Goal: Cognitive/Functional Impairments Minimized  Patient will demonstrate the desired outcomes by discharge/transition of care.   Outcome: No Change  Awake on and off. Mood and behavior vary and at times pt will be argumentative and opposite of what is suggested. Pt will voices she doesn't understand what is going on and asks why she in trouble and placed here.   Other times, pt will respond to staff showing her abrasions to knees and explaining to pt she has been falling at home and can not be up without staff assistance and will be cooperative.

## 2018-01-16 NOTE — PLAN OF CARE
Problem: Patient Care Overview  Goal: Plan of Care/Patient Progress Review  Outcome: No Change  Pt very confused, has been cooperative and compliant with cares.  Noted increased anxiety and restlessness after lunch, multiple questions about family.  Received oral Ativan, will monitor.

## 2018-01-16 NOTE — PROGRESS NOTES
Very little supper intake, did take in ice cream for supper.   At HS, took in 1/2 of Ensure mixed with ice cream.   Continues to be confused and argument sarah at times, requesting to speak to  and needs to leave, able to redirect back to bed at this time.

## 2018-01-17 VITALS
RESPIRATION RATE: 16 BRPM | SYSTOLIC BLOOD PRESSURE: 121 MMHG | OXYGEN SATURATION: 92 % | HEART RATE: 89 BPM | DIASTOLIC BLOOD PRESSURE: 60 MMHG | TEMPERATURE: 97.4 F

## 2018-01-17 PROCEDURE — 25000132 ZZH RX MED GY IP 250 OP 250 PS 637: Mod: GY | Performed by: INTERNAL MEDICINE

## 2018-01-17 PROCEDURE — 99239 HOSP IP/OBS DSCHRG MGMT >30: CPT | Performed by: FAMILY MEDICINE

## 2018-01-17 PROCEDURE — A9270 NON-COVERED ITEM OR SERVICE: HCPCS | Mod: GY | Performed by: INTERNAL MEDICINE

## 2018-01-17 PROCEDURE — A9270 NON-COVERED ITEM OR SERVICE: HCPCS | Mod: GY | Performed by: FAMILY MEDICINE

## 2018-01-17 PROCEDURE — A9270 NON-COVERED ITEM OR SERVICE: HCPCS | Mod: GY | Performed by: EMERGENCY MEDICINE

## 2018-01-17 PROCEDURE — 25000132 ZZH RX MED GY IP 250 OP 250 PS 637: Mod: GY | Performed by: EMERGENCY MEDICINE

## 2018-01-17 PROCEDURE — 25000132 ZZH RX MED GY IP 250 OP 250 PS 637: Mod: GY | Performed by: FAMILY MEDICINE

## 2018-01-17 RX ORDER — LORAZEPAM 0.5 MG/1
0.5 TABLET ORAL EVERY 6 HOURS PRN
Qty: 15 TABLET | Refills: 0 | Status: SHIPPED | OUTPATIENT
Start: 2018-01-17 | End: 2018-01-23

## 2018-01-17 RX ORDER — QUETIAPINE FUMARATE 25 MG/1
12.5 TABLET, FILM COATED ORAL
Qty: 60 TABLET | COMMUNITY
Start: 2018-01-18 | End: 2018-03-11

## 2018-01-17 RX ORDER — LORAZEPAM 0.5 MG/1
0.5 TABLET ORAL EVERY 6 HOURS PRN
Qty: 15 TABLET | Refills: 0 | Status: SHIPPED | OUTPATIENT
Start: 2018-01-17 | End: 2018-01-17

## 2018-01-17 RX ORDER — QUETIAPINE FUMARATE 50 MG/1
50 TABLET, FILM COATED ORAL AT BEDTIME
Qty: 60 TABLET | COMMUNITY
Start: 2018-01-17 | End: 2018-03-11

## 2018-01-17 RX ORDER — QUETIAPINE FUMARATE 25 MG/1
25 TABLET, FILM COATED ORAL
Status: DISCONTINUED | OUTPATIENT
Start: 2018-01-18 | End: 2018-01-17 | Stop reason: HOSPADM

## 2018-01-17 RX ADMIN — Medication 1 CAN: at 13:36

## 2018-01-17 RX ADMIN — ACETAMINOPHEN 650 MG: 325 TABLET, FILM COATED ORAL at 07:53

## 2018-01-17 RX ADMIN — Medication 12.5 MG: at 10:12

## 2018-01-17 RX ADMIN — Medication 1 CAN: at 07:58

## 2018-01-17 RX ADMIN — Medication 12.5 MG: at 07:52

## 2018-01-17 RX ADMIN — HYDROCHLOROTHIAZIDE 25 MG: 25 TABLET ORAL at 07:52

## 2018-01-17 RX ADMIN — LOSARTAN POTASSIUM 25 MG: 25 TABLET, FILM COATED ORAL at 07:52

## 2018-01-17 RX ADMIN — SERTRALINE HYDROCHLORIDE 50 MG: 50 TABLET ORAL at 07:52

## 2018-01-17 NOTE — PLAN OF CARE
Problem: Patient Care Overview  Goal: Plan of Care/Patient Progress Review  Outcome: Adequate for Discharge Date Met: 01/17/18  ANKIT BAUTISTA DISCHARGE NOTE    Patient discharged to long term care facility at 3:18 PM via wheel chair. Accompanied by daughter and staff. Discharge instructions reviewed with daughter, opportunity offered to ask questions. Prescriptions sent to patients preferred pharmacy. All belongings sent with patient.    Silvana Up

## 2018-01-17 NOTE — PROGRESS NOTES
Pt has been up to bathroom assist 1 with walker. Needs constant ques and directions. Is pleasant and cooperative. Awaiting placement. Family is at bedside. /77  Pulse 81  Temp 99  F (37.2  C) (Oral)  Resp 18  SpO2 92%

## 2018-01-17 NOTE — PHARMACY - DISCHARGE MEDICATION RECONCILIATION
Discharge medication review for this patient is complete. Pharmacist assisted with medication reconciliation of discharge medications with prior to admission medications.     The following changes were made to the discharge medication list based on pharmacist review:  Added:  Quetiapine and Risperidone  Discontinued: Doxylamine        Patient's Discharge Medication List  - medications as listed on After Visit Summary (AVS)     Review of your medicines      START taking       Dose / Directions    * QUEtiapine 50 MG tablet   Commonly known as:  SEROquel        Dose:  50 mg   Take 1 tablet (50 mg) by mouth At Bedtime   Quantity:  60 tablet   Refills:  0       * QUEtiapine 25 MG tablet   Commonly known as:  SEROquel        Dose:  25 mg   Start taking on:  1/18/2018   Take 1 tablet (25 mg) by mouth daily (with breakfast)   Quantity:  60 tablet   Refills:  0       risperiDONE 0.25 MG tablet   Commonly known as:  risperDAL   Used for:  History of dementia        Dose:  0.25 mg   Take 1 tablet (0.25 mg) by mouth At Bedtime   Refills:  0       * Notice:  This list has 2 medication(s) that are the same as other medications prescribed for you. Read the directions carefully, and ask your doctor or other care provider to review them with you.      CONTINUE these medicines which may have CHANGED, or have new prescriptions. If we are uncertain of the size of tablets/capsules you have at home, strength may be listed as something that might have changed.       Dose / Directions    LORazepam 0.5 MG tablet   Commonly known as:  ATIVAN   This may have changed:  when to take this   Used for:  History of dementia        Dose:  0.5 mg   Take 1 tablet (0.5 mg) by mouth every 6 hours as needed for anxiety   Quantity:  15 tablet   Refills:  0         CONTINUE these medicines which have NOT CHANGED       Dose / Directions    ASPIRIN CAPS 81 MG OR        1 TABLET DAILY   Refills:  0       CRANBERRY PO        Dose:  300 mg   Take 300 mg by  mouth 3 times daily   Refills:  0       donepezil 10 MG tablet   Commonly known as:  ARICEPT   Used for:  Mild dementia        Dose:  10 mg   Take 1 tablet (10 mg) by mouth At Bedtime   Quantity:  30 tablet   Refills:  0       ENSURE Liqd        Take by mouth as needed   Refills:  0       hydrochlorothiazide 25 MG tablet   Commonly known as:  HYDRODIURIL   Used for:  Edema, unspecified type        Dose:  25 mg   Take 1 tablet (25 mg) by mouth daily (Needs follow-up appointment for this medication)   Quantity:  30 tablet   Refills:  0       loratadine 10 MG tablet   Commonly known as:  CLARITIN        Dose:  10 mg   Take 10 mg by mouth daily   Refills:  0       losartan 25 MG tablet   Commonly known as:  COZAAR   Used for:  HTN, goal below 150/90        Dose:  25 mg   Take 1 tablet (25 mg) by mouth daily (Needs follow-up appointment for this medication)   Quantity:  30 tablet   Refills:  0       MELATONIN PO        Dose:  10 mg   Take 10 mg by mouth At Bedtime   Refills:  0       sertraline 50 MG tablet   Commonly known as:  ZOLOFT   Used for:  Anxiety        Dose:  50 mg   Take 1 tablet (50 mg) by mouth daily (Needs follow-up appointment for this medication)   Quantity:  30 tablet   Refills:  0         STOP taking          doxylamine 25 MG Tabs tablet   Commonly known as:  UNISOM                Where to get your medicines      Some of these will need a paper prescription and others can be bought over the counter. Ask your nurse if you have questions.     Bring a paper prescription for each of these medications      LORazepam 0.5 MG tablet

## 2018-01-17 NOTE — PROGRESS NOTES
[facility]      Subjective:  Per rn -has not needed sitter  Slept well  Ate better this am then previous  Mildly anxious before bedtime and again this am    Review of Systems:  unable    Physical Exam:  Vitals Were Reviewed    Patient Vitals for the past 16 hrs:   BP Temp Temp src Pulse Heart Rate Resp SpO2   01/17/18 0723 122/58 97.6  F (36.4  C) Oral 94 94 16 93 %   01/16/18 2326 140/71 98.1  F (36.7  C) Oral 103 - 18 91 %   01/16/18 1936 157/75 97.1  F (36.2  C) Oral - 110 18 94 %         Intake/Output Summary (Last 24 hours) at 01/17/18 0923  Last data filed at 01/17/18 0849   Gross per 24 hour   Intake             1170 ml   Output              150 ml   Net             1020 ml       GENERAL APPEARANCE: healthy, alert and no distress  RESP: lungs clear to auscultation - no rales, rhonchi or wheezes  CV: regular rate and rhythm, normal S1 S2, no S3 or S4 and no murmur, click or rub   ABDOMEN: soft, nontender, no HSM or masses and bowel sounds normal  MS: no clubbing, cyanosis; no edema  SKIN: clear without significant rashes or lesions  NEURO: currently calm , confused    Lab:  Recent Labs   Lab Test  01/14/18   1343  09/03/15   1659   NA  140  137   POTASSIUM  3.6  3.6   CHLORIDE  106  100   CO2  24  30   ANIONGAP  10  7   GLC  142*  86   BUN  17  16   CR  0.82  0.65   ZACK  8.6  9.3     CBC RESULTS:   Recent Labs   Lab Test  01/14/18   1343  08/26/13   1841   WBC  8.2  7.7   RBC  3.79*  4.21   HGB  11.7  12.8   HCT  35.3  39.3   PLT  226  239       No results found for this or any previous visit (from the past 24 hour(s)).    Assessment and Plan:    Sherri Bender is a 85 year old female with hx dementia who presents with progressive confusion and increased caretaker needs over past 1-2 months      Dementia - progression to the point of family feeling like she needs placement. Some behavior problems gwen at night. Started on respirdal.. Cont aricept. Holding ativan. Holding  Unasom.  Needed iv haldol on 1/16--better now with seroquel      Fall- presumed mechanical, based on past hx of witnessed fall when attempting to walk unassisted- abrasions to face ( right cheek) and bilat knee- tylenol and ice prn.ck pending      HTN- cont cozaar , hctz      Depression- cont zoloft      Urinary incontinence      Chronic rhinnorhea, hx environmental alllergies- has been on claritin. Will hold for now in case claritin increasing confusion      FEN- has had poor po intakeper daughter- will start ensure with meals. Has been on one ensure a day at home      Discussion- long discussion with daughter 1/16/18 , she has concern of Lewy Body ds. Discussed with pharm. using seroquel during day and larger dose at night. Ativan prn. Neither antipsychotic or benzo's are optimum but may be necessary at least in short term. hope to send to [facility] today. Will increase AM seroquel dose to 25.

## 2018-01-17 NOTE — PLAN OF CARE
Problem: Patient Care Overview  Goal: Plan of Care/Patient Progress Review  Outcome: No Change  Pt is alert but confused, compliant with cares and has been redirectable.  Appetite improving, awaiting placement.

## 2018-01-17 NOTE — DISCHARGE SUMMARY
HISTORY OF PRESENT ILLNESS:  The patient Sherri Bender is an 85-year-old female with worsening dementia who presented with progressive confusion and a fall at home.  She is cared for by her daughter who lives with her in their home.  The patient's daughter notes that Sherri has declined over the last 1-2 months with confusion, weakness and increased difficulty with her activities of daily living.  She has had agitation at night.  She was found down on the floor at 0700 hrs the day of admission.  Her daughter had last seen her at 0400 hrs.  The patient's daughter feels that she can no longer safely take care of Sherri in their home.       Upon admission the patient had a CT scan of her head and neck which showed no acute problems.  An  x-ray of her pelvis was negative for fracture.  She was admitted overnight.  Dr. Blue stopped her Unisom and Ativan and started Risperdal.  I ultimately stopped the Resperdal and started seroquel with twice daily dosing. This seemed to control some agitated behaviors that the patient was having. I talked to her daughter at length-she has some concern for Lewy Body Dementia-but the pt doesn't have this as a formal diagnosis. At this point I would prefer to use no antipsychotics or benzos-but we needed to use something to control anxiety/agitation. I will allow her to use ativan prn for anxiety which was being used at bedtime at home for agitation. A formal leland-psych consult could be considered if she is having behavioral problems.      ASSESSMENT:   Worsening dementia apparently Alzheimer variety/some agitation and anxiety   Mechanical fall with abrasions of both knees.   Hypertension.   Depression.   Urinary incontinence.   Chronic rhinorrhea.   History of environmental allergies.       PLAN:     1.  The patient will be discharged to a long-term care facility when a bed becomes available.   2.  She will continue taking Seroquel that was started during this hospitalization  3.   Ativan can be continued as a prn med.     4.  We will continue Aricept, hydrochlorothiazide, Losartan, Claritin, aspirin and sertraline.     5.  She should have Primary Care follow-up within 10 days-a leland psych consult could be considered.     Current Discharge Medication List      START taking these medications    Details   !! QUEtiapine (SEROQUEL) 50 MG tablet Take 1 tablet (50 mg) by mouth At Bedtime  Qty: 60 tablet      !! QUEtiapine (SEROQUEL) 25 MG tablet Take 1 tablet (25 mg) by mouth daily (with breakfast)  Qty: 60 tablet      risperiDONE (RISPERDAL) 0.25 MG tablet Take 1 tablet (0.25 mg) by mouth At Bedtime    Associated Diagnoses: History of dementia       !! - Potential duplicate medications found. Please discuss with provider.      CONTINUE these medications which have CHANGED    Details   LORazepam (ATIVAN) 0.5 MG tablet Take 1 tablet (0.5 mg) by mouth every 6 hours as needed for anxiety  Qty: 15 tablet, Refills: 0    Associated Diagnoses: History of dementia         CONTINUE these medications which have NOT CHANGED    Details   CRANBERRY PO Take 300 mg by mouth 3 times daily      MELATONIN PO Take 10 mg by mouth At Bedtime      loratadine (CLARITIN) 10 MG tablet Take 10 mg by mouth daily      Nutritional Supplements (ENSURE) LIQD Take by mouth as needed      donepezil (ARICEPT) 10 MG tablet Take 1 tablet (10 mg) by mouth At Bedtime  Qty: 30 tablet, Refills: 0    Associated Diagnoses: Mild dementia      losartan (COZAAR) 25 MG tablet Take 1 tablet (25 mg) by mouth daily (Needs follow-up appointment for this medication)  Qty: 30 tablet, Refills: 0    Associated Diagnoses: HTN, goal below 150/90      hydrochlorothiazide (HYDRODIURIL) 25 MG tablet Take 1 tablet (25 mg) by mouth daily (Needs follow-up appointment for this medication)  Qty: 30 tablet, Refills: 0    Associated Diagnoses: Edema, unspecified type      sertraline (ZOLOFT) 50 MG tablet Take 1 tablet (50 mg) by mouth daily (Needs follow-up  appointment for this medication)  Qty: 30 tablet, Refills: 0    Associated Diagnoses: Anxiety      ASPIRIN CAPS 81 MG OR 1 TABLET DAILY         STOP taking these medications       doxylamine (UNISOM) 25 MG TABS tablet Comments:   Reason for Stopping:             Unresulted Labs Ordered in the Past 30 Days of this Admission     No orders found from 11/15/2017 to 1/15/2018.

## 2018-01-17 NOTE — PLAN OF CARE
Problem: Confusion, Chronic (Adult)  Goal: Identify Related Risk Factors and Signs and Symptoms  Related risk factors and signs and symptoms are identified upon initiation of Human Response Clinical Practice Guideline (CPG).   Outcome: No Change  Continues to be confused, oriented to self only. Pleasant and cooperative last latrell into the night. Did requires frequent redirection due cognitive status and inability to hold onto information told to her, but no negative behaviors latrell or night.   Pt up in wc and sitting by nurse station from ~[phone]. Pt took in Ensure that was mixed with ice cream.  Pt assisted into bed around 2330 and did need redirection with a few attempts to get out of bed. Bed alarm on for safety. Frequent visual checks.

## 2018-01-17 NOTE — PROGRESS NOTES
Transition Communication Hand-off for Care Transitions to Next Level of Care Provider/Discharge:    Name: Sherri Bender  MRN #: [id number]  Primary Care Provider: Kameron Cool  Primary Care MD Name: Dr. Cool  Primary Clinic: [address] [facility] [address]  Primary Care Clinic Name: [facility]  Reason for Hospitalization:  Falls frequently [R29.6]  Fall, initial encounter [W19.XXXA]  Contusion, cheek, initial encounter [S00.83XA]  History of dementia [Z86.59]  Admit Date/Time: 1/14/2018  1:25 PM  Discharge Date: 1/17/18  Payor Source: Payor: MEDICA / Plan: MEDICA PRIME SOLUTION / Product Type: Indemnity /     Readmission Assessment Measure (NEV[place]LE) Risk Score/category: Average        Reason for Communication Hand-off Referral:  Patient is going to a LTC [facility].    Discharge Plan:  [facility] (Phone: [phone] Fax: [phone]).       Concern for non-adherence with plan of care:   Y/N No  Discharge Needs Assessment:  Needs       Most Recent Value    [facility]    PAS Number [id number]        PAS-RR:  Per DHS regulation, CTS team completed and submitted PAS-RR to [place] Board on Aging Direct Connect via the [employer] [facility]. CTS team advised SNF and they are aware a PAS-RR has been submitted.   CTS team reviewed with pt or health care agent that they may be contacted for a follow up appointment within 10 days of hospital discharge if SNF stay is <30 days. Contact information for [employer] was also provided.   Pt or health care agent verbalized understanding.   PAS-RR # [id number]        Berna Martino RN, Care Coordinator

## 2018-01-22 VITALS
RESPIRATION RATE: 16 BRPM | SYSTOLIC BLOOD PRESSURE: 103 MMHG | OXYGEN SATURATION: 90 % | WEIGHT: 166.8 LBS | BODY MASS INDEX: 29.31 KG/M2 | DIASTOLIC BLOOD PRESSURE: 43 MMHG | TEMPERATURE: 99.1 F | HEART RATE: 97 BPM

## 2018-01-22 NOTE — PROGRESS NOTES
[facility]  PRIMARY CARE PROVIDER AND CLINIC:  Kameron Cool. [address] [facility] [address]  Chief Complaint   Patient presents with     Clinic Care Coordination - Initial     HPI:    Sherri Bender is a 85 year old  (6/6/1932),admitted to the [facility] from [facility].  Hospital stay 1/14/18  through 1/17/18 for acute confusion and falls.  Admitted to this facility for  rehab, medical management, nursing care and long term care.  HPI information obtained from: facility chart records, facility staff, patient report, [facility] chart review and Care Everywhere [facility] chart review.  Current issues are:       Prior to admission: worsening dementia, presented to hospital w/ progressive confusion and a fall at home.  She was cared for by her daughter who lived with her in their home. The patient's daughter felt that she could no longer safely take care of Sherri in their home.  She underwent head CT, XRs (both negative for trauma), was started on Risperidone, Seroquel, and recommended leland-psych d/t agitation while inpatient.    Dementia with behavioral disturbance, unspecified dementia type and Anxiety  Patient presents confused, alert, asking orientation questions due to confusion on place, time, situation. Stated numerous times that she was worried about her children, worried about where she was, and obviously distressed about her situation. Has fallen several times (non-traumatic) trying to get out-of-bed or orientate herself. No violent/combative behavior. Patient is currently taking:    Seroquel 25mg QAM.    Seroquel 50mg QPM.    Risperidol 0.25mg QHS.    Aricept 10mg QD.    Ativan 0.5mg Q6h PRN    HTN, goal below 150/90  Chart review shows marginal BPs in the low 100's systolic, which could also be contributory to frequent falls. Current BP medications are:    Losartan 25mg QD.    Hctz 25mg QD.    CODE STATUS/ADVANCE DIRECTIVES  DISCUSSION:   DNR / DNI  Patient's living condition: live with daughter who is her caregiver    ALLERGIES:Nkda [no known drug allergies]  PAST MEDICAL HISTORY:  has a past medical history of Dementia; Depression; Environmental allergies; HTN (hypertension); and Osteoarthritis.  PAST SURGICAL HISTORY:  has a past surgical history that includes appendectomy; surgical history of - ; surgical history of - ; tonsillectomy; cataract iol, rt/lt (4/2010); and Colonoscopy (6/20/2011).  FAMILY HISTORY: family history includes Allergies in her brother; CANCER in her brother and mother; HEART DISEASE in her father and mother.  SOCIAL HISTORY:  reports that she has quit smoking. She has never used smokeless tobacco. She reports that she does not drink alcohol or use illicit drugs.    Post Discharge Medication Reconciliation Status: discharge medications reconciled and changed, per note/orders (see AVS).  Current Outpatient Prescriptions   Medication Sig Dispense Refill     hydrochlorothiazide (MICROZIDE) 12.5 MG capsule Take 12.5 mg by mouth daily       Acetaminophen (TYLENOL PO) Take 1,000 mg by mouth 3 times daily       Donepezil HCl (ARICEPT PO) Take 5 mg by mouth At Bedtime       Sertraline HCl (ZOLOFT PO) Take by mouth daily 75mg PO daily       TRAZODONE HCL PO Take 25 mg by mouth 2 times daily as needed for sleep       QUEtiapine (SEROQUEL) 50 MG tablet Take 1 tablet (50 mg) by mouth At Bedtime 60 tablet      QUEtiapine (SEROQUEL) 25 MG tablet Take 12.5 mg by mouth daily (with breakfast)  60 tablet      CRANBERRY PO Take 300 mg by mouth 3 times daily       MELATONIN PO Take 10 mg by mouth At Bedtime       loratadine (CLARITIN) 10 MG tablet Take 10 mg by mouth daily       losartan (COZAAR) 25 MG tablet Take 1 tablet (25 mg) by mouth daily (Needs follow-up appointment for this medication) 30 tablet 0     ASPIRIN CAPS 81 MG OR 1 TABLET DAILY         ROS:  Unobtainable secondary to cognitive impairment or aphasia, but today pt  reports that she is worried and was repetitive with these statements.     Exam:  /43  Pulse 97  Temp 99.1  F (37.3  C)  Resp 16  Wt 166 lb 12.8 oz (75.7 kg)  SpO2 90%  BMI 29.31 kg/m2  GENERAL APPEARANCE:  Alert, anxious, cooperative  EYES:  EOM, conjunctivae, lids, pupils and irises normal, PERRL  RESP:  respiratory effort and palpation of chest normal, lungs clear to auscultation , no respiratory distress  CV:  Palpation and auscultation of heart done , regular rate and rhythm, no murmur, rub, or gallop, no edema, +2 pedal pulses  ABDOMEN:  normal bowel sounds, soft, nontender, no hepatosplenomegaly or other masses, no guarding or rebound  M/S:   Digits and nails normal, sitting upright in w/c.  SKIN:  Inspection of skin and subcutaneous tissue baseline, Palpation of skin and subcutaneous tissue baseline  NEURO:   Cranial nerves 2-12 are normal tested and grossly at patient's baseline, Examination of sensation by touch normal for what could be elicited/assessed.  PSYCH:  oriented to self only, insight and judgement impaired, memory impaired, anxious, thought content scattered/tangential.    Lab/Diagnostic data:  CBC RESULTS:   Recent Labs   Lab Test  01/14/18   1343  08/26/13   1841   WBC  8.2  7.7   RBC  3.79*  4.21   HGB  11.7  12.8   HCT  35.3  39.3   MCV  93  93   MCH  30.9  30.4   MCHC  33.1  32.6   RDW  13.0  13.2   PLT  226  239     Last Basic Metabolic Panel:  Recent Labs   Lab Test  01/14/18   1343  09/03/15   1659   NA  140  137   POTASSIUM  3.6  3.6   CHLORIDE  106  100   ZACK  8.6  9.3   CO2  24  30   BUN  17  16   CR  0.82  0.65   GLC  142*  86     Liver Function Studies -   Recent Labs   Lab Test  01/14/18   1343  09/03/15   1659   PROTTOTAL  6.6*  7.5   ALBUMIN  3.3*  3.9   BILITOTAL  0.3  0.4   ALKPHOS  67  63   AST  18  17   ALT  19  16     TSH   Date Value Ref Range Status   09/03/2015 1.79 0.40 - 4.00 mU/L Final   02/21/2014 2.09 0.4 - 5.0 mU/L Final     Lab Results   Component Value  "Date    A1C 5.8 02/16/2012    A1C 5.9 07/19/2011     ASSESSMENT/PLAN:    Dementia with behavioral disturbance, unspecified dementia type and Anxiety  Chronic. Stable. Progressing. Suspect benzos/antipsychotics are contributing to anxiety, place patient at risk for falls, and are covering up BPSD.  Will pursue BPSD management through control of pain (Tylenol), better anxiety control (Zoloft), and we will also check a TSH as the last was done in 2015.  Discontinue/decrease antipsychotics/benzo as noted below. Aricept might be \"activating\" for her and try dose reduction. Use Trazodone prn to help comfort agitation  Will follow-up in 1-2 weeks.       HTN, goal below 150/90  Chronic. Stable. Well below target goal and hypotension at her age may be contributing to falls. Will decrease Hctz and follow-up in 1-2 weeks to consider further GDR.     Orders:  1.  Decrease HCTZ to 12.5mg PO daily.  Dx:  HTN  2.  DC Risperidol  3.  Start Tylenol 1000mg PO TID.  DX:  Pain  4.  Decrease Aricept to 5mg PO at HS daily.  Dx:  Dementia  5.  Increase Zoloft to 75mg PO daily.  Dx:  MASON  6.  Change AM Seroquel to 12.5mg PO every AM.  Dx:  Agitation  7.  Trazodone 25mg PO BID PRN .  Dx:  MASON  8.  DC PRN Ativan  9.  Check TSH x1 on Thursday 1/25/18.  Dx:  Dementia    Total time spent with patient visit at the skilled nursing facility was 40 min including patient visit and review of past records. Greater than 50% of total time spent with counseling and coordinating care due to chart review    Electronically signed by:  Genia Titus RN BSN CCRN (NP Student).  This patient was seen along with a nurse practitioner student. The histories and ROS were obtained and confirmed by myself. All objective information and Assessment/Plans were completed by myself.    VALERIE Epps CNP                "

## 2018-01-23 ENCOUNTER — CARE COORDINATION (OUTPATIENT)
Dept: CARE COORDINATION | Facility: CLINIC | Age: 83
End: 2018-01-23

## 2018-01-23 ENCOUNTER — NURSING HOME VISIT (OUTPATIENT)
Dept: GERIATRICS | Facility: CLINIC | Age: 83
End: 2018-01-23
Payer: COMMERCIAL

## 2018-01-23 DIAGNOSIS — F41.9 ANXIETY: ICD-10-CM

## 2018-01-23 DIAGNOSIS — I10 HTN, GOAL BELOW 150/90: ICD-10-CM

## 2018-01-23 DIAGNOSIS — F03.91 DEMENTIA WITH BEHAVIORAL DISTURBANCE, UNSPECIFIED DEMENTIA TYPE: Primary | ICD-10-CM

## 2018-01-23 PROCEDURE — 99310 SBSQ NF CARE HIGH MDM 45: CPT | Performed by: NURSE PRACTITIONER

## 2018-01-23 RX ORDER — HYDROCHLOROTHIAZIDE 12.5 MG/1
12.5 CAPSULE ORAL DAILY
COMMUNITY
End: 2018-06-11

## 2018-01-23 NOTE — LETTER
[place] CARE COORDINATION  [facility]  [address]  [facility] [address]  Phone: [phone]    January 23, 2018    Sherri Bender  [address] [place] [address]      Dear Sherri,    I am a clinic care coordinator who works with Kameron Cool MD at [facility]. I wanted to introduce myself and provide you with my contact information so that you can call me with questions or concerns about your health care. Below is a description of clinic care coordination and how I can further assist you.     The clinic care coordinator is a registered nurse and/or  who understand the health care system. The goal of clinic care coordination is to help you manage your health and improve access to the [facility] system in the most efficient manner. The registered nurse can assist you in meeting your health care goals by providing education, coordinating services, and strengthening the communication among your providers. The  can assist you with financial, behavioral, psychosocial, chemical dependency, counseling, and/or psychiatric resources.    Please feel free to contact me at [phone], [phone], with any questions or concerns. We at [facility] are focused on providing you with the highest-quality healthcare experience possible and that all starts with you.     Sincerely,     Braden Teague RN  Clinic Care Coordinator    Enclosed: I have enclosed a copy of a 24 Hour Access Plan. This has helpful phone numbers for you to call when needed. Please keep this in an easy to access place to use as needed.

## 2018-01-23 NOTE — LETTER
1/23/2018        RE: Sherri Bender  [address] [place] [address]        [place] [facility]  PRIMARY CARE PROVIDER AND CLINIC:  Kameron Cool. [address] [facility] [address]  Chief Complaint   Patient presents with     Clinic Care Coordination - Initial     HPI:    Sherri Bender is a 85 year old  (6/6/1932),admitted to the [facility] from [facility].  Hospital stay 1/14/18  through 1/17/18 for acute confusion and falls.  Admitted to this facility for  rehab, medical management, nursing care and long term care.  HPI information obtained from: facility chart records, facility staff, patient report, [facility] chart review and Care Everywhere [facility] chart review.  Current issues are:       Prior to admission: worsening dementia, presented to hospital w/ progressive confusion and a fall at home.  She was cared for by her daughter who lived with her in their home. The patient's daughter felt that she could no longer safely take care of Sherri in their home.  She underwent head CT, XRs (both negative for trauma), was started on Risperidone, Seroquel, and recommended leland-psych d/t agitation while inpatient.    Dementia with behavioral disturbance, unspecified dementia type and Anxiety  Patient presents confused, alert, asking orientation questions due to confusion on place, time, situation. Stated numerous times that she was worried about her children, worried about where she was, and obviously distressed about her situation. Has fallen several times (non-traumatic) trying to get out-of-bed or orientate herself. No violent/combative behavior. Patient is currently taking:    Seroquel 25mg QAM.    Seroquel 50mg QPM.    Risperidol 0.25mg QHS.    Aricept 10mg QD.    Ativan 0.5mg Q6h PRN    HTN, goal below 150/90  Chart review shows marginal BPs in the low 100's systolic, which could also be contributory to frequent falls. Current  BP medications are:    Losartan 25mg QD.    Hctz 25mg QD.    CODE STATUS/ADVANCE DIRECTIVES DISCUSSION:   DNR / DNI  Patient's living condition: live with daughter who is her caregiver    ALLERGIES:Nkda [no known drug allergies]  PAST MEDICAL HISTORY:  has a past medical history of Dementia; Depression; Environmental allergies; HTN (hypertension); and Osteoarthritis.  PAST SURGICAL HISTORY:  has a past surgical history that includes appendectomy; surgical history of - ; surgical history of - ; tonsillectomy; cataract iol, rt/lt (4/2010); and Colonoscopy (6/20/2011).  FAMILY HISTORY: family history includes Allergies in her brother; CANCER in her brother and mother; HEART DISEASE in her father and mother.  SOCIAL HISTORY:  reports that she has quit smoking. She has never used smokeless tobacco. She reports that she does not drink alcohol or use illicit drugs.    Post Discharge Medication Reconciliation Status: discharge medications reconciled and changed, per note/orders (see AVS).  Current Outpatient Prescriptions   Medication Sig Dispense Refill     hydrochlorothiazide (MICROZIDE) 12.5 MG capsule Take 12.5 mg by mouth daily       Acetaminophen (TYLENOL PO) Take 1,000 mg by mouth 3 times daily       Donepezil HCl (ARICEPT PO) Take 5 mg by mouth At Bedtime       Sertraline HCl (ZOLOFT PO) Take by mouth daily 75mg PO daily       TRAZODONE HCL PO Take 25 mg by mouth 2 times daily as needed for sleep       QUEtiapine (SEROQUEL) 50 MG tablet Take 1 tablet (50 mg) by mouth At Bedtime 60 tablet      QUEtiapine (SEROQUEL) 25 MG tablet Take 12.5 mg by mouth daily (with breakfast)  60 tablet      CRANBERRY PO Take 300 mg by mouth 3 times daily       MELATONIN PO Take 10 mg by mouth At Bedtime       loratadine (CLARITIN) 10 MG tablet Take 10 mg by mouth daily       losartan (COZAAR) 25 MG tablet Take 1 tablet (25 mg) by mouth daily (Needs follow-up appointment for this medication) 30 tablet 0     ASPIRIN CAPS 81 MG OR 1 TABLET  DAILY         ROS:  Unobtainable secondary to cognitive impairment or aphasia, but today pt reports that she is worried and was repetitive with these statements.     Exam:  /43  Pulse 97  Temp 99.1  F (37.3  C)  Resp 16  Wt 166 lb 12.8 oz (75.7 kg)  SpO2 90%  BMI 29.31 kg/m2  GENERAL APPEARANCE:  Alert, anxious, cooperative  EYES:  EOM, conjunctivae, lids, pupils and irises normal, PERRL  RESP:  respiratory effort and palpation of chest normal, lungs clear to auscultation , no respiratory distress  CV:  Palpation and auscultation of heart done , regular rate and rhythm, no murmur, rub, or gallop, no edema, +2 pedal pulses  ABDOMEN:  normal bowel sounds, soft, nontender, no hepatosplenomegaly or other masses, no guarding or rebound  M/S:   Digits and nails normal, sitting upright in w/c.  SKIN:  Inspection of skin and subcutaneous tissue baseline, Palpation of skin and subcutaneous tissue baseline  NEURO:   Cranial nerves 2-12 are normal tested and grossly at patient's baseline, Examination of sensation by touch normal for what could be elicited/assessed.  PSYCH:  oriented to self only, insight and judgement impaired, memory impaired, anxious, thought content scattered/tangential.    Lab/Diagnostic data:  CBC RESULTS:   Recent Labs   Lab Test  01/14/18 1343 08/26/13   1841   WBC  8.2  7.7   RBC  3.79*  4.21   HGB  11.7  12.8   HCT  35.3  39.3   MCV  93  93   MCH  30.9  30.4   MCHC  33.1  32.6   RDW  13.0  13.2   PLT  226  239     Last Basic Metabolic Panel:  Recent Labs   Lab Test  01/14/18   1343  09/03/15   1659   NA  140  137   POTASSIUM  3.6  3.6   CHLORIDE  106  100   ZACK  8.6  9.3   CO2  24  30   BUN  17  16   CR  0.82  0.65   GLC  142*  86     Liver Function Studies -   Recent Labs   Lab Test  01/14/18 1343  09/03/15   1659   PROTTOTAL  6.6*  7.5   ALBUMIN  3.3*  3.9   BILITOTAL  0.3  0.4   ALKPHOS  67  63   AST  18  17   ALT  19  16     TSH   Date Value Ref Range Status   09/03/2015 1.79 0.40  "- 4.00 mU/L Final   02/21/2014 2.09 0.4 - 5.0 mU/L Final     Lab Results   Component Value Date    A1C 5.8 02/16/2012    A1C 5.9 07/19/2011     ASSESSMENT/PLAN:    Dementia with behavioral disturbance, unspecified dementia type and Anxiety  Chronic. Stable. Progressing. Suspect benzos/antipsychotics are contributing to anxiety, place patient at risk for falls, and are covering up BPSD.  Will pursue BPSD management through control of pain (Tylenol), better anxiety control (Zoloft), and we will also check a TSH as the last was done in 2015.  Discontinue/decrease antipsychotics/benzo as noted below. Aricept might be \"activating\" for her and try dose reduction. Use Trazodone prn to help comfort agitation  Will follow-up in 1-2 weeks.       HTN, goal below 150/90  Chronic. Stable. Well below target goal and hypotension at her age may be contributing to falls. Will decrease Hctz and follow-up in 1-2 weeks to consider further GDR.     Orders:  1.  Decrease HCTZ to 12.5mg PO daily.  Dx:  HTN  2.  DC Risperidol  3.  Start Tylenol 1000mg PO TID.  DX:  Pain  4.  Decrease Aricept to 5mg PO at HS daily.  Dx:  Dementia  5.  Increase Zoloft to 75mg PO daily.  Dx:  MASON  6.  Change AM Seroquel to 12.5mg PO every AM.  Dx:  Agitation  7.  Trazodone 25mg PO BID PRN .  Dx:  MASON  8.  DC PRN Ativan  9.  Check TSH x1 on Thursday 1/25/18.  Dx:  Dementia    Total time spent with patient visit at the skilled nursing facility was 40 min including patient visit and review of past records. Greater than 50% of total time spent with counseling and coordinating care due to chart review    Electronically signed by:  Genia Titus RN BSN CCRN (NP Student).  This patient was seen along with a nurse practitioner student. The histories and ROS were obtained and confirmed by myself. All objective information and Assessment/Plans were completed by myself.    Glenis Hagan, APRN CNP                    Sincerely,        Glenis Hagan, " APRN CNP

## 2018-01-23 NOTE — PROGRESS NOTES
Intro letter sent for future follow up if needed.    Braden Teague RN  Clinic Care Coordinator  [phone] or [phone]

## 2018-01-23 NOTE — LETTER
Health Care Home - Access Care Plan    About Me  Patient Name:  Sherri Bender    YOB: 1932  Age:                             85 year old   [place] MRN:            [id number] Telephone Information:     Home Phone [phone]   Mobile Not on file.       Address:    [address] [place] [address] Email address:  [email or web address]      Emergency Contact(s)  Name Relationship Lgl Grd Work Phone Home Phone Mobile Phone   1. TYLER KHAN* Daughter   [phone]   2. CALDERON BENDER* Son   [phone]             Health Maintenance:      My Access Plan  Medical Emergency 911   Questions or concerns during clinic hours Primary Clinic Line, I will call the clinic directly: Primary Clinic: [facility]- [phone]   24 Hour Appointment Line [phone] or  [phone]) (toll free)   24 Hour Nurse Line [phone] (toll free)   Questions or concerns outside clinic hours 24 Hour Appointment Line, I will call the after-hours on-call line:   [facility] [phone] or [phone]) (toll-free)   Preferred Urgent Care Preferred Urgent Care: [facility], [phone]   Preferred Hospital Preferred Hospital: [place], [place]  [phone]   Preferred Pharmacy [facility] [address]     Behavioral Health Crisis Line The National Suicide Prevention Lifeline at 1-613.293.7532 or 911     My Care Team Members  Patient Care Team       Relationship Specialty Notifications Start End    Kameron Cool MD PCP - General Family Practice  9/22/16     Phone: [phone] Fax: [phone] [address] [facility] [address]        My Medical and Care Information  Problem List   Patient Active Problem List   Diagnosis     SENSONRL HEAR LOSS,BILAT     Osteopenia     Varicose vein of leg     Loss of height     Family history of  ischemic heart disease     S/P lumpectomy of breast     CARDIOVASCULAR SCREENING; LDL GOAL LESS THAN 160     [place] (hard of hearing)     Anxiety     Advanced directives, counseling/discussion     Mild major depression (H)     HTN, goal below 150/90     Memory changes     Mild dementia     Dementia     Agitation      Current Medications and Allergies:  See printed Medication Report

## 2018-01-25 ENCOUNTER — DISCHARGE SUMMARY NURSING HOME (OUTPATIENT)
Dept: GERIATRICS | Facility: CLINIC | Age: 83
End: 2018-01-25
Payer: COMMERCIAL

## 2018-01-25 ENCOUNTER — HOSPITAL LABORATORY (OUTPATIENT)
Facility: OTHER | Age: 83
End: 2018-01-25

## 2018-01-25 VITALS
RESPIRATION RATE: 18 BRPM | DIASTOLIC BLOOD PRESSURE: 80 MMHG | WEIGHT: 167.2 LBS | SYSTOLIC BLOOD PRESSURE: 151 MMHG | HEART RATE: 76 BPM | BODY MASS INDEX: 29.38 KG/M2 | OXYGEN SATURATION: 93 % | TEMPERATURE: 95.3 F

## 2018-01-25 DIAGNOSIS — R45.1 AGITATION: ICD-10-CM

## 2018-01-25 DIAGNOSIS — R29.6 FALLS FREQUENTLY: ICD-10-CM

## 2018-01-25 DIAGNOSIS — Z71.89 ADVANCED DIRECTIVES, COUNSELING/DISCUSSION: ICD-10-CM

## 2018-01-25 DIAGNOSIS — I10 HTN, GOAL BELOW 150/90: ICD-10-CM

## 2018-01-25 DIAGNOSIS — F32.0 MILD MAJOR DEPRESSION (H): ICD-10-CM

## 2018-01-25 DIAGNOSIS — F03.91 DEMENTIA WITH BEHAVIORAL DISTURBANCE, UNSPECIFIED DEMENTIA TYPE: Primary | ICD-10-CM

## 2018-01-25 LAB — TSH SERPL DL<=0.005 MIU/L-ACNC: 2 MU/L (ref 0.4–4)

## 2018-01-25 PROCEDURE — 99316 NF DSCHRG MGMT 30 MIN+: CPT | Performed by: NURSE PRACTITIONER

## 2018-01-25 NOTE — LETTER
1/25/2018        RE: Sherri Bender  [address] [place] [address]          [place] [facility] DISCHARGE SUMMARY    PATIENT'S NAME: Sherri Bender  YOB: 1932  MEDICAL RECORD NUMBER:  [id number]    PRIMARY CARE PROVIDER AND CLINIC RESPONSIBLE AFTER TRANSFER: [place] [address] [facility] [address]     CODE STATUS/ADVANCE DIRECTIVES DISCUSSION:   DNR / DNI       Allergies   Allergen Reactions     Nkda [No Known Drug Allergies]        TRANSFERRING PROVIDERS: VALERIE Shultz CNP, Aquiles Hernandez MD  DATE OF SNF ADMISSION:  January / 17 / 2018  DATE OF SNF (anticipated) DISCHARGE: January / 26 / 2018  DISCHARGE DISPOSITION: [facility] Provider   Nursing Facility: [facility] stay 1/14/18  to 1/17/18.     Condition on Discharge:  Stable.  Function:  Ambulatory with assist short distances, does self transfer at times, uses wheelchair for most mobility    Equipment: wheelchair    DISCHARGE DIAGNOSIS:   1. Dementia with behavioral disturbance, unspecified dementia type    2. Agitation    3. Falls frequently    4. Mild major depression (H)    5. HTN, goal below 150/90    6. Advanced directives, counseling/discussion        HPI Nursing Facility Course:  HPI information obtained from: facility chart records, facility staff and [facility] chart review.  Sherri admitted to [facility] [place] after fall and increased confusion. She has been found standing/on the floor a several times since admission. Family feels they can no longer take care of patient at home. She was admitted to [place], but plans discharge in order to admit to [facility] for LTC placement (which is closer to family/[employer] friends)  Dementia with behavioral disturbance, unspecified dementia type  Agitation  On low dose donepezil, seroquel bid, zoloft, trazodone.  She was previously on risperidol and higher dose aricept, ativan.   "Risperidol and ativan discontinued, Aricept decreased as this may have been \"activating\" for her instead of helpful.  Seroquel has been decreased, and ongoing GDR will likely be helpful as well. Since these changes, she has had less falls, less behaviors. Today, she is confused but alert, unable to answer simple questions.     Falls frequently  Non-traumatic falls while at [facility], no injuries    Mild major depression (H)  On zoloft, no anxiety noted today. The current medical regimen is effective;  continue present plan and medications.     HTN, goal below 150/90  On losartan, hydrochlorothiazide. The current medical regimen is effective;  continue present plan and medications.   BP Readings from Last 6 Encounters:   01/25/18 151/80   01/22/18 103/43   01/17/18 121/60   10/11/17 136/82   10/05/16 125/74   11/10/15 142/73        Advanced directives, counseling/discussion  Per signed POLST on file.   - DNR/DNI    PAST MEDICAL HISTORY:  has a past medical history of Dementia; Depression; Environmental allergies; HTN (hypertension); and Osteoarthritis.    DISCHARGE MEDICATIONS:  Current Outpatient Prescriptions   Medication Sig Dispense Refill     hydrochlorothiazide (MICROZIDE) 12.5 MG capsule Take 12.5 mg by mouth daily       Acetaminophen (TYLENOL PO) Take 1,000 mg by mouth 3 times daily       Donepezil HCl (ARICEPT PO) Take 5 mg by mouth At Bedtime       Sertraline HCl (ZOLOFT PO) Take by mouth daily 75mg PO daily       TRAZODONE HCL PO Take 25 mg by mouth 2 times daily as needed for sleep       QUEtiapine (SEROQUEL) 50 MG tablet Take 1 tablet (50 mg) by mouth At Bedtime 60 tablet      QUEtiapine (SEROQUEL) 25 MG tablet Take 12.5 mg by mouth daily (with breakfast)  60 tablet      CRANBERRY PO Take 250 mg by mouth 3 times daily        MELATONIN PO Take 10 mg by mouth At Bedtime       loratadine (CLARITIN) 10 MG tablet Take 10 mg by mouth daily       losartan (COZAAR) 25 MG tablet Take 1 tablet (25 " mg) by mouth daily (Needs follow-up appointment for this medication) 30 tablet 0     ASPIRIN CAPS 81 MG OR 1 TABLET DAILY         MEDICATION CHANGES/RATIONALE:   As noted above.   Controlled medications sent with patient: not applicable/none     ROS:    Unobtainable secondary to cognitive impairment or aphasia, but today pt reports no new concerns.     Physical Exam:   Vitals: /80  Pulse 76  Temp 95.3  F (35.2  C)  Resp 18  Wt 167 lb 3.2 oz (75.8 kg)  SpO2 93%  BMI 29.38 kg/m2  BMI= Body mass index is 29.38 kg/(m^2).  GENERAL APPEARANCE:  Alert, in no distress  HEAD:  Normal, normocephalic, atraumatic  EYE EXAM: normal external eye, conjunctiva, lids, CJ  NECK EXAM: supple, no JVD  CHEST/RESP:  respiratory effort normal, lung sounds CTA , no respiratory distress  CV:  Rate reg, rhythm reg, no murmur, no peripheral edema   M/S:   extremities normal, gait abnormal-needs assist, normal muscle tone, and range of motion normal   NEUROLOGIC EXAM: Normal gross motor movement, tone and coordination. No tremor.  Cranial nerves 2-12 are normal tested and grossly at patient's baseline  PSYCH:  Alert and oriented to self with forgetfulness, affect pleasant , judgement impaired by cognitive losses        DISCHARGE PLAN:  discharge to LTC at [facility]  Patient instructed to follow-up with:  PCP in 7 days  , or establish care with in-house rounding team    Current [place] scheduled appointments:  No future appointments.    MTM referral needed and placed by this provider: No    Pending labs: none    [facility] labs   Hospital Laboratory on 01/25/2018   Component Date Value Ref Range Status     TSH 01/25/2018 2.00  0.40 - 4.00 mU/L Final         Discharge Treatments:  Orders:  1. May Discharge to [facility] with all current medications and treatments  2. Establish care with primary Rounding team (lois) at [facility]    TOTAL DISCHARGE TIME:   Greater than 30 minutes  Electronically signed by:  VALERIE Shultz  CNP      Sincerely,        VALERIE Shultz CNP

## 2018-01-29 ENCOUNTER — TELEPHONE (OUTPATIENT)
Dept: FAMILY MEDICINE | Facility: CLINIC | Age: 83
End: 2018-01-29

## 2018-01-29 ENCOUNTER — CARE COORDINATION (OUTPATIENT)
Dept: CARE COORDINATION | Facility: CLINIC | Age: 83
End: 2018-01-29

## 2018-01-29 NOTE — PROGRESS NOTES
Clinic Care Coordination Contact    Situation: Patient chart reviewed by care coordinator.    Background: pt had been admitted to TCU after a fall.    Assessment: pt was admitted to the TCU on 1/17/18 and discharged on 1/26/17.  Pt's family is no longer able to care for pt at home and she was [facility].     Plan/Recommendations: no follow up at this time as pt is placed in SNF/LTC facility with no plans to return home.     WALTER Ying, Clinic Care Coordinator 1/29/2018   10:22 AM  [phone]

## 2018-01-29 NOTE — TELEPHONE ENCOUNTER
"ED/Discharge Protocol    \"Hi, my name is Haylee Jacobson, a registered nurse, and I am calling on behalf of 's office at [place].  I am calling to follow up and see how things are going for you after your recent visit.\"    \"I see that you were in the ER/IP/TCU for a fall dc'd on 1/25/18.  How are you doing now that you are home?\" Pt to f/u with PCP in 7 days. Spoke with family and they expressed concerns and said pt will not be coming back to  for care. KpavelRN          "

## 2018-01-29 NOTE — TELEPHONE ENCOUNTER
ED/UC/IP follow up phone call:   Dementia With Behavioral Disturbance, Unspecified Dementia Type  1/26/2018   Geriatric Services    RN please call to follow up.    Number of ED visits in past 12 months = 0    Rena Ferro

## 2018-01-31 ENCOUNTER — NURSING HOME VISIT (OUTPATIENT)
Dept: GERIATRICS | Facility: CLINIC | Age: 83
End: 2018-01-31
Payer: COMMERCIAL

## 2018-01-31 VITALS
TEMPERATURE: 98.3 F | SYSTOLIC BLOOD PRESSURE: 101 MMHG | HEART RATE: 84 BPM | OXYGEN SATURATION: 96 % | DIASTOLIC BLOOD PRESSURE: 56 MMHG | RESPIRATION RATE: 18 BRPM

## 2018-01-31 DIAGNOSIS — H91.93 BILATERAL HEARING LOSS, UNSPECIFIED HEARING LOSS TYPE: ICD-10-CM

## 2018-01-31 DIAGNOSIS — F32.0 MILD MAJOR DEPRESSION (H): ICD-10-CM

## 2018-01-31 DIAGNOSIS — R26.2 DIFFICULTY WALKING: ICD-10-CM

## 2018-01-31 DIAGNOSIS — F22 PARANOIA (H): Primary | ICD-10-CM

## 2018-01-31 PROCEDURE — 99310 SBSQ NF CARE HIGH MDM 45: CPT | Performed by: NURSE PRACTITIONER

## 2018-02-01 NOTE — PROGRESS NOTES
[place] [facility]  PRIMARY CARE PROVIDER AND CLINIC:  Kameron CoolNate [address] [facility] [address]  Chief Complaint   Patient presents with     Hospital F/U     Clinic Care Coordination - Initial       HPI:    Sherri Bender is a 85 year old  (6/6/1932),admitted to the [facility]  from [facility] [place].  Hospital stay 1/14/18 through 1/17/18.  Admitted to this facility for  medical management and nursing care.  HPI information obtained from: facility chart records, facility staff, patient report and [facility] chart review.  Current issues are:         Paranoia (H)  Mild major depression (H)  [place] (hard of hearing)  Difficulty walking      CODE STATUS/ADVANCE DIRECTIVES DISCUSSION:   DNR / DNI  Patient's living condition: lives in a nursing home    ALLERGIES:Nkda [no known drug allergies]  PAST MEDICAL HISTORY:  has a past medical history of Dementia; Depression; Environmental allergies; HTN (hypertension); and Osteoarthritis.  PAST SURGICAL HISTORY:  has a past surgical history that includes appendectomy; surgical history of - ; surgical history of - ; tonsillectomy; cataract iol, rt/lt (4/2010); and Colonoscopy (6/20/2011).  FAMILY HISTORY: family history includes Allergies in her brother; CANCER in her brother and mother; HEART DISEASE in her father and mother.  SOCIAL HISTORY:  reports that she has quit smoking. She has never used smokeless tobacco. She reports that she does not drink alcohol or use illicit drugs.    Post Discharge Medication Reconciliation Status: discharge medications reconciled, continue medications without change.  Current Outpatient Prescriptions   Medication Sig Dispense Refill     hydrochlorothiazide (MICROZIDE) 12.5 MG capsule Take 12.5 mg by mouth daily       Acetaminophen (TYLENOL PO) Take 1,000 mg by mouth 3 times daily       Donepezil HCl (ARICEPT PO) Take 5 mg by mouth At Bedtime       Sertraline HCl (ZOLOFT PO) Take by mouth  daily 75mg PO daily       TRAZODONE HCL PO Take 25 mg by mouth 2 times daily as needed for sleep       QUEtiapine (SEROQUEL) 50 MG tablet Take 1 tablet (50 mg) by mouth At Bedtime 60 tablet      QUEtiapine (SEROQUEL) 25 MG tablet Take 12.5 mg by mouth daily (with breakfast)  60 tablet      CRANBERRY PO Take 250 mg by mouth 3 times daily        MELATONIN PO Take 10 mg by mouth At Bedtime       loratadine (CLARITIN) 10 MG tablet Take 10 mg by mouth daily       losartan (COZAAR) 25 MG tablet Take 1 tablet (25 mg) by mouth daily (Needs follow-up appointment for this medication) 30 tablet 0     ASPIRIN CAPS 81 MG OR 1 TABLET DAILY         ROS:  10 point ROS of systems including Constitutional, Eyes, Respiratory, Cardiovascular, Gastroenterology, Genitourinary, Integumentary, Muscularskeletal, Psychiatric were all negative except for pertinent positives noted in my HPI.    Exam:  /56  Pulse 84  Temp 98.3  F (36.8  C)  Resp 18  SpO2 96%  GENERAL APPEARANCE:  Alert, in no distress  ENT:  [place], oral mucous membranes moist  EYES:  EOM normal, conjunctiva and lids normal  RESP:  lungs clear to auscultation , no respiratory distress, diminished breath sounds bases  CV:  Palpation and auscultation of heart done , regular rate and rhythm, no murmur, rub, or gallop, no edema  ABDOMEN:  bowel sounds normal, soft, non-tender  M/S:   Gait and station abnormal up in w/c  frail HARTMANN  SKIN:  Pale w/d  PSYCH:  oriented to self, insight and judgement impaired, memory impaired , affect and mood normal    Lab/Diagnostic data:      CBC RESULTS:   Recent Labs   Lab Test  01/14/18   1343  08/26/13   1841   WBC  8.2  7.7   RBC  3.79*  4.21   HGB  11.7  12.8   HCT  35.3  39.3   MCV  93  93   MCH  30.9  30.4   MCHC  33.1  32.6   RDW  13.0  13.2   PLT  226  239       Last Basic Metabolic Panel:  Recent Labs   Lab Test  01/14/18   1343  09/03/15   1659   NA  140  137   POTASSIUM  3.6  3.6   CHLORIDE  106  100   ZACK  8.6  9.3   CO2  24  30    BUN  17  16   CR  0.82  0.65   GLC  142*  86       Liver Function Studies -   Recent Labs   Lab Test  01/14/18   1343  09/03/15   1659   PROTTOTAL  6.6*  7.5   ALBUMIN  3.3*  3.9   BILITOTAL  0.3  0.4   ALKPHOS  67  63   AST  18  17   ALT  19  16       TSH   Date Value Ref Range Status   01/25/2018 2.00 0.40 - 4.00 mU/L Final   09/03/2015 1.79 0.40 - 4.00 mU/L Final   ]    Lab Results   Component Value Date    A1C 5.8 02/16/2012    A1C 5.9 07/19/2011       ASSESSMENT/PLAN:  Paranoia (H)  On seroquel with improved mood and behavior.  No adverse s/e reported   Mood and behavior improved with current medication therapy.  No adverse side effects reported.  Risk of decompensation exceeds potential benefit of GDR.    Mild major depression (H)  On zoloft and trazodone with improved mmood and comfort.    [place] (hard of hearing)   Limits communication.  Able to participate with conversation with quiet environment and 1:1 attention.  Difficulty walking  Hx of recurring falls some with injury or fracture Participates with therapy.  Unable to manage safely at home r/t falls and impulsive behavior.        Orders:  The current medical regimen is effective;  continue present plan and medications.      Total time spent with patient visit at the skilled nursing facility was 35 min including patient visit and review of past records. Greater than 50% of total time spent with counseling and coordinating care due to establish care    Electronically signed by:  VALERIE Reeder CNP

## 2018-02-07 ENCOUNTER — NURSING HOME VISIT (OUTPATIENT)
Dept: GERIATRICS | Facility: CLINIC | Age: 83
End: 2018-02-07
Payer: COMMERCIAL

## 2018-02-07 DIAGNOSIS — F22 PARANOIA (H): Primary | ICD-10-CM

## 2018-02-07 DIAGNOSIS — G30.8 ALZHEIMER'S DISEASE OF OTHER ONSET WITHOUT BEHAVIORAL DISTURBANCE: ICD-10-CM

## 2018-02-07 DIAGNOSIS — F02.80 ALZHEIMER'S DISEASE OF OTHER ONSET WITHOUT BEHAVIORAL DISTURBANCE: ICD-10-CM

## 2018-02-07 DIAGNOSIS — R52 PAIN: ICD-10-CM

## 2018-02-07 PROCEDURE — 99309 SBSQ NF CARE MODERATE MDM 30: CPT | Performed by: NURSE PRACTITIONER

## 2018-02-07 NOTE — PROGRESS NOTES
[place] [facility]    Chief Complaint   Patient presents with     Nursing Home Acute       HPI:    Sherri Bender is a 85 year old  (6/6/1932), who is being seen today for an episodic care visit at [facility].  HPI information obtained from: facility chart records, facility staff, patient report and [facility] chart review.Today's concern is:     Paranoia (H)  Pain  Alzheimer's disease of other onset without behavioral disturbance      ALLERGIES: Nkda [no known drug allergies]  Past Medical, Surgical, Family and Social History reviewed and updated in [facility].    Current Outpatient Prescriptions   Medication Sig Dispense Refill     hydrochlorothiazide (MICROZIDE) 12.5 MG capsule Take 12.5 mg by mouth daily       Acetaminophen (TYLENOL PO) Take 1,000 mg by mouth 3 times daily       Donepezil HCl (ARICEPT PO) Take 5 mg by mouth At Bedtime       Sertraline HCl (ZOLOFT PO) Take by mouth daily 75mg PO daily       TRAZODONE HCL PO Take 25 mg by mouth 2 times daily as needed for sleep       QUEtiapine (SEROQUEL) 50 MG tablet Take 1 tablet (50 mg) by mouth At Bedtime 60 tablet      QUEtiapine (SEROQUEL) 25 MG tablet Take 12.5 mg by mouth daily (with breakfast)  60 tablet      CRANBERRY PO Take 250 mg by mouth 3 times daily        MELATONIN PO Take 10 mg by mouth At Bedtime       loratadine (CLARITIN) 10 MG tablet Take 10 mg by mouth daily       losartan (COZAAR) 25 MG tablet Take 1 tablet (25 mg) by mouth daily (Needs follow-up appointment for this medication) 30 tablet 0     ASPIRIN CAPS 81 MG OR 1 TABLET DAILY       Medications reviewed:  Medications reconciled to facility chart and changes were made to reflect current medications as identified as above med list. Below are the changes that were made:   Medications stopped since last EPIC medication reconciliation:   There are no discontinued medications.    Medications started since last [facility] medication reconciliation:  No orders of the  defined types were placed in this encounter.         REVIEW OF SYSTEMS:  4 point ROS including Respiratory, CV, GI and , other than that noted in the HPI,  is negative    Physical Exam:    /56  Pulse 84  Temp 98.3  F (36.8  C)  Resp 18  SpO2 96%  GENERAL APPEARANCE:  Alert, in no distress  ENT:  [place], oral mucous membranes moist  EYES:  EOM normal, conjunctiva and lids normal  RESP:  lungs clear to auscultation , no respiratory distress, diminished breath sounds bases  CV:  Palpation and auscultation of heart done , regular rate and rhythm, no murmur, rub, or gallop, no edema  ABDOMEN:  bowel sounds normal, soft, non-tender  M/S:   Gait and station abnormal up in w/c  frail HARTMANN  SKIN:  Pale w/d  PSYCH:  oriented to self, insight and judgement impaired, memory impaired , affect and mood normal    Recent Labs:      CBC RESULTS:   Recent Labs   Lab Test  01/14/18   1343  08/26/13   1841   WBC  8.2  7.7   RBC  3.79*  4.21   HGB  11.7  12.8   HCT  35.3  39.3   MCV  93  93   MCH  30.9  30.4   MCHC  33.1  32.6   RDW  13.0  13.2   PLT  226  239       Last Basic Metabolic Panel:  Recent Labs   Lab Test  01/14/18   1343  09/03/15   1659   NA  140  137   POTASSIUM  3.6  3.6   CHLORIDE  106  100   ZACK  8.6  9.3   CO2  24  30   BUN  17  16   CR  0.82  0.65   GLC  142*  86       Liver Function Studies -   Recent Labs   Lab Test  01/14/18   1343  09/03/15   1659   PROTTOTAL  6.6*  7.5   ALBUMIN  3.3*  3.9   BILITOTAL  0.3  0.4   ALKPHOS  67  63   AST  18  17   ALT  19  16       TSH   Date Value Ref Range Status   01/25/2018 2.00 0.40 - 4.00 mU/L Final   09/03/2015 1.79 0.40 - 4.00 mU/L Final   ]    Lab Results   Component Value Date    A1C 5.8 02/16/2012    A1C 5.9 07/19/2011         Assessment/Plan:  Paranoia (H)  Admitted to NH on seroquel 75 mg qd r/t aggression/agitation  Now on slow taper r/t improved behavior/mood  Discussed with staff    Pain  Recently started on scheduled and prn tramadol  Staff reports patient  appears more comfortable  Alzheimer's disease of other onset without behavioral disturbance  With physical and functional decline Resides in dementia [place]strong  Goal is to emphasize comfort with conservative treatment in NH    Orders:  Taper serqoel to 25 mg po qd   Continue same tramadol  Monitor pain managment    Total time spent with patient visit at the skilled nursing facility was 25 min including patient visit and review of past records. Greater than 50% of total time spent with counseling and coordinating care due to  recheck    Electronically signed by  VALERIE Reeder CNP

## 2018-02-13 ENCOUNTER — NURSING HOME VISIT (OUTPATIENT)
Dept: GERIATRICS | Facility: CLINIC | Age: 83
End: 2018-02-13
Payer: COMMERCIAL

## 2018-02-13 VITALS
SYSTOLIC BLOOD PRESSURE: 112 MMHG | TEMPERATURE: 97.9 F | DIASTOLIC BLOOD PRESSURE: 77 MMHG | HEART RATE: 87 BPM | RESPIRATION RATE: 16 BRPM | BODY MASS INDEX: 29.41 KG/M2 | HEIGHT: 63 IN | WEIGHT: 166 LBS | OXYGEN SATURATION: 95 %

## 2018-02-13 DIAGNOSIS — I50.20 SYSTOLIC CONGESTIVE HEART FAILURE, UNSPECIFIED CONGESTIVE HEART FAILURE CHRONICITY: Primary | ICD-10-CM

## 2018-02-13 DIAGNOSIS — F02.818 ALZHEIMER'S DISEASE OF OTHER ONSET WITH BEHAVIORAL DISTURBANCE: ICD-10-CM

## 2018-02-13 DIAGNOSIS — F22 PARANOIA (H): ICD-10-CM

## 2018-02-13 DIAGNOSIS — F32.0 MILD MAJOR DEPRESSION (H): ICD-10-CM

## 2018-02-13 DIAGNOSIS — G30.8 ALZHEIMER'S DISEASE OF OTHER ONSET WITH BEHAVIORAL DISTURBANCE: ICD-10-CM

## 2018-02-13 PROCEDURE — 99309 SBSQ NF CARE MODERATE MDM 30: CPT | Performed by: NURSE PRACTITIONER

## 2018-02-13 NOTE — PROGRESS NOTES
[place] [facility]    Chief Complaint   Patient presents with     detention Regulatory       HPI:    Sherri Bender is a 85 year old  (6/6/1932), who is being seen today for a federally mandated E/M visit at [facility].  HPI information obtained from: facility chart records, facility staff, patient report and [facility] chart review. Today's concerns are:     Systolic congestive heart failure, unspecified congestive heart failure chronicity (H)  Paranoia (H)  Mild major depression (H)  Alzheimer's disease of other onset with behavioral disturbance      ALLERGIES: Nkda [no known drug allergies]  PAST MEDICAL HISTORY:  has a past medical history of Dementia; Depression; Environmental allergies; HTN (hypertension); and Osteoarthritis.  PAST SURGICAL HISTORY:  has a past surgical history that includes appendectomy; surgical history of - ; surgical history of - ; tonsillectomy; cataract iol, rt/lt (4/2010); and Colonoscopy (6/20/2011).  FAMILY HISTORY: family history includes Allergies in her brother; CANCER in her brother and mother; HEART DISEASE in her father and mother.  SOCIAL HISTORY:  reports that she has quit smoking. She has never used smokeless tobacco. She reports that she does not drink alcohol or use illicit drugs.    MEDICATIONS:  Current Outpatient Prescriptions   Medication Sig Dispense Refill     hydrochlorothiazide (MICROZIDE) 12.5 MG capsule Take 12.5 mg by mouth daily       Acetaminophen (TYLENOL PO) Take 1,000 mg by mouth 3 times daily       Donepezil HCl (ARICEPT PO) Take 5 mg by mouth At Bedtime       Sertraline HCl (ZOLOFT PO) Take by mouth daily 75mg PO daily       TRAZODONE HCL PO Take 25 mg by mouth 2 times daily as needed for sleep       QUEtiapine (SEROQUEL) 50 MG tablet Take 1 tablet (50 mg) by mouth At Bedtime 60 tablet      QUEtiapine (SEROQUEL) 25 MG tablet Take 12.5 mg by mouth daily (with breakfast)  60 tablet      CRANBERRY PO Take 250 mg by mouth 3  "times daily        MELATONIN PO Take 10 mg by mouth At Bedtime       loratadine (CLARITIN) 10 MG tablet Take 10 mg by mouth daily       losartan (COZAAR) 25 MG tablet Take 1 tablet (25 mg) by mouth daily (Needs follow-up appointment for this medication) 30 tablet 0     ASPIRIN CAPS 81 MG OR 1 TABLET DAILY       Medications reviewed:  Medications reconciled to facility chart and changes were made to reflect current medications as identified as above med list. Below are the changes that were made:   Medications stopped since last EPIC medication reconciliation:   There are no discontinued medications.    Medications started since last [facility] medication reconciliation:  No orders of the defined types were placed in this encounter.         Case Management:  I have reviewed the care plan and MDS and do agree with the plan. Patient's desire to return to the community is present, but is not able due to care needs .  Information reviewed:  Medications, vital signs, orders, and nursing notes.    ROS:  4 point ROS including Respiratory, CV, GI and , other than that noted in the HPI,  is negative    Exam:  Vitals: /77  Pulse 87  Temp 97.9  F (36.6  C)  Resp 16  Ht 5' 3\" (1.6 m)  Wt 166 lb (75.3 kg)  SpO2 95%  BMI 29.41 kg/m2  BMI= Body mass index is 29.41 kg/(m^2).  GENERAL APPEARANCE:  Alert, in no distress  ENT:  [place], oral mucous membranes moist  EYES:  EOM normal, conjunctiva and lids normal  RESP:  lungs clear to auscultation , no respiratory distress, diminished breath sounds bases  CV:  Palpation and auscultation of heart done , regular rate and rhythm, no murmur, rub, or gallop, no edema  ABDOMEN:  bowel sounds normal, soft, non-tender  M/S:   Gait and station abnormal up in w/c  frail HARTMANN  SKIN:  Pale w/d  PSYCH:  oriented to self, insight and judgement impaired, memory impaired , affect and mood normal    Lab/Diagnostic data:      CBC RESULTS:   Recent Labs   Lab Test  01/14/18   1343  08/26/13   1841 "   WBC  8.2  7.7   RBC  3.79*  4.21   HGB  11.7  12.8   HCT  35.3  39.3   MCV  93  93   MCH  30.9  30.4   MCHC  33.1  32.6   RDW  13.0  13.2   PLT  226  239       Last Basic Metabolic Panel:  Recent Labs   Lab Test  01/14/18   1343  09/03/15   1659   NA  140  137   POTASSIUM  3.6  3.6   CHLORIDE  106  100   ZACK  8.6  9.3   CO2  24  30   BUN  17  16   CR  0.82  0.65   GLC  142*  86       Liver Function Studies -   Recent Labs   Lab Test  01/14/18   1343  09/03/15   1659   PROTTOTAL  6.6*  7.5   ALBUMIN  3.3*  3.9   BILITOTAL  0.3  0.4   ALKPHOS  67  63   AST  18  17   ALT  19  16       TSH   Date Value Ref Range Status   01/25/2018 2.00 0.40 - 4.00 mU/L Final   09/03/2015 1.79 0.40 - 4.00 mU/L Final   ]    Lab Results   Component Value Date    A1C 5.8 02/16/2012    A1C 5.9 07/19/2011       ASSESSMENT/PLAN  Paranoia (H)  Admitted to NH on seroquel 75 mg qd r/t aggression/agitation  Now on slow taper r/t improved behavior/mood  Discussed with staff    Pain  Recently started on scheduled and prn tramadol  Staff reports patient appears more comfortable  Alzheimer's disease of other onset without behavioral disturbance  With physical and functional decline Resides in dementia armstrong  Goal is to emphasize comfort with conservative treatment in NH  On aricept     Orders:  Continue to monitor for paranoia with goal to continue taper of seroquel    O/w The current medical regimen is effective;  continue present plan and medications.      Total time spent with patient visit at the skilled nursing facility was 25 min including patient visit and review of past records. Greater than 50% of total time spent with counseling and coordinating care due to reg    Electronically signed by:  Lyndsay Ramos CMA

## 2018-02-25 ENCOUNTER — TELEPHONE (OUTPATIENT)
Dept: GERIATRICS | Facility: CLINIC | Age: 83
End: 2018-02-25

## 2018-02-25 ENCOUNTER — HOSPITAL LABORATORY (OUTPATIENT)
Facility: OTHER | Age: 83
End: 2018-02-25

## 2018-02-25 NOTE — TELEPHONE ENCOUNTER
Patient with urinary frequency, urgency and increased confusion. No fever    PLAN  UA/UC today.     Electronically signed by VALERIE Ramirez GNP

## 2018-02-26 LAB
ALBUMIN UR-MCNC: NEGATIVE MG/DL
APPEARANCE UR: NORMAL
BACTERIA SPEC CULT: NORMAL
BACTERIA SPEC CULT: NORMAL
BILIRUB UR QL STRIP: NEGATIVE
COLOR UR AUTO: YELLOW
GLUCOSE UR STRIP-MCNC: NEGATIVE MG/DL
HGB UR QL STRIP: NEGATIVE
KETONES UR STRIP-MCNC: NEGATIVE MG/DL
LEUKOCYTE ESTERASE UR QL STRIP: NEGATIVE
NITRATE UR QL: NEGATIVE
PH UR STRIP: 6 PH (ref 5–7)
SOURCE: NORMAL
SP GR UR STRIP: 1.02 (ref 1–1.03)
SPECIMEN SOURCE: NORMAL
UROBILINOGEN UR STRIP-MCNC: NORMAL MG/DL (ref 0–2)

## 2018-03-11 VITALS
TEMPERATURE: 98.1 F | OXYGEN SATURATION: 96 % | WEIGHT: 167 LBS | BODY MASS INDEX: 29.58 KG/M2 | RESPIRATION RATE: 18 BRPM | DIASTOLIC BLOOD PRESSURE: 54 MMHG | HEART RATE: 85 BPM | SYSTOLIC BLOOD PRESSURE: 112 MMHG

## 2018-03-11 RX ORDER — AMOXICILLIN 250 MG
1 CAPSULE ORAL 2 TIMES DAILY
COMMUNITY

## 2018-03-11 RX ORDER — BISACODYL 10 MG
10 SUPPOSITORY, RECTAL RECTAL DAILY PRN
COMMUNITY
End: 2021-04-15

## 2018-03-11 NOTE — PROGRESS NOTES
[place] [facility]    Chief Complaint   Patient presents with     [facility] Regulatory       HPI:    Sehrri Bender is a 85 year old  (6/6/1932), who is being seen today for a federally mandated E/M visit at [facility] .  HPI information obtained from: facility chart records, facility staff, patient report and [facility] chart review.     Resident with PMH significant for worsening dementia, admitted to [facility] in January 2018 after a fall, imagining negative, hospitalization c/w agitation and delirium, started on Seroquel and risperidone, with [facility] psych consult on outpatient basis, admitted to [facility] for rehab initially, then transferred here to be close to family    Today's concerns are:  - Resident seen and examined.  Reports sleep, appetite and BM are fine.   - denies chest pain or SOB. walks using WC. denies legs swelling or pain.   - RN reports no behavioral issue to report.   - GNP has no concern today.       ALLERGIES: Nkda [no known drug allergies]  PAST MEDICAL HISTORY:  has a past medical history of Dementia; Depression; Environmental allergies; HTN (hypertension); and Osteoarthritis.  PAST SURGICAL HISTORY:  has a past surgical history that includes appendectomy; surgical history of - ; surgical history of - ; tonsillectomy; cataract iol, rt/lt (4/2010); and Colonoscopy (6/20/2011).  FAMILY HISTORY: family history includes Allergies in her brother; CANCER in her brother and mother; HEART DISEASE in her father and mother.  SOCIAL HISTORY:  reports that she has quit smoking. She has never used smokeless tobacco. She reports that she does not drink alcohol or use illicit drugs.    MEDICATIONS:  Current Outpatient Prescriptions   Medication Sig Dispense Refill     bisacodyl (DULCOLAX) 10 MG Suppository Place 10 mg rectally daily as needed for constipation       senna-docusate (SENOKOT-S;PERICOLACE) 8.6-50 MG per tablet Take 1 tablet by mouth 2 times daily        TRAMADOL HCL PO Take 25 mg by mouth 2 times daily And q3 hours PRN       hydrochlorothiazide (MICROZIDE) 12.5 MG capsule Take 12.5 mg by mouth daily       Acetaminophen (TYLENOL PO) Take 1,000 mg by mouth 3 times daily       Donepezil HCl (ARICEPT PO) Take 5 mg by mouth At Bedtime       Sertraline HCl (ZOLOFT PO) Take by mouth daily 75mg PO daily       TRAZODONE HCL PO Take 25 mg by mouth 2 times daily as needed for sleep       CRANBERRY PO Take 250 mg by mouth 3 times daily        MELATONIN PO Take 10 mg by mouth At Bedtime       loratadine (CLARITIN) 10 MG tablet Take 10 mg by mouth daily       losartan (COZAAR) 25 MG tablet Take 1 tablet (25 mg) by mouth daily (Needs follow-up appointment for this medication) 30 tablet 0     ASPIRIN CAPS 81 MG OR 1 TABLET DAILY       Medications reviewed:  Medications reconciled to facility chart and changes were made to reflect current medications as identified as above med list. Below are the changes that were made:   Medications stopped since last EPIC medication reconciliation:   Medications Discontinued During This Encounter   Medication Reason     Quetiapine (SEROQUEL) 25 MG tablet Medication Reconciliation Clean Up     Quetiapine (SEROQUEL) 50 MG tablet Medication Reconciliation Clean Up       Medications started since last [facility] medication reconciliation:  Orders Placed This Encounter   Medications     bisacodyl (DULCOLAX) 10 MG Suppository     Sig: Place 10 mg rectally daily as needed for constipation     senna-docusate (SENOKOT-S;PERICOLACE) 8.6-50 MG per tablet     Sig: Take 1 tablet by mouth 2 times daily     TRAMADOL HCL PO     Sig: Take 25 mg by mouth 2 times daily And q3 hours PRN   Case Management:  I have reviewed the care plan and MDS and do agree with the plan. Patient's desire to return to the community is not present.  Information reviewed:  Medications, vital signs, orders, and nursing notes.    ROS:  Limited secondary to cognitive impairment but otherwise  negative except as mentioned in the HPI    Exam:  Vitals: /54  Pulse 85  Temp 98.1  F (36.7  C)  Resp 18  Wt 167 lb (75.8 kg)  SpO2 96%  BMI 29.58 kg/m2  BMI= Body mass index is 29.58 kg/(m^2).  GENERAL APPEARANCE:  in no distress  ENT:  Mouth and posterior oropharynx normal, moist mucous membranes  EYES:  EOM, conjunctivae, lids, pupils and irises normal  RESP:  respiratory effort and palpation of chest normal, lungs clear to auscultation , no respiratory distress  CV:  Palpation and auscultation of heart done , regular rate and rhythm, no murmur, rub, or gallop, no edema  ABDOMEN:  normal bowel sounds, soft, nontender, no hepatosplenomegaly or other masses  M/S:   Gait and station abnormal uses walker and a WC.   SKIN:  Inspection of skin and subcutaneous tissue baseline, Palpation of skin and subcutaneous tissue baseline  NEURO:   no NFD appreciated on observation  PSYCH:  oriented to person, mood and affect fine.     Lab/Diagnostic data:   CBC RESULTS:   Recent Labs   Lab Test  01/14/18   1343  08/26/13   1841   WBC  8.2  7.7   RBC  3.79*  4.21   HGB  11.7  12.8   HCT  35.3  39.3   MCV  93  93   MCH  30.9  30.4   MCHC  33.1  32.6   RDW  13.0  13.2   PLT  226  239       Last Basic Metabolic Panel:  Recent Labs   Lab Test  01/14/18   1343  09/03/15   1659   NA  140  137   POTASSIUM  3.6  3.6   CHLORIDE  106  100   ZACK  8.6  9.3   CO2  24  30   BUN  17  16   CR  0.82  0.65   GLC  142*  86     Liver Function Studies -   Recent Labs   Lab Test  01/14/18   1343  09/03/15   1659   PROTTOTAL  6.6*  7.5   ALBUMIN  3.3*  3.9   BILITOTAL  0.3  0.4   ALKPHOS  67  63   AST  18  17   ALT  19  16       TSH   Date Value Ref Range Status   01/25/2018 2.00 0.40 - 4.00 mU/L Final   09/03/2015 1.79 0.40 - 4.00 mU/L Final   ]    Lab Results   Component Value Date    A1C 5.8 02/16/2012    A1C 5.9 07/19/2011       ASSESSMENT/PLAN  Paranoia (H)  Agitation  - While at [facility], Risperidone -initiated during  hospitalization, stopped, Zoloft increased to 75 mg to help with anxiety- PRN ativan stopped, Trazodone prn added, Aricept reduced to 5 mg. At this facility, Seroquel tapered down to 25 mg.  - doing fine, continue to GDR meds when feasible.     Systolic congestive heart failure, unspecified congestive heart failure chronicity (H)  HTN, goal below 150/90    BP Readings from Last 3 Encounters:   03/11/18 112/54   02/13/18 112/77   01/31/18 101/56   - HCTZ reduced to 12.5 mg and losartan 25 mg daily. SBP on the soft side, will stop HCTZ  - clinically compensated.     Falls frequently  Frail elderly  - stable now, likely meds GDR helping.   - Significant  Deficits requiring NH placement. Requiring extensive assistance from nursing. 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 Chronic condition, ongoing decline expected.   -  Continue to provide redirection and reassurance as needed. Maintain safe living situation with goals focused on comfort.    Orders:  1. D/C HCTZ, otherwise continue the rest of the current plan.     Total time spent with patient visit at the skilled nursing facility was 35 min including patient visit and review of past records. Greater than 50% of total time spent with counseling and coordinating care due to above comorbidities.     Electronically signed by:  Aquiles Hernandez MD

## 2018-03-12 ENCOUNTER — NURSING HOME VISIT (OUTPATIENT)
Dept: GERIATRICS | Facility: CLINIC | Age: 83
End: 2018-03-12
Payer: COMMERCIAL

## 2018-03-12 DIAGNOSIS — I10 HTN, GOAL BELOW 150/90: ICD-10-CM

## 2018-03-12 DIAGNOSIS — R45.1 AGITATION: ICD-10-CM

## 2018-03-12 DIAGNOSIS — G30.8 ALZHEIMER'S DISEASE OF OTHER ONSET WITH BEHAVIORAL DISTURBANCE: ICD-10-CM

## 2018-03-12 DIAGNOSIS — R29.6 FALLS FREQUENTLY: ICD-10-CM

## 2018-03-12 DIAGNOSIS — F22 PARANOIA (H): Primary | ICD-10-CM

## 2018-03-12 DIAGNOSIS — R54 FRAIL ELDERLY: ICD-10-CM

## 2018-03-12 DIAGNOSIS — F02.818 ALZHEIMER'S DISEASE OF OTHER ONSET WITH BEHAVIORAL DISTURBANCE: ICD-10-CM

## 2018-03-12 DIAGNOSIS — I50.20 SYSTOLIC CONGESTIVE HEART FAILURE, UNSPECIFIED CONGESTIVE HEART FAILURE CHRONICITY: ICD-10-CM

## 2018-03-12 PROCEDURE — 99305 1ST NF CARE MODERATE MDM 35: CPT | Performed by: FAMILY MEDICINE

## 2018-03-12 NOTE — LETTER
3/12/2018        RE: Sherri Bender  [address] [place] [address]          [facility]    Chief Complaint   Patient presents with     longterm Regulatory       HPI:    Sherri Bender is a 85 year old  (6/6/1932), who is being seen today for a federally mandated E/M visit at [facility] .  HPI information obtained from: facility chart records, facility staff, patient report and [facility] chart review.     Resident with PMH significant for worsening dementia, admitted to [facility] in January 2018 after a fall, imagining negative, hospitalization c/w agitation and delirium, started on Seroquel and risperidone, with [facility] psych consult on outpatient basis, admitted to [facility] for rehab initially, then transferred here to be close to family    Today's concerns are:  - Resident seen and examined.  Reports sleep, appetite and BM are fine.   - denies chest pain or SOB. walks using WC. denies legs swelling or pain.   - RN reports no behavioral issue to report.   - GNP has no concern today.       ALLERGIES: Nkda [no known drug allergies]  PAST MEDICAL HISTORY:  has a past medical history of Dementia; Depression; Environmental allergies; HTN (hypertension); and Osteoarthritis.  PAST SURGICAL HISTORY:  has a past surgical history that includes appendectomy; surgical history of - ; surgical history of - ; tonsillectomy; cataract iol, rt/lt (4/2010); and Colonoscopy (6/20/2011).  FAMILY HISTORY: family history includes Allergies in her brother; CANCER in her brother and mother; HEART DISEASE in her father and mother.  SOCIAL HISTORY:  reports that she has quit smoking. She has never used smokeless tobacco. She reports that she does not drink alcohol or use illicit drugs.    MEDICATIONS:  Current Outpatient Prescriptions   Medication Sig Dispense Refill     bisacodyl (DULCOLAX) 10 MG Suppository Place 10 mg rectally daily as needed for constipation        senna-docusate (SENOKOT-S;PERICOLACE) 8.6-50 MG per tablet Take 1 tablet by mouth 2 times daily       TRAMADOL HCL PO Take 25 mg by mouth 2 times daily And q3 hours PRN       hydrochlorothiazide (MICROZIDE) 12.5 MG capsule Take 12.5 mg by mouth daily       Acetaminophen (TYLENOL PO) Take 1,000 mg by mouth 3 times daily       Donepezil HCl (ARICEPT PO) Take 5 mg by mouth At Bedtime       Sertraline HCl (ZOLOFT PO) Take by mouth daily 75mg PO daily       TRAZODONE HCL PO Take 25 mg by mouth 2 times daily as needed for sleep       CRANBERRY PO Take 250 mg by mouth 3 times daily        MELATONIN PO Take 10 mg by mouth At Bedtime       loratadine (CLARITIN) 10 MG tablet Take 10 mg by mouth daily       losartan (COZAAR) 25 MG tablet Take 1 tablet (25 mg) by mouth daily (Needs follow-up appointment for this medication) 30 tablet 0     ASPIRIN CAPS 81 MG OR 1 TABLET DAILY       Medications reviewed:  Medications reconciled to facility chart and changes were made to reflect current medications as identified as above med list. Below are the changes that were made:   Medications stopped since last EPIC medication reconciliation:   Medications Discontinued During This Encounter   Medication Reason     Quetiapine (SEROQUEL) 25 MG tablet Medication Reconciliation Clean Up     Quetiapine (SEROQUEL) 50 MG tablet Medication Reconciliation Clean Up       Medications started since last [facility] medication reconciliation:  Orders Placed This Encounter   Medications     bisacodyl (DULCOLAX) 10 MG Suppository     Sig: Place 10 mg rectally daily as needed for constipation     senna-docusate (SENOKOT-S;PERICOLACE) 8.6-50 MG per tablet     Sig: Take 1 tablet by mouth 2 times daily     TRAMADOL HCL PO     Sig: Take 25 mg by mouth 2 times daily And q3 hours PRN   Case Management:  I have reviewed the care plan and MDS and do agree with the plan. Patient's desire to return to the community is not present.  Information reviewed:  Medications, vital  signs, orders, and nursing notes.    ROS:  Limited secondary to cognitive impairment but otherwise negative except as mentioned in the HPI    Exam:  Vitals: /54  Pulse 85  Temp 98.1  F (36.7  C)  Resp 18  Wt 167 lb (75.8 kg)  SpO2 96%  BMI 29.58 kg/m2  BMI= Body mass index is 29.58 kg/(m^2).  GENERAL APPEARANCE:  in no distress  ENT:  Mouth and posterior oropharynx normal, moist mucous membranes  EYES:  EOM, conjunctivae, lids, pupils and irises normal  RESP:  respiratory effort and palpation of chest normal, lungs clear to auscultation , no respiratory distress  CV:  Palpation and auscultation of heart done , regular rate and rhythm, no murmur, rub, or gallop, no edema  ABDOMEN:  normal bowel sounds, soft, nontender, no hepatosplenomegaly or other masses  M/S:   Gait and station abnormal uses walker and a WC.   SKIN:  Inspection of skin and subcutaneous tissue baseline, Palpation of skin and subcutaneous tissue baseline  NEURO:   no NFD appreciated on observation  PSYCH:  oriented to person, mood and affect fine.     Lab/Diagnostic data:   CBC RESULTS:   Recent Labs   Lab Test  01/14/18   1343  08/26/13   1841   WBC  8.2  7.7   RBC  3.79*  4.21   HGB  11.7  12.8   HCT  35.3  39.3   MCV  93  93   MCH  30.9  30.4   MCHC  33.1  32.6   RDW  13.0  13.2   PLT  226  239       Last Basic Metabolic Panel:  Recent Labs   Lab Test  01/14/18   1343  09/03/15   1659   NA  140  137   POTASSIUM  3.6  3.6   CHLORIDE  106  100   ZACK  8.6  9.3   CO2  24  30   BUN  17  16   CR  0.82  0.65   GLC  142*  86     Liver Function Studies -   Recent Labs   Lab Test  01/14/18   1343  09/03/15   1659   PROTTOTAL  6.6*  7.5   ALBUMIN  3.3*  3.9   BILITOTAL  0.3  0.4   ALKPHOS  67  63   AST  18  17   ALT  19  16       TSH   Date Value Ref Range Status   01/25/2018 2.00 0.40 - 4.00 mU/L Final   09/03/2015 1.79 0.40 - 4.00 mU/L Final   ]    Lab Results   Component Value Date    A1C 5.8 02/16/2012    A1C 5.9 07/19/2011        ASSESSMENT/PLAN  Paranoia (H)  Agitation  - While at [facility], Risperidone -initiated during hospitalization, stopped, Zoloft increased to 75 mg to help with anxiety- PRN ativan stopped, Trazodone prn added, Aricept reduced to 5 mg. At this facility, Seroquel tapered down to 25 mg.  - doing fine, continue to GDR meds when feasible.     Systolic congestive heart failure, unspecified congestive heart failure chronicity (H)  HTN, goal below 150/90    BP Readings from Last 3 Encounters:   03/11/18 112/54   02/13/18 112/77   01/31/18 101/56   - HCTZ reduced to 12.5 mg and losartan 25 mg daily. SBP on the soft side, will stop HCTZ  - clinically compensated.     Falls frequently  Frail elderly  - stable now, likely meds GDR helping.   - Significant  Deficits requiring NH placement. Requiring extensive assistance from nursing. 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 Chronic condition, ongoing decline expected.   -  Continue to provide redirection and reassurance as needed. Maintain safe living situation with goals focused on comfort.    Orders:  1. D/C HCTZ, otherwise continue the rest of the current plan.     Total time spent with patient visit at the skilled nursing facility was 35 min including patient visit and review of past records. Greater than 50% of total time spent with counseling and coordinating care due to above comorbidities.     Electronically signed by:  Aquiles Hernandez MD        Sincerely,        Aquiles Hernandez MD

## 2018-04-05 ENCOUNTER — NURSING HOME VISIT (OUTPATIENT)
Dept: GERIATRICS | Facility: CLINIC | Age: 83
End: 2018-04-05
Payer: COMMERCIAL

## 2018-04-05 VITALS
WEIGHT: 160 LBS | TEMPERATURE: 97.9 F | BODY MASS INDEX: 28.35 KG/M2 | HEART RATE: 80 BPM | DIASTOLIC BLOOD PRESSURE: 76 MMHG | SYSTOLIC BLOOD PRESSURE: 131 MMHG | OXYGEN SATURATION: 94 % | RESPIRATION RATE: 20 BRPM | HEIGHT: 63 IN

## 2018-04-05 DIAGNOSIS — F02.818 ALZHEIMER'S DISEASE OF OTHER ONSET WITH BEHAVIORAL DISTURBANCE: ICD-10-CM

## 2018-04-05 DIAGNOSIS — G30.8 ALZHEIMER'S DISEASE OF OTHER ONSET WITH BEHAVIORAL DISTURBANCE: ICD-10-CM

## 2018-04-05 DIAGNOSIS — H91.93 BILATERAL HEARING LOSS, UNSPECIFIED HEARING LOSS TYPE: ICD-10-CM

## 2018-04-05 DIAGNOSIS — F32.0 MILD SINGLE CURRENT EPISODE OF MAJOR DEPRESSIVE DISORDER (H): Primary | ICD-10-CM

## 2018-04-05 DIAGNOSIS — I10 HTN, GOAL BELOW 150/90: ICD-10-CM

## 2018-04-05 PROBLEM — Z00.00 ROUTINE GENERAL MEDICAL EXAMINATION AT A HEALTH CARE FACILITY: Status: ACTIVE | Noted: 2018-04-05

## 2018-04-05 PROCEDURE — 99318 ZZC ANNUAL NURSING FAC ASSESSMNT, STABLE: CPT | Performed by: NURSE PRACTITIONER

## 2018-04-05 NOTE — PROGRESS NOTES
[facility]  Chief Complaint   Patient presents with     Annual Comprehensive Nursing Home       HPI:    Sherri Bender is a 85 year old  (6/6/1932), who is being seen today [facility]  HPI information obtained from: facility chart records, facility staff, patient report and [facility] chart review.  Today's concerns are:     Mild single current episode of major depressive disorder (H)  Bilateral hearing loss, unspecified hearing loss type  Alzheimer's disease of other onset with behavioral disturbance  HTN, goal below 150/90         ALLERGIES: Nkda [no known drug allergies]  PROBLEM LIST:  Patient Active Problem List   Diagnosis     Osteopenia     Varicose vein of leg     Loss of height     Family history of ischemic heart disease     S/P lumpectomy of breast     CARDIOVASCULAR SCREENING; LDL GOAL LESS THAN 160     [place] (hard of hearing)     Anxiety     Advanced directives, counseling/discussion     Mild major depression (H)     HTN, goal below 150/90     Dementia     Agitation      ANNUAL 4/5/18     PAST MEDICAL HISTORY:  has a past medical history of Dementia; Depression; Environmental allergies; HTN (hypertension); and Osteoarthritis.  PAST SURGICAL HISTORY:  has a past surgical history that includes appendectomy; surgical history of - ; surgical history of - ; tonsillectomy; cataract iol, rt/lt (4/2010); and Colonoscopy (6/20/2011).  FAMILY HISTORY: family history includes Allergies in her brother; CANCER in her brother and mother; HEART DISEASE in her father and mother.  SOCIAL HISTORY:  reports that she has quit smoking. She has never used smokeless tobacco. She reports that she does not drink alcohol or use illicit drugs.  IMMUNIZATIONS:  Most Recent Immunizations   Administered Date(s) Administered     Influenza (High Dose) 3 valent vaccine 10/22/2015     Influenza (IIV3) PF 10/23/2014     Pneumo Conj 13-V (2010&after)  10/27/2015     Pneumococcal 23 valent 11/21/2009     TD (ADULT, 7+) 11/20/2009     TDAP Vaccine (Adacel) 01/01/1990     Zoster vaccine, live 06/16/2014     Above immunizations pulled from [facility]. [facility] and facility records also reconciled. Outstanding information sent to  to update [facility]  .  Future immunizations needed:  yearly influenza per facility protocol  MEDICATIONS:  Current Outpatient Prescriptions   Medication Sig Dispense Refill     bisacodyl (DULCOLAX) 10 MG Suppository Place 10 mg rectally daily as needed for constipation       senna-docusate (SENOKOT-S;PERICOLACE) 8.6-50 MG per tablet Take 1 tablet by mouth 2 times daily       TRAMADOL HCL PO Take 25 mg by mouth 2 times daily And q3 hours PRN       hydrochlorothiazide (MICROZIDE) 12.5 MG capsule Take 12.5 mg by mouth daily       Acetaminophen (TYLENOL PO) Take 1,000 mg by mouth 3 times daily       Donepezil HCl (ARICEPT PO) Take 5 mg by mouth At Bedtime       Sertraline HCl (ZOLOFT PO) Take by mouth daily 75mg PO daily       TRAZODONE HCL PO Take 25 mg by mouth 2 times daily as needed for sleep       CRANBERRY PO Take 250 mg by mouth 3 times daily        MELATONIN PO Take 10 mg by mouth At Bedtime       loratadine (CLARITIN) 10 MG tablet Take 10 mg by mouth daily       losartan (COZAAR) 25 MG tablet Take 1 tablet (25 mg) by mouth daily (Needs follow-up appointment for this medication) 30 tablet 0     ASPIRIN CAPS 81 MG OR 1 TABLET DAILY       Medications reviewed:  Medications reconciled to facility chart and changes were made to reflect current medications as identified as above med list. Below are the changes that were made:   Medications stopped since last EPIC medication reconciliation:   There are no discontinued medications.    Medications started since last [facility] medication reconciliation:  No orders of the defined types were placed in this encounter.       Case Management:  I have reviewed the facility/SNF care  "plan/MDS which was done 4/5, including the falls risk, nutrition and pain screening. I also reviewed the current immunizations, and preventive care..Future cancer screening is not clinically indicated secondary to age/goals of care Patient's desire to return to the community is not assessible due to cognitive impairment. Current Level of Care is appropriate.  Advance Directive Discussion:    I reviewed the current advanced directives as reflected in EPIC, the POLST and the facility chart, and verified the congruency of orders  . I contacted the first party abdelrahman quinones and left a message regarding the plan of Care.  I did not due to cognitive impairment review the advance directives with the resident.     Team Discussion:  I communicated with the appropriate disciplines involved with the Plan of Care:   Nursing      Patient Goal:  Patient's goal is pain control and comfort.    Information reviewed:  Medications, vital signs, orders, and nursing notes.    ROS:  Unobtainable secondary to cognitive impairment.     Exam:  /76  Pulse 80  Temp 97.9  F (36.6  C)  Resp 20  Ht 5' 3\" (1.6 m)  Wt 160 lb (72.6 kg)  SpO2 94%  BMI 28.34 kg/m2   GENERAL APPEARANCE:  Alert, in no distress  ENT:  [place], oral mucous membranes moist  EYES:  EOM normal, conjunctiva and lids normal  RESP:  lungs clear to auscultation , no respiratory distress, diminished breath sounds bases  CV:  Palpation and auscultation of heart done , regular rate and rhythm, no murmur, rub, or gallop, no edema  ABDOMEN:  bowel sounds normal, soft, non-tender  M/S:   Gait and station abnormal up in w/c  frail HARTMANN  SKIN:  Pale w/d  PSYCH:  oriented to self, insight and judgement impaired, memory impaired , affect and mood normal    Lab/Diagnostic data:       CBC RESULTS:   Recent Labs   Lab Test  01/14/18   1343  08/26/13   1841   WBC  8.2  7.7   RBC  3.79*  4.21   HGB  11.7  12.8   HCT  35.3  39.3   MCV  93  93   MCH  30.9  30.4   MCHC  33.1  32.6 "   RDW  13.0  13.2   PLT  226  239       Last Basic Metabolic Panel:  Recent Labs   Lab Test  01/14/18   1343  09/03/15   1659   NA  140  137   POTASSIUM  3.6  3.6   CHLORIDE  106  100   AZCK  8.6  9.3   CO2  24  30   BUN  17  16   CR  0.82  0.65   GLC  142*  86       Liver Function Studies -   Recent Labs   Lab Test  01/14/18   1343  09/03/15   1659   PROTTOTAL  6.6*  7.5   ALBUMIN  3.3*  3.9   BILITOTAL  0.3  0.4   ALKPHOS  67  63   AST  18  17   ALT  19  16       TSH   Date Value Ref Range Status   01/25/2018 2.00 0.40 - 4.00 mU/L Final   09/03/2015 1.79 0.40 - 4.00 mU/L Final   ]    Lab Results   Component Value Date    A1C 5.8 02/16/2012    A1C 5.9 07/19/2011         ASSESSMENT/PLAN    Mild single current episode of major depressive disorder (H)  Improved mood and comfort with additon of zoloft.  No reports of tearfulness or behaviors   Mood and behavior improved with current medication therapy.  No adverse side effects reported.  Risk of decompensation exceeds potential benefit of GDR.   Benefit of continued antidepressant therapy outweighs risk.  Discontinuation of treatment may result in refusal of cares, food/medications and increased sadness.  Recommend continue same.    Bilateral hearing loss, unspecified hearing loss type   Limits communication.  Able to participate with conversation with quiet environment and 1:1 attention.  Alzheimer's disease of other onset with behavioral disturbance  With physical and functional decline  Significant deficits requiring NH placement  Resides in dementia armstrong  Goal is to emphasize comfort with conservative treatment in NH  HTN, goal below 150/90  Based on JNC-8 goals,  patients age of 85 year old, no presence of diabetes or CKD, and goals of care goal BP is <150/90 mm Hg. Patient is stable with current plan of care and routine assessment..    Orders:  The current medical regimen is effective;  continue present plan and medications.    Electronically signed by:  Esme  VALERIE Morataya CNP

## 2018-04-10 ENCOUNTER — NURSING HOME VISIT (OUTPATIENT)
Dept: GERIATRICS | Facility: CLINIC | Age: 83
End: 2018-04-10
Payer: COMMERCIAL

## 2018-04-10 DIAGNOSIS — G47.09 OTHER INSOMNIA: ICD-10-CM

## 2018-04-10 DIAGNOSIS — H91.93 BILATERAL HEARING LOSS, UNSPECIFIED HEARING LOSS TYPE: ICD-10-CM

## 2018-04-10 DIAGNOSIS — F32.0 MILD SINGLE CURRENT EPISODE OF MAJOR DEPRESSIVE DISORDER (H): Primary | ICD-10-CM

## 2018-04-10 DIAGNOSIS — G30.8 ALZHEIMER'S DISEASE OF OTHER ONSET WITH BEHAVIORAL DISTURBANCE: ICD-10-CM

## 2018-04-10 DIAGNOSIS — F02.818 ALZHEIMER'S DISEASE OF OTHER ONSET WITH BEHAVIORAL DISTURBANCE: ICD-10-CM

## 2018-04-10 PROCEDURE — 99309 SBSQ NF CARE MODERATE MDM 30: CPT | Performed by: NURSE PRACTITIONER

## 2018-04-10 RX ORDER — POLYETHYLENE GLYCOL 3350 17 G/17G
17 POWDER, FOR SOLUTION ORAL DAILY
COMMUNITY

## 2018-04-11 NOTE — PROGRESS NOTES
[facility]    Chief Complaint   Patient presents with     custodial Regulatory       HPI:    Sherri Bender is a 85 year old  (6/6/1932), who is being seen today for a federally mandated E/M visit at [facility] .  HPI information obtained from: facility chart records, facility staff, patient report and [facility] chart review. Today's concerns are:     Mild single current episode of major depressive disorder (H)  Alzheimer's disease of other onset with behavioral disturbance  Other insomnia  Bilateral hearing loss, unspecified hearing loss type      ALLERGIES: Nkda [no known drug allergies]  PAST MEDICAL HISTORY:  has a past medical history of Dementia; Depression; Environmental allergies; HTN (hypertension); and Osteoarthritis.  PAST SURGICAL HISTORY:  has a past surgical history that includes appendectomy; surgical history of - ; surgical history of - ; tonsillectomy; cataract iol, rt/lt (4/2010); and Colonoscopy (6/20/2011).  FAMILY HISTORY: family history includes Allergies in her brother; CANCER in her brother and mother; HEART DISEASE in her father and mother.  SOCIAL HISTORY:  reports that she has quit smoking. She has never used smokeless tobacco. She reports that she does not drink alcohol or use illicit drugs.    MEDICATIONS:  Current Outpatient Prescriptions   Medication Sig Dispense Refill     polyethylene glycol (MIRALAX/GLYCOLAX) Packet Take 17 g by mouth daily       bisacodyl (DULCOLAX) 10 MG Suppository Place 10 mg rectally daily as needed for constipation       senna-docusate (SENOKOT-S;PERICOLACE) 8.6-50 MG per tablet Take 1 tablet by mouth 2 times daily       TRAMADOL HCL PO Take 25 mg by mouth 2 times daily And q3 hours PRN       hydrochlorothiazide (MICROZIDE) 12.5 MG capsule Take 12.5 mg by mouth daily       Acetaminophen (TYLENOL PO) Take 1,000 mg by mouth 3 times daily       Donepezil HCl (ARICEPT PO) Take 5 mg by mouth At Bedtime        Sertraline HCl (ZOLOFT PO) Take by mouth daily 75mg PO daily       TRAZODONE HCL PO Take 25 mg by mouth 2 times daily as needed for sleep       CRANBERRY PO Take 250 mg by mouth 3 times daily        MELATONIN PO Take 10 mg by mouth At Bedtime       loratadine (CLARITIN) 10 MG tablet Take 10 mg by mouth daily       losartan (COZAAR) 25 MG tablet Take 1 tablet (25 mg) by mouth daily (Needs follow-up appointment for this medication) 30 tablet 0     ASPIRIN CAPS 81 MG OR 1 TABLET DAILY       Medications reviewed:  Medications reconciled to facility chart and changes were made to reflect current medications as identified as above med list. Below are the changes that were made:   Medications stopped since last EPIC medication reconciliation:   There are no discontinued medications.    Medications started since last [facility] medication reconciliation:  Orders Placed This Encounter   Medications     polyethylene glycol (MIRALAX/GLYCOLAX) Packet     Sig: Take 17 g by mouth daily        Case Management:  I have reviewed the care plan and MDS and do agree with the plan. Patient's desire to return to the community is not assessible due to cognitive impairment.  Information reviewed:  Medications, vital signs, orders, and nursing notes.    ROS:  Unobtainable secondary to cognitive impairment.     Exam:  Vitals: There were no vitals taken for this visit.  BMI= There is no height or weight on file to calculate BMI.  GENERAL APPEARANCE:  Alert, in no distress  ENT:  [place], oral mucous membranes moist  EYES:  EOM normal, conjunctiva and lids normal  RESP:  lungs clear to auscultation , no respiratory distress, diminished breath sounds bases  CV:  Palpation and auscultation of heart done , regular rate and rhythm, no murmur, rub, or gallop, no edema  ABDOMEN:  bowel sounds normal, soft, non-tender  M/S:   Gait and station abnormal up in w/c  frail HARTMANN  SKIN:  Pale w/d  PSYCH:  oriented to self, insight and judgement impaired, memory  impaired , affect and mood normal    Lab/Diagnostic data:      CBC RESULTS:   Recent Labs   Lab Test  01/14/18   1343  08/26/13   1841   WBC  8.2  7.7   RBC  3.79*  4.21   HGB  11.7  12.8   HCT  35.3  39.3   MCV  93  93   MCH  30.9  30.4   MCHC  33.1  32.6   RDW  13.0  13.2   PLT  226  239       Last Basic Metabolic Panel:  Recent Labs   Lab Test  01/14/18   1343  09/03/15   1659   NA  140  137   POTASSIUM  3.6  3.6   CHLORIDE  106  100   ZCAK  8.6  9.3   CO2  24  30   BUN  17  16   CR  0.82  0.65   GLC  142*  86       Liver Function Studies -   Recent Labs   Lab Test  01/14/18   1343  09/03/15   1659   PROTTOTAL  6.6*  7.5   ALBUMIN  3.3*  3.9   BILITOTAL  0.3  0.4   ALKPHOS  67  63   AST  18  17   ALT  19  16       TSH   Date Value Ref Range Status   01/25/2018 2.00 0.40 - 4.00 mU/L Final   09/03/2015 1.79 0.40 - 4.00 mU/L Final   ]    Lab Results   Component Value Date    A1C 5.8 02/16/2012    A1C 5.9 07/19/2011       ASSESSMENT/PLAN  Mild single current episode of major depressive disorder (H)  Improved mood and comfort with additon of zoloft.  No reports of tearfulness or behaviors   Mood and behavior improved with current medication therapy.  No adverse side effects reported.  Risk of decompensation exceeds potential benefit of GDR.   Benefit of continued antidepressant therapy outweighs risk.  Discontinuation of treatment may result in refusal of cares, food/medications and increased sadness.  Recommend continue same.  Bilateral hearing loss, unspecified hearing loss type   Limits communication.  Able to participate with conversation with quiet environment and 1:1 attention.  Alzheimer's disease of other onset with behavioral disturbance  With physical and functional decline  Significant deficits requiring NH placement  Resides in dementia armstrong  Goal is to emphasize comfort with conservative treatment in NH  Insomnia   Nursing reports patient not sleeping at night and is up wandering in room.  Has prn  trazodone but usually doesn't get the medication in time for a good night sleep    Orders:  D/c prn trazodone  Trazodone 50 mg po q hs  The current medical regimen is effective;  continue present plan and medications.      Total time spent with patient visit at the skilled nursing facility was 25 min including patient visit and review of past records. Greater than 50% of total time spent with counseling and coordinating care due to reg visit    Electronically signed by:  VALERIE Reeder CNP

## 2018-06-11 ENCOUNTER — NURSING HOME VISIT (OUTPATIENT)
Dept: GERIATRICS | Facility: CLINIC | Age: 83
End: 2018-06-11
Payer: COMMERCIAL

## 2018-06-11 VITALS
TEMPERATURE: 97.3 F | RESPIRATION RATE: 18 BRPM | DIASTOLIC BLOOD PRESSURE: 69 MMHG | SYSTOLIC BLOOD PRESSURE: 98 MMHG | BODY MASS INDEX: 29.58 KG/M2 | OXYGEN SATURATION: 95 % | HEART RATE: 85 BPM | WEIGHT: 167 LBS

## 2018-06-11 DIAGNOSIS — G30.8 ALZHEIMER'S DISEASE OF OTHER ONSET WITH BEHAVIORAL DISTURBANCE: ICD-10-CM

## 2018-06-11 DIAGNOSIS — F22 PARANOIA (H): Primary | ICD-10-CM

## 2018-06-11 DIAGNOSIS — R54 FRAIL ELDERLY: ICD-10-CM

## 2018-06-11 DIAGNOSIS — J30.2 CHRONIC SEASONAL ALLERGIC RHINITIS, UNSPECIFIED TRIGGER: ICD-10-CM

## 2018-06-11 DIAGNOSIS — I10 HTN, GOAL BELOW 150/90: ICD-10-CM

## 2018-06-11 DIAGNOSIS — F02.818 ALZHEIMER'S DISEASE OF OTHER ONSET WITH BEHAVIORAL DISTURBANCE: ICD-10-CM

## 2018-06-11 DIAGNOSIS — I50.20 SYSTOLIC CONGESTIVE HEART FAILURE, UNSPECIFIED CONGESTIVE HEART FAILURE CHRONICITY: ICD-10-CM

## 2018-06-11 DIAGNOSIS — F32.0 MILD SINGLE CURRENT EPISODE OF MAJOR DEPRESSIVE DISORDER (H): ICD-10-CM

## 2018-06-11 PROCEDURE — 99310 SBSQ NF CARE HIGH MDM 45: CPT | Performed by: FAMILY MEDICINE

## 2018-06-11 NOTE — LETTER
6/11/2018        RE: Sherri Bender  [address] [place] [address]          [facility]    Chief Complaint   Patient presents with     assisted Regulatory       [place] Medical Record Number:  [id number]    HPI:    Sherri Bender is a 86 year old  (6/6/1932), who is being seen today for a federally mandated E/M visit at [facility] .  HPI information obtained from: facility chart records, facility staff, patient report and [facility] chart review.     Today's concerns are:  - - Resident seen and examined.   - Reports sleep, appetite and BM are fine.   - RN / GNP has no concern today.   -----------------------------------------  - - Past Medical, social, family histories, medications, and allergies reviewed and updated  - Medications reviewed: in the chart and EHR.   - Case Management:   I have reviewed the care plan and MDS and do agree with the plan. Patient's desire to return to the community is not present.  Information reviewed:  Medications, vital signs, orders, and nursing notes.    MEDICATIONS:  Current Outpatient Prescriptions   Medication Sig Dispense Refill     Acetaminophen (TYLENOL PO) Take 1,000 mg by mouth 3 times daily       ASPIRIN CAPS 81 MG OR 1 TABLET DAILY       bisacodyl (DULCOLAX) 10 MG Suppository Place 10 mg rectally daily as needed for constipation       CRANBERRY PO Take 250 mg by mouth 3 times daily        Donepezil HCl (ARICEPT PO) Take 5 mg by mouth At Bedtime       hydrochlorothiazide (MICROZIDE) 12.5 MG capsule Take 12.5 mg by mouth daily       loratadine (CLARITIN) 10 MG tablet Take 10 mg by mouth daily       losartan (COZAAR) 25 MG tablet Take 1 tablet (25 mg) by mouth daily (Needs follow-up appointment for this medication) 30 tablet 0     MELATONIN PO Take 10 mg by mouth At Bedtime       polyethylene glycol (MIRALAX/GLYCOLAX) Packet Take 17 g by mouth daily       senna-docusate (SENOKOT-S;PERICOLACE) 8.6-50 MG  per tablet Take 1 tablet by mouth 2 times daily       Sertraline HCl (ZOLOFT PO) Take by mouth daily 75mg PO daily       TRAMADOL HCL PO Take 25 mg by mouth 2 times daily And q3 hours PRN       TRAZODONE HCL PO Take 25 mg by mouth 2 times daily as needed for sleep       ROS:  - Limited secondary to cognitive impairment but otherwise negative except as mentioned in the HPI    Exam:  Vitals: BP 98/69  Pulse 85  Temp 97.3  F (36.3  C)  Resp 18  Wt 167 lb (75.8 kg)  SpO2 95%  BMI 29.58 kg/m2  BMI= Body mass index is 29.58 kg/(m^2).  GENERAL APPEARANCE:  in no distress  RESP:  respiratory effort and palpation of chest normal, lungs clear to auscultation , no respiratory distress  CV:  Palpation and auscultation of heart done , regular rate and rhythm, no murmur, rub, or gallop, no edema  ABDOMEN:  normal bowel sounds, soft, nontender, no hepatosplenomegaly or other masses  M/S:   Gait and station abnormal uses walker and a WC.   SKIN:  Inspection of skin and subcutaneous tissue baseline, Palpation of skin and subcutaneous tissue baseline  NEURO:   no NFD appreciated on observation  PSYCH:  oriented to person only, otherwise pleasantly confused, mood and affect fine.     Lab/Diagnostic data: All labs reviewed, none recent.    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 Continue losartan, check VS daily in am for 5 days, and report no NP. Keep SBP above 130 in this frail elderly with limited life expectancy. May liberalized SBP up to 160 mmHg as long as Resident is asymptomatic.     Seasonal Rhinitis:  - on Claritin scheduled, will attempt drug  holiday, and monitor.    Falls frequently  Frail elderly  - stable now, likely meds GDR helping.   - Significant  Deficits requiring NH placement. Requiring extensive assistance from nursing. Up for meals only o/w spends the day resting in bed     Alzheimer's disease of other onset with behavioral disturbance  - on donepezil. Has severe dementia. Recommend GDR.   - Continue to anticipate needs. Chronic condition, ongoing decline expected.   -  Continue to provide redirection and reassurance as needed. Maintain safe living situation with goals focused on comfort.    Orders:  - See above, otherwise, continue the rest of the current POC.     Electronically signed by:  Aquiles Hernandez MD        Sincerely,        Aquiles Hernandez MD

## 2018-06-11 NOTE — PROGRESS NOTES
[facility]    Chief Complaint   Patient presents with     half-way Regulatory       [place] Medical Record Number:  [id number]    HPI:    Sherri Bender is a 86 year old  (6/6/1932), who is being seen today for a federally mandated E/M visit at [facility] .  HPI information obtained from: facility chart records, facility staff, patient report and [facility] chart review.     Today's concerns are:  - - Resident seen and examined.   - Reports sleep, appetite and BM are fine.   - RN / GNP has no concern today.   -----------------------------------------  - - Past Medical, social, family histories, medications, and allergies reviewed and updated  - Medications reviewed: in the chart and EHR.   - Case Management:   I have reviewed the care plan and MDS and do agree with the plan. Patient's desire to return to the community is not present.  Information reviewed:  Medications, vital signs, orders, and nursing notes.    MEDICATIONS:  Current Outpatient Prescriptions   Medication Sig Dispense Refill     Acetaminophen (TYLENOL PO) Take 1,000 mg by mouth 3 times daily       ASPIRIN CAPS 81 MG OR 1 TABLET DAILY       bisacodyl (DULCOLAX) 10 MG Suppository Place 10 mg rectally daily as needed for constipation       CRANBERRY PO Take 250 mg by mouth 3 times daily        Donepezil HCl (ARICEPT PO) Take 5 mg by mouth At Bedtime       hydrochlorothiazide (MICROZIDE) 12.5 MG capsule Take 12.5 mg by mouth daily       loratadine (CLARITIN) 10 MG tablet Take 10 mg by mouth daily       losartan (COZAAR) 25 MG tablet Take 1 tablet (25 mg) by mouth daily (Needs follow-up appointment for this medication) 30 tablet 0     MELATONIN PO Take 10 mg by mouth At Bedtime       polyethylene glycol (MIRALAX/GLYCOLAX) Packet Take 17 g by mouth daily       senna-docusate (SENOKOT-S;PERICOLACE) 8.6-50 MG per tablet Take 1 tablet by mouth 2 times daily       Sertraline HCl (ZOLOFT PO) Take by mouth  daily 75mg PO daily       TRAMADOL HCL PO Take 25 mg by mouth 2 times daily And q3 hours PRN       TRAZODONE HCL PO Take 25 mg by mouth 2 times daily as needed for sleep       ROS:  - Limited secondary to cognitive impairment but otherwise negative except as mentioned in the HPI    Exam:  Vitals: BP 98/69  Pulse 85  Temp 97.3  F (36.3  C)  Resp 18  Wt 167 lb (75.8 kg)  SpO2 95%  BMI 29.58 kg/m2  BMI= Body mass index is 29.58 kg/(m^2).  GENERAL APPEARANCE:  in no distress  RESP:  respiratory effort and palpation of chest normal, lungs clear to auscultation , no respiratory distress  CV:  Palpation and auscultation of heart done , regular rate and rhythm, no murmur, rub, or gallop, no edema  ABDOMEN:  normal bowel sounds, soft, nontender, no hepatosplenomegaly or other masses  M/S:   Gait and station abnormal uses walker and a WC.   SKIN:  Inspection of skin and subcutaneous tissue baseline, Palpation of skin and subcutaneous tissue baseline  NEURO:   no NFD appreciated on observation  PSYCH:  oriented to person only, otherwise pleasantly confused, mood and affect fine.     Lab/Diagnostic data: All labs reviewed, none recent.    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 Continue losartan, check VS daily in am for 5 days, and report no NP. Keep SBP above 130 in this frail elderly with limited life expectancy. May liberalized SBP up to 160 mmHg as long as Resident is asymptomatic.     Seasonal Rhinitis:  - on Claritin scheduled, will attempt drug holiday, and monitor.    Falls frequently  Frail elderly  - stable now, likely meds GDR helping.   -  Significant  Deficits requiring NH placement. Requiring extensive assistance from nursing. Up for meals only o/w spends the day resting in bed     Alzheimer's disease of other onset with behavioral disturbance  - on donepezil. Has severe dementia. Recommend GDR.   - Continue to anticipate needs. Chronic condition, ongoing decline expected.   -  Continue to provide redirection and reassurance as needed. Maintain safe living situation with goals focused on comfort.    Orders:  - See above, otherwise, continue the rest of the current POC.     Electronically signed by:  Aquiles Hernandez MD

## 2018-08-01 VITALS
RESPIRATION RATE: 19 BRPM | OXYGEN SATURATION: 97 % | HEART RATE: 80 BPM | DIASTOLIC BLOOD PRESSURE: 72 MMHG | TEMPERATURE: 98.5 F | BODY MASS INDEX: 30.29 KG/M2 | SYSTOLIC BLOOD PRESSURE: 113 MMHG | WEIGHT: 171 LBS

## 2018-08-01 NOTE — PROGRESS NOTES
[facility]    Chief Complaint   Patient presents with     MCFP Regulatory       [place] Medical Record Number:  [id number]    HPI:    Sherri Bender is a 86 year old  (6/6/1932), who is being seen today for a federally mandated E/M visit at [facility] .  HPI information obtained from: facility chart records, facility staff, patient report and [facility] chart review.     Today's concerns are:  Essential hypertension  - no report of chest pain or dizzy spells    Late onset Alzheimer's disease without behavioral disturbance  - Doing well in . No reports of hallucinations or behaviors  - Staff report increased confusion in the morning, note wording finding difficulty and difficulty following commands. This usually clears by noon    Anxiety  - no reports of anxiety    Bilateral hearing loss, unspecified hearing loss type  - wearing hearing aids. Can converse well     Other insomnia  - no reported concerns    Slow transit constipation  - no reported concerns      ALLERGIES: Nkda [no known drug allergies]  PAST MEDICAL HISTORY:  has a past medical history of Dementia; Depression; Environmental allergies; Family history of ischemic heart disease (2/9/2010); HTN (hypertension); and Osteoarthritis.  PAST SURGICAL HISTORY:  has a past surgical history that includes appendectomy; surgical history of - ; surgical history of - ; tonsillectomy; cataract iol, rt/lt (4/2010); and Colonoscopy (6/20/2011).  FAMILY HISTORY: family history includes Allergies in her brother; Cancer in her brother and mother; HEART DISEASE in her father and mother.  SOCIAL HISTORY:  reports that she has quit smoking. She has never used smokeless tobacco. She reports that she does not drink alcohol or use illicit drugs.    MEDICATIONS:  Current Outpatient Prescriptions   Medication Sig Dispense Refill     Acetaminophen (TYLENOL PO) Take 1,000 mg by mouth 3 times daily       ASPIRIN CAPS 81 MG OR 1  TABLET DAILY       bisacodyl (DULCOLAX) 10 MG Suppository Place 10 mg rectally daily as needed for constipation       CRANBERRY PO Take 250 mg by mouth 3 times daily        losartan (COZAAR) 25 MG tablet Take 1 tablet (25 mg) by mouth daily (Needs follow-up appointment for this medication) 30 tablet 0     MELATONIN PO Take 10 mg by mouth At Bedtime       polyethylene glycol (MIRALAX/GLYCOLAX) Packet Take 17 g by mouth daily       senna-docusate (SENOKOT-S;PERICOLACE) 8.6-50 MG per tablet Take 1 tablet by mouth 2 times daily       Sertraline HCl (ZOLOFT PO) Take by mouth daily 75mg PO daily       TRAMADOL HCL PO Take 25 mg by mouth 2 times daily And q3 hours PRN       TRAZODONE HCL PO Take 50 mg by mouth At Bedtime        Medications reviewed:  Medications reconciled to facility chart and changes were made to reflect current medications as identified as above med list. Below are the changes that were made:   Medications stopped since last EPIC medication reconciliation:   There are no discontinued medications.    Medications started since last [facility] medication reconciliation:  No orders of the defined types were placed in this encounter.        Case Management:  I have reviewed the care plan and MDS and do agree with the plan. Patient's desire to return to the community is not assessible due to cognitive impairment.  Information reviewed:  Medications, vital signs, orders, and nursing notes.    ROS:  10 point ROS of systems including Constitutional, Eyes, Respiratory, Cardiovascular, Gastroenterology, Genitourinary, Integumentary, Muscularskeletal, Psychiatric were all negative except for pertinent positives noted in my HPI.    Exam:  Vitals: /72  Pulse 80  Temp 98.5  F (36.9  C)  Resp 19  Wt 171 lb (77.6 kg)  SpO2 97%  BMI 30.29 kg/m2  BMI= Body mass index is 30.29 kg/(m^2).  GENERAL APPEARANCE:  Alert, in no distress  RESP:  respiratory effort and palpation of chest normal, auscultation of lungs clear ,  no respiratory distress  CV:  Palpation and auscultation of heart done , rate and rhythm regular, no murmur, no peripheral edema  ABDOMEN:  normal bowel sounds, soft, nontender, no hepatosplenomegaly or other masses  M/S:   Gait and station in wheelchair, Digits and nails normal  SKIN:  Inspection and Palpation of skin and subcutaneous tissue normal  NEURO: 2-12 in normal limits and at patient's baseline  PSYCH:  insight and judgement, memory poor , affect and mood normal      Lab/Diagnostic data:     CBC RESULTS:   Recent Labs   Lab Test  01/14/18   1343  08/26/13   1841   WBC  8.2  7.7   RBC  3.79*  4.21   HGB  11.7  12.8   HCT  35.3  39.3   MCV  93  93   MCH  30.9  30.4   MCHC  33.1  32.6   RDW  13.0  13.2   PLT  226  239       Last Basic Metabolic Panel:  Recent Labs   Lab Test  01/14/18   1343  09/03/15   1659   NA  140  137   POTASSIUM  3.6  3.6   CHLORIDE  106  100   ZACK  8.6  9.3   CO2  24  30   BUN  17  16   CR  0.82  0.65   GLC  142*  86       Liver Function Studies -   Recent Labs   Lab Test  01/14/18   1343  09/03/15   1659   PROTTOTAL  6.6*  7.5   ALBUMIN  3.3*  3.9   BILITOTAL  0.3  0.4   ALKPHOS  67  63   AST  18  17   ALT  19  16       TSH   Date Value Ref Range Status   01/25/2018 2.00 0.40 - 4.00 mU/L Final   09/03/2015 1.79 0.40 - 4.00 mU/L Final   ]    Lab Results   Component Value Date    A1C 5.8 02/16/2012    A1C 5.9 07/19/2011       ASSESSMENT/PLAN  Essential hypertension  -   - Munguia top losartan 25 mg. Nsg to update is SBP >170  - continue ASA for now    Late onset Alzheimer's disease without behavioral disturbance  - needing assistance with ADL's. Increased confusion in   AM. May be r/t hypotension.   - Will check labs    Anxiety  - stable on 75 mg zoloft. Consider GDR if remains stable.     Bilateral hearing loss, unspecified hearing loss type  - hearing well with bilateral hearing aids    Other insomnia  - chronic problem. Continue trazodone and melatonin    Slow transit constipation  -  stable on mirlalax daily      Orders:  1. DC losartan- check BP every day x 3 days and call NP if SBP >170.  2. CBC, CMP, TSH, Folate and B12 Dx Dementia    Total time spent with patient visit at the skilled nursing facility was 38 including patient visit, review of past records and discussion with staff. Greater than 50% of total time spent with counseling and coordinating care due to complex conditions    Electronically signed by:  VALEIRE Singh CNP

## 2018-08-02 ENCOUNTER — NURSING HOME VISIT (OUTPATIENT)
Dept: GERIATRICS | Facility: CLINIC | Age: 83
End: 2018-08-02
Payer: COMMERCIAL

## 2018-08-02 DIAGNOSIS — G47.09 OTHER INSOMNIA: ICD-10-CM

## 2018-08-02 DIAGNOSIS — H91.93 BILATERAL HEARING LOSS, UNSPECIFIED HEARING LOSS TYPE: ICD-10-CM

## 2018-08-02 DIAGNOSIS — I10 ESSENTIAL HYPERTENSION: Primary | ICD-10-CM

## 2018-08-02 DIAGNOSIS — F41.9 ANXIETY: ICD-10-CM

## 2018-08-02 DIAGNOSIS — F02.80 LATE ONSET ALZHEIMER'S DISEASE WITHOUT BEHAVIORAL DISTURBANCE (H): ICD-10-CM

## 2018-08-02 DIAGNOSIS — G30.1 LATE ONSET ALZHEIMER'S DISEASE WITHOUT BEHAVIORAL DISTURBANCE (H): ICD-10-CM

## 2018-08-02 DIAGNOSIS — K59.01 SLOW TRANSIT CONSTIPATION: ICD-10-CM

## 2018-08-02 PROBLEM — Z00.00 ROUTINE GENERAL MEDICAL EXAMINATION AT A HEALTH CARE FACILITY: Status: RESOLVED | Noted: 2018-04-05 | Resolved: 2018-08-02

## 2018-08-02 PROBLEM — R45.1 AGITATION: Status: RESOLVED | Noted: 2018-01-16 | Resolved: 2018-08-02

## 2018-08-02 PROCEDURE — 99310 SBSQ NF CARE HIGH MDM 45: CPT | Performed by: NURSE PRACTITIONER

## 2018-08-02 NOTE — LETTER
8/2/2018        RE: Sherri Bender  [address] [place] [address]          [facility]    Chief Complaint   Patient presents with     residential Regulatory       [place] Medical Record Number:  [id number]    HPI:    Sehrri Bender is a 86 year old  (6/6/1932), who is being seen today for a federally mandated E/M visit at [facility] .  HPI information obtained from: facility chart records, facility staff, patient report and [facility] chart review.     Today's concerns are:  Essential hypertension  - no report of chest pain or dizzy spells    Late onset Alzheimer's disease without behavioral disturbance  - Doing well in . No reports of hallucinations or behaviors  - Staff report increased confusion in the morning, note wording finding difficulty and difficulty following commands. This usually clears by noon    Anxiety  - no reports of anxiety    Bilateral hearing loss, unspecified hearing loss type  - wearing hearing aids. Can converse well     Other insomnia  - no reported concerns    Slow transit constipation  - no reported concerns      ALLERGIES: Nkda [no known drug allergies]  PAST MEDICAL HISTORY:  has a past medical history of Dementia; Depression; Environmental allergies; Family history of ischemic heart disease (2/9/2010); HTN (hypertension); and Osteoarthritis.  PAST SURGICAL HISTORY:  has a past surgical history that includes appendectomy; surgical history of - ; surgical history of - ; tonsillectomy; cataract iol, rt/lt (4/2010); and Colonoscopy (6/20/2011).  FAMILY HISTORY: family history includes Allergies in her brother; Cancer in her brother and mother; HEART DISEASE in her father and mother.  SOCIAL HISTORY:  reports that she has quit smoking. She has never used smokeless tobacco. She reports that she does not drink alcohol or use illicit drugs.    MEDICATIONS:  Current Outpatient Prescriptions   Medication Sig Dispense Refill      Acetaminophen (TYLENOL PO) Take 1,000 mg by mouth 3 times daily       ASPIRIN CAPS 81 MG OR 1 TABLET DAILY       bisacodyl (DULCOLAX) 10 MG Suppository Place 10 mg rectally daily as needed for constipation       CRANBERRY PO Take 250 mg by mouth 3 times daily        losartan (COZAAR) 25 MG tablet Take 1 tablet (25 mg) by mouth daily (Needs follow-up appointment for this medication) 30 tablet 0     MELATONIN PO Take 10 mg by mouth At Bedtime       polyethylene glycol (MIRALAX/GLYCOLAX) Packet Take 17 g by mouth daily       senna-docusate (SENOKOT-S;PERICOLACE) 8.6-50 MG per tablet Take 1 tablet by mouth 2 times daily       Sertraline HCl (ZOLOFT PO) Take by mouth daily 75mg PO daily       TRAMADOL HCL PO Take 25 mg by mouth 2 times daily And q3 hours PRN       TRAZODONE HCL PO Take 50 mg by mouth At Bedtime        Medications reviewed:  Medications reconciled to facility chart and changes were made to reflect current medications as identified as above med list. Below are the changes that were made:   Medications stopped since last EPIC medication reconciliation:   There are no discontinued medications.    Medications started since last [facility] medication reconciliation:  No orders of the defined types were placed in this encounter.        Case Management:  I have reviewed the care plan and MDS and do agree with the plan. Patient's desire to return to the community is not assessible due to cognitive impairment.  Information reviewed:  Medications, vital signs, orders, and nursing notes.    ROS:  10 point ROS of systems including Constitutional, Eyes, Respiratory, Cardiovascular, Gastroenterology, Genitourinary, Integumentary, Muscularskeletal, Psychiatric were all negative except for pertinent positives noted in my HPI.    Exam:  Vitals: /72  Pulse 80  Temp 98.5  F (36.9  C)  Resp 19  Wt 171 lb (77.6 kg)  SpO2 97%  BMI 30.29 kg/m2  BMI= Body mass index is 30.29 kg/(m^2).  GENERAL APPEARANCE:  Alert, in no  distress  RESP:  respiratory effort and palpation of chest normal, auscultation of lungs clear , no respiratory distress  CV:  Palpation and auscultation of heart done , rate and rhythm regular, no murmur, no peripheral edema  ABDOMEN:  normal bowel sounds, soft, nontender, no hepatosplenomegaly or other masses  M/S:   Gait and station in wheelchair, Digits and nails normal  SKIN:  Inspection and Palpation of skin and subcutaneous tissue normal  NEURO: 2-12 in normal limits and at patient's baseline  PSYCH:  insight and judgement, memory poor , affect and mood normal      Lab/Diagnostic data:     CBC RESULTS:   Recent Labs   Lab Test  01/14/18   1343  08/26/13   1841   WBC  8.2  7.7   RBC  3.79*  4.21   HGB  11.7  12.8   HCT  35.3  39.3   MCV  93  93   MCH  30.9  30.4   MCHC  33.1  32.6   RDW  13.0  13.2   PLT  226  239       Last Basic Metabolic Panel:  Recent Labs   Lab Test  01/14/18   1343  09/03/15   1659   NA  140  137   POTASSIUM  3.6  3.6   CHLORIDE  106  100   ZACK  8.6  9.3   CO2  24  30   BUN  17  16   CR  0.82  0.65   GLC  142*  86       Liver Function Studies -   Recent Labs   Lab Test  01/14/18   1343  09/03/15   1659   PROTTOTAL  6.6*  7.5   ALBUMIN  3.3*  3.9   BILITOTAL  0.3  0.4   ALKPHOS  67  63   AST  18  17   ALT  19  16       TSH   Date Value Ref Range Status   01/25/2018 2.00 0.40 - 4.00 mU/L Final   09/03/2015 1.79 0.40 - 4.00 mU/L Final   ]    Lab Results   Component Value Date    A1C 5.8 02/16/2012    A1C 5.9 07/19/2011       ASSESSMENT/PLAN  Essential hypertension  -   - Munguia top losartan 25 mg. Nsg to update is SBP >170  - continue ASA for now    Late onset Alzheimer's disease without behavioral disturbance  - needing assistance with ADL's. Increased confusion in   AM. May be r/t hypotension.   - Will check labs    Anxiety  - stable on 75 mg zoloft. Consider GDR if remains stable.     Bilateral hearing loss, unspecified hearing loss type  - hearing well with bilateral hearing  aids    Other insomnia  - chronic problem. Continue trazodone and melatonin    Slow transit constipation  - stable on mirlalax daily      Orders:  1. DC losartan- check BP every day x 3 days and call NP if SBP >170.  2. CBC, CMP, TSH, Folate and B12 Dx Dementia    Total time spent with patient visit at the skilled nursing facility was 38 including patient visit, review of past records and discussion with staff. Greater than 50% of total time spent with counseling and coordinating care due to complex conditions    Electronically signed by:  VALERIE Singh CNP        Sincerely,        VALERIE Singh CNP

## 2018-08-03 ENCOUNTER — HOSPITAL LABORATORY (OUTPATIENT)
Facility: OTHER | Age: 83
End: 2018-08-03

## 2018-08-03 LAB
ALBUMIN SERPL-MCNC: 3.3 G/DL (ref 3.4–5)
ALP SERPL-CCNC: 57 U/L (ref 40–150)
ALT SERPL W P-5'-P-CCNC: 17 U/L (ref 0–50)
ANION GAP SERPL CALCULATED.3IONS-SCNC: 5 MMOL/L (ref 3–14)
AST SERPL W P-5'-P-CCNC: 11 U/L (ref 0–45)
BILIRUB SERPL-MCNC: 0.4 MG/DL (ref 0.2–1.3)
BUN SERPL-MCNC: 23 MG/DL (ref 7–30)
CALCIUM SERPL-MCNC: 8.7 MG/DL (ref 8.5–10.1)
CHLORIDE SERPL-SCNC: 105 MMOL/L (ref 94–109)
CO2 SERPL-SCNC: 30 MMOL/L (ref 20–32)
CREAT SERPL-MCNC: 0.82 MG/DL (ref 0.52–1.04)
ERYTHROCYTE [DISTWIDTH] IN BLOOD BY AUTOMATED COUNT: 13.2 % (ref 10–15)
GFR SERPL CREATININE-BSD FRML MDRD: 66 ML/MIN/1.7M2
GLUCOSE SERPL-MCNC: 86 MG/DL (ref 70–99)
HCT VFR BLD AUTO: 35 % (ref 35–47)
HGB BLD-MCNC: 11.2 G/DL (ref 11.7–15.7)
MCH RBC QN AUTO: 31.1 PG (ref 26.5–33)
MCHC RBC AUTO-ENTMCNC: 32 G/DL (ref 31.5–36.5)
MCV RBC AUTO: 97 FL (ref 78–100)
PLATELET # BLD AUTO: 222 10E9/L (ref 150–450)
POTASSIUM SERPL-SCNC: 3.9 MMOL/L (ref 3.4–5.3)
PROT SERPL-MCNC: 6.8 G/DL (ref 6.8–8.8)
RBC # BLD AUTO: 3.6 10E12/L (ref 3.8–5.2)
SODIUM SERPL-SCNC: 140 MMOL/L (ref 133–144)
WBC # BLD AUTO: 5.4 10E9/L (ref 4–11)

## 2018-08-06 LAB — VIT B12 USB SERPL-MCNC: 1263 PG/ML (ref 800–2600)

## 2018-09-25 LAB
FOLATE RBC-MCNC: 1023 NG/ML
HCT VFR BLD CALC: 35 %

## 2018-10-04 VITALS
DIASTOLIC BLOOD PRESSURE: 80 MMHG | TEMPERATURE: 98.6 F | WEIGHT: 169 LBS | RESPIRATION RATE: 18 BRPM | SYSTOLIC BLOOD PRESSURE: 145 MMHG | OXYGEN SATURATION: 94 % | HEART RATE: 93 BPM | BODY MASS INDEX: 29.94 KG/M2

## 2018-10-05 NOTE — PROGRESS NOTES
[place] [facility]    Chief Complaint   Patient presents with     alf Regulatory       [place] Medical Record Number:  [id number]  Place of Service where encounter took place:  [facility] ([facility]) [[id number]]    HPI:    Sherri Bender is a 86 year old  (6/6/1932), who is being seen today for a federally mandated E/M visit.  HPI information obtained from: facility chart records, facility staff, patient report and [facility] chart review.     Today's concerns are:  -  - Resident seen and examined. Non verbal.    Reportedly sleep, appetite and BM are fine.   - RN has no concern today.   - GNP reports that losartan stopped in August d/t low SBP and AM confusion & weakness. Labs ordered in August - no concerns  --------------------------------  - - Past Medical, social, family histories, medications, and allergies reviewed and updated  - Medications reviewed: in the chart and EHR.   - Case Management:   I have reviewed the care plan and MDS and do agree with the plan. Patient's desire to return to the community is not present.  Information reviewed:  Medications, vital signs, orders, and nursing notes.    MEDICATIONS:  Current Outpatient Prescriptions   Medication Sig Dispense Refill     Acetaminophen (TYLENOL PO) Take 1,000 mg by mouth 3 times daily       ASPIRIN CAPS 81 MG OR 1 TABLET DAILY       bisacodyl (DULCOLAX) 10 MG Suppository Place 10 mg rectally daily as needed for constipation       CRANBERRY PO Take 250 mg by mouth 3 times daily        MELATONIN PO Take 10 mg by mouth At Bedtime       polyethylene glycol (MIRALAX/GLYCOLAX) Packet Take 17 g by mouth daily       senna-docusate (SENOKOT-S;PERICOLACE) 8.6-50 MG per tablet Take 1 tablet by mouth 2 times daily       Sertraline HCl (ZOLOFT PO) Take by mouth daily 75mg PO daily       TRAMADOL HCL PO Take 25 mg by mouth 2 times daily And q3 hours PRN       TRAZODONE HCL PO Take 50 mg by mouth At Bedtime         ROS:  Unobtainable secondary to cognitive impairment.     Exam:  Vitals: /80  Pulse 93  Temp 98.6  F (37  C)  Resp 18  Wt 169 lb (76.7 kg)  SpO2 94%  BMI 29.94 kg/m2  BMI= Body mass index is 29.94 kg/(m^2).  GENERAL APPEARANCE:  in no distress  RESP:  respiratory effort and palpation of chest normal, lungs clear to auscultation , no respiratory distress  CV:  Palpation and auscultation of heart done , regular rate and rhythm, no murmur, rub, or gallop, no edema  ABDOMEN:  normal bowel sounds, soft, nontender, no hepatosplenomegaly or other masses  M/S:   Gait and station abnormal uses walker and a WC.   SKIN:  Inspection of skin and subcutaneous tissue baseline, Palpation of skin and subcutaneous tissue baseline  NEURO:   no NFD appreciated on observation  PSYCH:  oriented to person only, otherwise pleasantly confused, mood and affect fine.     Lab/Diagnostic data:     CBC RESULTS:   Recent Labs   Lab Test  08/03/18   0652  01/14/18   1343   WBC  5.4  8.2   RBC  3.60*  3.79*   HGB  11.2*  11.7   HCT  35.0  35.3   MCV  97  93   MCH  31.1  30.9   MCHC  32.0  33.1   RDW  13.2  13.0   PLT  222  226       Last Basic Metabolic Panel:  Recent Labs   Lab Test  08/03/18   0652  01/14/18   1343   NA  140  140   POTASSIUM  3.9  3.6   CHLORIDE  105  106   ZACK  8.7  8.6   CO2  30  24   BUN  23  17   CR  0.82  0.82   GLC  86  142*       Liver Function Studies -   Recent Labs   Lab Test  08/03/18   0652  01/14/18   1343   PROTTOTAL  6.8  6.6*   ALBUMIN  3.3*  3.3*   BILITOTAL  0.4  0.3   ALKPHOS  57  67   AST  11  18   ALT  17  19     ASSESSMENT/PLAN  Paranoia (H)  Agitation  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       Systolic congestive heart failure, unspecified congestive heart failure chronicity (H)  HTN, goal below 150/90  - compensated, BP w/i acceptable range.   -  Keep SBP above 130 in this frail elderly with limited life expectancy. May liberalized SBP up to 160  mmHg as long as Resident is asymptomatic.   - consider stopping ASA.      Seasonal Rhinitis:  - at baseline.       Falls frequently  Frail elderly  - - Significant  Deficits requiring NH placement. Requiring extensive assistance from nursing. Up for meals only o/w spends the day resting in bed      Alzheimer's disease of other onset without behavioral disturbance  - off donepezil.   - Continue to anticipate needs. Chronic condition, ongoing decline expected.   -  Continue to provide redirection and reassurance as needed. Maintain safe living situation with goals focused on comfort.    Orders:  1. No new orders at this time    Electronically signed by:  Aquiles Hernandez MD

## 2018-10-08 ENCOUNTER — NURSING HOME VISIT (OUTPATIENT)
Dept: GERIATRICS | Facility: CLINIC | Age: 83
End: 2018-10-08
Payer: COMMERCIAL

## 2018-10-08 ENCOUNTER — HOSPITAL LABORATORY (OUTPATIENT)
Facility: OTHER | Age: 83
End: 2018-10-08

## 2018-10-08 DIAGNOSIS — F22 PARANOIA (H): Primary | ICD-10-CM

## 2018-10-08 DIAGNOSIS — I10 ESSENTIAL HYPERTENSION: ICD-10-CM

## 2018-10-08 DIAGNOSIS — R54 FRAIL ELDERLY: ICD-10-CM

## 2018-10-08 DIAGNOSIS — F32.0 MILD SINGLE CURRENT EPISODE OF MAJOR DEPRESSIVE DISORDER (H): ICD-10-CM

## 2018-10-08 DIAGNOSIS — G30.1 LATE ONSET ALZHEIMER'S DISEASE WITHOUT BEHAVIORAL DISTURBANCE (H): ICD-10-CM

## 2018-10-08 DIAGNOSIS — F02.80 LATE ONSET ALZHEIMER'S DISEASE WITHOUT BEHAVIORAL DISTURBANCE (H): ICD-10-CM

## 2018-10-08 DIAGNOSIS — I50.22 CHRONIC SYSTOLIC CONGESTIVE HEART FAILURE (H): ICD-10-CM

## 2018-10-08 LAB
ANION GAP SERPL CALCULATED.3IONS-SCNC: 7 MMOL/L (ref 3–14)
BUN SERPL-MCNC: 22 MG/DL (ref 7–30)
CALCIUM SERPL-MCNC: 8.7 MG/DL (ref 8.5–10.1)
CHLORIDE SERPL-SCNC: 106 MMOL/L (ref 94–109)
CO2 SERPL-SCNC: 30 MMOL/L (ref 20–32)
CREAT SERPL-MCNC: 0.88 MG/DL (ref 0.52–1.04)
GFR SERPL CREATININE-BSD FRML MDRD: 60 ML/MIN/1.7M2
GLUCOSE SERPL-MCNC: 79 MG/DL (ref 70–99)
HGB BLD-MCNC: 11.4 G/DL (ref 11.7–15.7)
POTASSIUM SERPL-SCNC: 4.2 MMOL/L (ref 3.4–5.3)
SODIUM SERPL-SCNC: 143 MMOL/L (ref 133–144)

## 2018-10-08 PROCEDURE — 99309 SBSQ NF CARE MODERATE MDM 30: CPT | Performed by: FAMILY MEDICINE

## 2018-10-08 NOTE — LETTER
10/8/2018        RE: Sherri Bender  [address] [place] [address]          [place] [facility]    Chief Complaint   Patient presents with     retirement Regulatory       [place] Medical Record Number:  [id number]  Place of Service where encounter took place:  [facility] ([facility] [[id number]]    HPI:    Sherri Bender is a 86 year old  (6/6/1932), who is being seen today for a federally mandated E/M visit.  HPI information obtained from: facility chart records, facility staff, patient report and [facility] chart review.     Today's concerns are:  -  - Resident seen and examined. Non verbal.    Reportedly sleep, appetite and BM are fine.   - RN has no concern today.   - GNP reports that losartan stopped in August d/t low SBP and AM confusion & weakness. Labs ordered in August - no concerns  --------------------------------  - - Past Medical, social, family histories, medications, and allergies reviewed and updated  - Medications reviewed: in the chart and EHR.   - Case Management:   I have reviewed the care plan and MDS and do agree with the plan. Patient's desire to return to the community is not present.  Information reviewed:  Medications, vital signs, orders, and nursing notes.    MEDICATIONS:  Current Outpatient Prescriptions   Medication Sig Dispense Refill     Acetaminophen (TYLENOL PO) Take 1,000 mg by mouth 3 times daily       ASPIRIN CAPS 81 MG OR 1 TABLET DAILY       bisacodyl (DULCOLAX) 10 MG Suppository Place 10 mg rectally daily as needed for constipation       CRANBERRY PO Take 250 mg by mouth 3 times daily        MELATONIN PO Take 10 mg by mouth At Bedtime       polyethylene glycol (MIRALAX/GLYCOLAX) Packet Take 17 g by mouth daily       senna-docusate (SENOKOT-S;PERICOLACE) 8.6-50 MG per tablet Take 1 tablet by mouth 2 times daily       Sertraline HCl (ZOLOFT PO) Take by mouth daily 75mg PO daily       TRAMADOL HCL PO Take 25 mg by mouth 2  times daily And q3 hours PRN       TRAZODONE HCL PO Take 50 mg by mouth At Bedtime        ROS:  Unobtainable secondary to cognitive impairment.     Exam:  Vitals: /80  Pulse 93  Temp 98.6  F (37  C)  Resp 18  Wt 169 lb (76.7 kg)  SpO2 94%  BMI 29.94 kg/m2  BMI= Body mass index is 29.94 kg/(m^2).  GENERAL APPEARANCE:  in no distress  RESP:  respiratory effort and palpation of chest normal, lungs clear to auscultation , no respiratory distress  CV:  Palpation and auscultation of heart done , regular rate and rhythm, no murmur, rub, or gallop, no edema  ABDOMEN:  normal bowel sounds, soft, nontender, no hepatosplenomegaly or other masses  M/S:   Gait and station abnormal uses walker and a WC.   SKIN:  Inspection of skin and subcutaneous tissue baseline, Palpation of skin and subcutaneous tissue baseline  NEURO:   no NFD appreciated on observation  PSYCH:  oriented to person only, otherwise pleasantly confused, mood and affect fine.     Lab/Diagnostic data:     CBC RESULTS:   Recent Labs   Lab Test  08/03/18   0652  01/14/18   1343   WBC  5.4  8.2   RBC  3.60*  3.79*   HGB  11.2*  11.7   HCT  35.0  35.3   MCV  97  93   MCH  31.1  30.9   MCHC  32.0  33.1   RDW  13.2  13.0   PLT  222  226       Last Basic Metabolic Panel:  Recent Labs   Lab Test  08/03/18   0652  01/14/18   1343   NA  140  140   POTASSIUM  3.9  3.6   CHLORIDE  105  106   ZACK  8.7  8.6   CO2  30  24   BUN  23  17   CR  0.82  0.82   GLC  86  142*       Liver Function Studies -   Recent Labs   Lab Test  08/03/18   0652  01/14/18   1343   PROTTOTAL  6.8  6.6*   ALBUMIN  3.3*  3.3*   BILITOTAL  0.4  0.3   ALKPHOS  57  67   AST  11  18   ALT  17  19     ASSESSMENT/PLAN  Paranoia (H)  Agitation  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       Systolic congestive heart failure, unspecified congestive heart failure chronicity (H)  HTN, goal below 150/90  - compensated, BP w/i acceptable range.   -  Keep SBP above  130 in this frail elderly with limited life expectancy. May liberalized SBP up to 160 mmHg as long as Resident is asymptomatic.   - consider stopping ASA.      Seasonal Rhinitis:  - at baseline.       Falls frequently  Frail elderly  - - Significant  Deficits requiring NH placement. Requiring extensive assistance from nursing. Up for meals only o/w spends the day resting in bed      Alzheimer's disease of other onset without behavioral disturbance  - off donepezil.   - Continue to anticipate needs. Chronic condition, ongoing decline expected.   -  Continue to provide redirection and reassurance as needed. Maintain safe living situation with goals focused on comfort.    Orders:  1. No new orders at this time    Electronically signed by:  Aquiles Hernandez MD        Sincerely,        Aquiles Hernandez MD

## 2018-10-17 ENCOUNTER — TELEPHONE (OUTPATIENT)
Dept: FAMILY MEDICINE | Facility: CLINIC | Age: 83
End: 2018-10-17

## 2018-10-17 NOTE — TELEPHONE ENCOUNTER
Request for Tramadol 50 mg.  Last noted as historical.  Need new order.  Last seen 10/5/16.  Pt is in the Nursing Home.  Order was written by Pilar Ross NP,Geriatrics.  KPavelRN

## 2018-10-17 NOTE — TELEPHONE ENCOUNTER
Faxed request for Tramadol 50 MG Tab, I am unable to select from meds/orders list.    Last filled 09/06/18  Quantity  14    10/08/18  Nursing home visit with EDWIGE Hernandez

## 2018-10-29 ENCOUNTER — MEDICAL CORRESPONDENCE (OUTPATIENT)
Dept: HEALTH INFORMATION MANAGEMENT | Facility: CLINIC | Age: 83
End: 2018-10-29

## 2018-12-03 VITALS
DIASTOLIC BLOOD PRESSURE: 85 MMHG | WEIGHT: 166 LBS | SYSTOLIC BLOOD PRESSURE: 118 MMHG | HEART RATE: 75 BPM | BODY MASS INDEX: 29.41 KG/M2 | OXYGEN SATURATION: 98 % | RESPIRATION RATE: 18 BRPM | TEMPERATURE: 97.8 F

## 2018-12-04 ENCOUNTER — NURSING HOME VISIT (OUTPATIENT)
Dept: GERIATRICS | Facility: CLINIC | Age: 83
End: 2018-12-04
Payer: COMMERCIAL

## 2018-12-04 DIAGNOSIS — Z51.5 HOSPICE CARE: ICD-10-CM

## 2018-12-04 DIAGNOSIS — G30.1 LATE ONSET ALZHEIMER'S DISEASE WITHOUT BEHAVIORAL DISTURBANCE (H): Primary | ICD-10-CM

## 2018-12-04 DIAGNOSIS — M15.0 PRIMARY OSTEOARTHRITIS INVOLVING MULTIPLE JOINTS: ICD-10-CM

## 2018-12-04 DIAGNOSIS — F02.80 LATE ONSET ALZHEIMER'S DISEASE WITHOUT BEHAVIORAL DISTURBANCE (H): Primary | ICD-10-CM

## 2018-12-04 DIAGNOSIS — K59.01 SLOW TRANSIT CONSTIPATION: ICD-10-CM

## 2018-12-04 DIAGNOSIS — F22 PARANOIA (H): ICD-10-CM

## 2018-12-04 DIAGNOSIS — G47.09 OTHER INSOMNIA: ICD-10-CM

## 2018-12-04 DIAGNOSIS — R54 FRAIL ELDERLY: ICD-10-CM

## 2018-12-04 PROCEDURE — 99309 SBSQ NF CARE MODERATE MDM 30: CPT | Mod: GW | Performed by: NURSE PRACTITIONER

## 2018-12-04 NOTE — LETTER
12/4/2018        RE: Sherri Bender  [address] [place] [address]          [place] [facility]    Chief Complaint   Patient presents with     intermediate Regulatory       [place] Medical Record Number:  [id number]  Place of Service where encounter took place:  [facility] [[id number]]    HPI:    Sherri Bender is a 86 year old  (6/6/1932), who is being seen today for a federally mandated E/M visit.  HPI information obtained from: facility chart records, facility staff, patient report and [facility] chart review.     No nsg or hospice concerns reported.   Met with patient in common area in [facility] unit. She is alert. She denies pain and appears comfortable. Breathing is non labored.     ALLERGIES: Nkda [no known drug allergies]  PAST MEDICAL HISTORY:  has a past medical history of Dementia; Depression; Environmental allergies; Family history of ischemic heart disease (2/9/2010); HTN (hypertension); and Osteoarthritis.  PAST SURGICAL HISTORY:  has a past surgical history that includes appendectomy; surgical history of - ; surgical history of - ; tonsillectomy; cataract iol, rt/lt (4/2010); and Colonoscopy (6/20/2011).  FAMILY HISTORY: family history includes Allergies in her brother; Cancer in her brother and mother; Heart Disease in her father and mother.  SOCIAL HISTORY:  reports that she has quit smoking. She has never used smokeless tobacco. She reports that she does not drink alcohol or use illicit drugs.    MEDICATIONS:  Current Outpatient Prescriptions   Medication Sig Dispense Refill     Acetaminophen (TYLENOL PO) Take 1,000 mg by mouth 3 times daily       ASPIRIN CAPS 81 MG OR 1 TABLET DAILY       bisacodyl (DULCOLAX) 10 MG Suppository Place 10 mg rectally daily as needed for constipation       CRANBERRY PO Take 250 mg by mouth 3 times daily        MELATONIN PO Take 10 mg by mouth At Bedtime       polyethylene glycol (MIRALAX/GLYCOLAX) Packet Take  17 g by mouth daily       senna-docusate (SENOKOT-S;PERICOLACE) 8.6-50 MG per tablet Take 1 tablet by mouth 2 times daily       Sertraline HCl (ZOLOFT PO) Take by mouth daily 75mg PO daily       TRAMADOL HCL PO Take 25 mg by mouth 2 times daily And q3 hours PRN       TRAZODONE HCL PO Take 50 mg by mouth At Bedtime        Medications reviewed:  Medications reconciled to facility chart and changes were made to reflect current medications as identified as above med list. Below are the changes that were made:   Medications stopped since last EPIC medication reconciliation:   There are no discontinued medications.    Medications started since last [facility] medication reconciliation:  No orders of the defined types were placed in this encounter.        Case Management:  I have reviewed the care plan and MDS and do agree with the plan. Patient's desire to return to the community is not present.  Information reviewed:  Medications, vital signs, orders, and nursing notes.    ROS:  Unobtainable secondary to cognitive impairment.     Exam:  Vitals: /85  Pulse 75  Temp 97.8  F (36.6  C)  Resp 18  Wt 166 lb (75.3 kg)  SpO2 98%  BMI 29.41 kg/m2  BMI= Body mass index is 29.41 kg/(m^2).  GENERAL APPEARANCE:  Alert, in no distress  RESP:  respiratory effort and palpation of chest normal, auscultation of lungs clear , no respiratory distress  CV:  Palpation and auscultation of heart done , rate and rhythm regular, no peripheral edema  ABDOMEN:  normal bowel sounds, soft, nontender, no hepatosplenomegaly or other masses  M/S:   Gait and station non ambulatory, Digits and nails at baseline  SKIN:  Inspection and Palpation of skin and subcutaneous tissue no rashes or lesions to exposed skin  NEURO: 2-12 in normal limits and at patient's baseline  PSYCH:  insight and judgement, memory impaired , affect and mood normal      Lab/Diagnostic data:     CBC RESULTS:   Recent Labs   Lab Test  10/08/18   1020  08/03/18   0652  01/14/18    1343   WBC   --   5.4  8.2   RBC   --   3.60*  3.79*   HGB  11.4*  11.2*  11.7   HCT   --   35.0  35.3   MCV   --   97  93   MCH   --   31.1  30.9   MCHC   --   32.0  33.1   RDW   --   13.2  13.0   PLT   --   222  226       Last Basic Metabolic Panel:  Recent Labs   Lab Test  10/08/18   1020  08/03/18   0652   NA  143  140   POTASSIUM  4.2  3.9   CHLORIDE  106  105   ZACK  8.7  8.7   CO2  30  30   BUN  22  23   CR  0.88  0.82   GLC  79  86       Liver Function Studies -   Recent Labs   Lab Test  08/03/18   0652  01/14/18   1343   PROTTOTAL  6.8  6.6*   ALBUMIN  3.3*  3.3*   BILITOTAL  0.4  0.3   ALKPHOS  57  67   AST  11  18   ALT  17  19       TSH   Date Value Ref Range Status   01/25/2018 2.00 0.40 - 4.00 mU/L Final   09/03/2015 1.79 0.40 - 4.00 mU/L Final   ]    Lab Results   Component Value Date    A1C 5.8 02/16/2012    A1C 5.9 07/19/2011       ASSESSMENT/PLAN  Late onset Alzheimer's disease without behavioral disturbance  Paranoia (H)  Frail elderly  Other insomnia  - Doing well in .   - No sleep or behavior concerns. Will GDR trazodone from 25 to 12.5 mg.    Slow transit constipation  - no concerns. Continue miralax and senna. Nsg to update with concerns    Primary osteoarthritis involving multiple joints  - continue scheduled tylenol and tramadol  - nsg to update with pain concerns    Hospice care- [facility]    Orders:  1. Decrease trazadone To 12.5 mg po at bedtime Dx insomnia  2. Discontinue aspirin        Electronically signed by:  VALERIE Singh CNP        Sincerely,        VALERIE Singh CNP

## 2019-02-10 NOTE — PROGRESS NOTES
[place] [facility]  Chief Complaint   Patient presents with     MCFP Regulatory   [place] Medical Record Number:  [id number]  Place of Service where encounter took place:  [facility] ([facility]) [[id number]]    HPI:    Sherri Bender is a 86 year old  (6/6/1932), who is being seen today for a federally mandated E/M visit.  HPI information obtained from: facility chart records, facility staff, patient report and [facility] chart review.     Today's concerns are:  - Resident seen and examined. Non verbal.    Reportedly sleep, appetite and BM are fine.   - RN has no concern today.   - GN P has no concerns  --------------------------------  - - Past Medical, social, family histories, medications, and allergies reviewed and updated  - Medications reviewed: in the chart and EHR.   - Case Management:   I have reviewed the care plan and MDS and do agree with the plan. Patient's desire to return to the community is not present.  Information reviewed:  Medications, vital signs, orders, and nursing notes.    MEDICATIONS:  Current Outpatient Medications   Medication Sig Dispense Refill     Acetaminophen (TYLENOL PO) Take 1,000 mg by mouth 3 times daily       bisacodyl (DULCOLAX) 10 MG Suppository Place 10 mg rectally daily as needed for constipation       CRANBERRY PO Take 250 mg by mouth 3 times daily        magnesium hydroxide (MILK OF MAGNESIA) 400 MG/5ML suspension Take 30 mLs by mouth daily as needed for constipation or heartburn       MELATONIN PO Take 10 mg by mouth At Bedtime       polyethylene glycol (MIRALAX/GLYCOLAX) Packet Take 17 g by mouth daily       senna-docusate (SENOKOT-S;PERICOLACE) 8.6-50 MG per tablet Take 1 tablet by mouth daily        Sertraline HCl (ZOLOFT PO) Take 50 mg by mouth daily 75mg PO daily        TRAMADOL HCL PO Take 25 mg by mouth 2 times daily And q3 hours PRN       TRAZODONE HCL PO Take 12.5 mg by mouth At Bedtime        ROS: Unobtainable secondary to  cognitive impairment.      Exam:  Vitals: /72   Pulse 87   Temp 98.2  F (36.8  C)   Resp 18   Wt 77.1 kg (170 lb)   SpO2 97%   BMI 30.11 kg/m    BMI= Body mass index is 30.11 kg/m .  GENERAL APPEARANCE:  in no distress  RESP:  lungs clear to auscultation , no respiratory distress  CV:  S1S2 nl, regular rate and rhythm, no murmur, rub, or gallop, no edema  ABDOMEN:  normal bowel sounds, soft, nontender, no hepatosplenomegaly or other masses  M/S:   Gait and station abnormal uses walker and a WC.   SKIN:  Inspection of skin and subcutaneous tissue baseline, Palpation of skin and subcutaneous tissue baseline  NEURO:   no NFD appreciated on observation  PSYCH:  oriented to person only, otherwise pleasantly confused, mood and affect fine. impaired memory    Lab/Diagnostic data: Reviewed in the chart and EHR.        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       Systolic congestive heart failure, unspecified congestive heart failure chronicity (H)  HTN, goal below 150/90  - compensated, BP w/i acceptable range.   -  Keep SBP above 130 in this frail elderly with limited life expectancy. May liberalized SBP up to 160 mmHg as long as Resident is asymptomatic.   - off ASA.      Frail elderly  - Significant  Deficits requiring NH placement. Requiring extensive assistance from nursing. Up for meals only o/w spends the day resting in bed      Alzheimer's disease of other onset without behavioral disturbance  - off donepezil.   - Continue to anticipate needs. Chronic condition, ongoing decline expected.   -  Continue to provide redirection and reassurance as needed. Maintain safe living situation with goals focused on comfort.    Orders:  - See above, otherwise, continue the rest of the current POC.     Electronically signed by:  Aquiles Hernandez MD

## 2019-02-11 ENCOUNTER — NURSING HOME VISIT (OUTPATIENT)
Dept: GERIATRICS | Facility: CLINIC | Age: 84
End: 2019-02-11
Payer: COMMERCIAL

## 2019-02-11 VITALS
BODY MASS INDEX: 30.11 KG/M2 | RESPIRATION RATE: 18 BRPM | TEMPERATURE: 98.2 F | HEART RATE: 87 BPM | DIASTOLIC BLOOD PRESSURE: 72 MMHG | OXYGEN SATURATION: 97 % | SYSTOLIC BLOOD PRESSURE: 128 MMHG | WEIGHT: 170 LBS

## 2019-02-11 DIAGNOSIS — R54 FRAIL ELDERLY: ICD-10-CM

## 2019-02-11 DIAGNOSIS — F02.80 LATE ONSET ALZHEIMER'S DISEASE WITHOUT BEHAVIORAL DISTURBANCE (H): ICD-10-CM

## 2019-02-11 DIAGNOSIS — F32.0 MILD SINGLE CURRENT EPISODE OF MAJOR DEPRESSIVE DISORDER (H): ICD-10-CM

## 2019-02-11 DIAGNOSIS — G30.1 LATE ONSET ALZHEIMER'S DISEASE WITHOUT BEHAVIORAL DISTURBANCE (H): ICD-10-CM

## 2019-02-11 DIAGNOSIS — F22 PARANOIA (H): Primary | ICD-10-CM

## 2019-02-11 DIAGNOSIS — I50.22 CHRONIC SYSTOLIC CONGESTIVE HEART FAILURE (H): ICD-10-CM

## 2019-02-11 DIAGNOSIS — I10 ESSENTIAL HYPERTENSION: ICD-10-CM

## 2019-02-11 PROCEDURE — 99309 SBSQ NF CARE MODERATE MDM 30: CPT | Mod: GW | Performed by: FAMILY MEDICINE

## 2019-02-11 NOTE — LETTER
2/11/2019        RE: Sherri Bender  [address] [place] [address]        [place] [facility]  Chief Complaint   Patient presents with     alf Regulatory   [place] Medical Record Number:  [id number]  Place of Service where encounter took place:  [facility] ([facility] [[id number]]    HPI:    Sherri Bender is a 86 year old  (6/6/1932), who is being seen today for a federally mandated E/M visit.  HPI information obtained from: facility chart records, facility staff, patient report and [facility] chart review.     Today's concerns are:  - Resident seen and examined. Non verbal.    Reportedly sleep, appetite and BM are fine.   - RN has no concern today.   - GN P has  no concerns  --------------------------------  - - Past Medical, social, family histories, medications, and allergies reviewed and updated  - Medications reviewed: in the chart and EHR.   - Case Management:   I have reviewed the care plan and MDS and do agree with the plan. Patient's desire to return to the community is not present.  Information reviewed:  Medications, vital signs, orders, and nursing notes.    MEDICATIONS:  Current Outpatient Medications   Medication Sig Dispense Refill     Acetaminophen (TYLENOL PO) Take 1,000 mg by mouth 3 times daily       bisacodyl (DULCOLAX) 10 MG Suppository Place 10 mg rectally daily as needed for constipation       CRANBERRY PO Take 250 mg by mouth 3 times daily        magnesium hydroxide (MILK OF MAGNESIA) 400 MG/5ML suspension Take 30 mLs by mouth daily as needed for constipation or heartburn       MELATONIN PO Take 10 mg by mouth At Bedtime       polyethylene glycol (MIRALAX/GLYCOLAX) Packet Take 17 g by mouth daily       senna-docusate (SENOKOT-S;PERICOLACE) 8.6-50 MG per tablet Take 1 tablet by mouth daily        Sertraline HCl (ZOLOFT PO) Take 50 mg by mouth daily 75mg PO daily        TRAMADOL HCL PO Take 25 mg by mouth 2 times daily And q3  hours PRN       TRAZODONE HCL PO Take 12.5 mg by mouth At Bedtime        ROS: Unobtainable secondary to cognitive impairment.      Exam:  Vitals: /72   Pulse 87   Temp 98.2  F (36.8  C)   Resp 18   Wt 77.1 kg (170 lb)   SpO2 97%   BMI 30.11 kg/m     BMI= Body mass index is 30.11 kg/m .  GENERAL APPEARANCE:  in no distress  RESP:  lungs clear to auscultation , no respiratory distress  CV:  S1S2 nl, regular rate and rhythm, no murmur, rub, or gallop, no edema  ABDOMEN:  normal bowel sounds, soft, nontender, no hepatosplenomegaly or other masses  M/S:   Gait and station abnormal uses walker and a WC.   SKIN:  Inspection of skin and subcutaneous tissue baseline, Palpation of skin and subcutaneous tissue baseline  NEURO:   no NFD appreciated on observation  PSYCH:  oriented to person only, otherwise pleasantly confused, mood and affect fine. impaired memory    Lab/Diagnostic data: Reviewed in the chart and EHR.        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       Systolic congestive heart failure, unspecified congestive heart failure chronicity (H)  HTN, goal below 150/90  - compensated, BP w/i acceptable range.   -  Keep SBP above 130 in this frail elderly with limited life expectancy. May liberalized SBP up to 160 mmHg as long as Resident is asymptomatic.   - off ASA.      Frail elderly  - Significant  Deficits requiring NH placement. Requiring extensive assistance from nursing. Up for meals only o/w spends the day resting in bed      Alzheimer's disease of other onset without behavioral disturbance  - off donepezil.   - Continue to anticipate needs. Chronic condition, ongoing decline expected.   -  Continue to provide redirection and reassurance as needed. Maintain safe living situation with goals focused on comfort.    Orders:  - See above, otherwise, continue the rest of the current POC.     Electronically signed by:  Aquiles  MD Mary        Sincerely,        Aquiles Hernandez MD

## 2019-04-15 ENCOUNTER — NURSING HOME VISIT (OUTPATIENT)
Dept: GERIATRICS | Facility: CLINIC | Age: 84
End: 2019-04-15
Payer: COMMERCIAL

## 2019-04-15 VITALS
HEART RATE: 93 BPM | RESPIRATION RATE: 17 BRPM | WEIGHT: 168 LBS | OXYGEN SATURATION: 97 % | SYSTOLIC BLOOD PRESSURE: 148 MMHG | DIASTOLIC BLOOD PRESSURE: 73 MMHG | TEMPERATURE: 97.9 F | BODY MASS INDEX: 29.76 KG/M2

## 2019-04-15 DIAGNOSIS — K59.01 SLOW TRANSIT CONSTIPATION: ICD-10-CM

## 2019-04-15 DIAGNOSIS — I50.22 CHRONIC SYSTOLIC CONGESTIVE HEART FAILURE (H): ICD-10-CM

## 2019-04-15 DIAGNOSIS — F22 PARANOIA (H): ICD-10-CM

## 2019-04-15 DIAGNOSIS — G47.09 OTHER INSOMNIA: ICD-10-CM

## 2019-04-15 DIAGNOSIS — F02.80 LATE ONSET ALZHEIMER'S DISEASE WITHOUT BEHAVIORAL DISTURBANCE (H): Primary | ICD-10-CM

## 2019-04-15 DIAGNOSIS — I10 ESSENTIAL HYPERTENSION: ICD-10-CM

## 2019-04-15 DIAGNOSIS — G30.1 LATE ONSET ALZHEIMER'S DISEASE WITHOUT BEHAVIORAL DISTURBANCE (H): Primary | ICD-10-CM

## 2019-04-15 DIAGNOSIS — Z51.5 HOSPICE CARE: ICD-10-CM

## 2019-04-15 DIAGNOSIS — F32.0 MILD SINGLE CURRENT EPISODE OF MAJOR DEPRESSIVE DISORDER (H): ICD-10-CM

## 2019-04-15 PROCEDURE — 99318 ZZC ANNUAL NURSING FAC ASSESSMNT, STABLE: CPT | Mod: GW | Performed by: NURSE PRACTITIONER

## 2019-04-15 NOTE — PROGRESS NOTES
[place] [facility]  Chief Complaint   Patient presents with     Annual Comprehensive Nursing Home     [place] Medical Record Number:  [id number]  Place of Service where encounter took place:  [facility] [[id number]]    HPI:    Sherri Bender  is a 86 year old  (6/6/1932), who is being seen today for an annual comprehensive visit. HPI information obtained from: facility chart records, facility staff, patient report and [facility] chart review.      Seeing patient today for an annual/ regulatory visit.   Per nsg patient appears to be in pain. Patient does not have morphine on med list. Nsg plans to call [facility] hospice.     No breathing concerns.   No sleep or behavior concerns reported.   No recent falls.   No bowel or bladder concerns.   Patient moaning with exam. She is not able to verbalize pain.     ALLERGIES: Nkda [no known drug allergies]  PAST MEDICAL HISTORY:  has a past medical history of Dementia, Depression, Environmental allergies, Family history of ischemic heart disease (2/9/2010), HTN (hypertension), and Osteoarthritis.  PAST SURGICAL HISTORY:  has a past surgical history that includes appendectomy; surgical history of - ; surgical history of - ; tonsillectomy; cataract iol, rt/lt (4/2010); and Colonoscopy (6/20/2011).  IMMUNIZATIONS:  Immunization History   Administered Date(s) Administered     Influenza (High Dose) 3 valent vaccine 10/17/2011, 10/15/2015, 10/22/2015     Influenza (IIV3) PF 11/13/2007, 10/28/2008, 12/09/2009, 11/01/2010, 10/17/2011, 10/26/2012, 10/01/2013, 10/23/2014     Pneumo Conj 13-V (2010&after) 10/27/2015     Pneumococcal 23 valent 12/20/2001, 11/21/2009     TD (ADULT, 7+) 11/20/2009     TDAP Vaccine (Adacel) 01/01/1990     Zoster vaccine, live 06/16/2014     Above immunizations pulled from [facility]. MIIC and facility records also reconciled. Outstanding information sent to  to update [facility] yes.  Future  immunizations are not needed at this point as all recommended immunizations are up to date.     Current Outpatient Medications   Medication Sig Dispense Refill     Acetaminophen (TYLENOL PO) Take 1,000 mg by mouth 3 times daily       bisacodyl (DULCOLAX) 10 MG Suppository Place 10 mg rectally daily as needed for constipation       CRANBERRY PO Take 250 mg by mouth 3 times daily        magnesium hydroxide (MILK OF MAGNESIA) 400 MG/5ML suspension Take 30 mLs by mouth daily as needed for constipation or heartburn       Melatonin 10 MG TABS tablet Take 1 tablet (10 mg) by mouth At Bedtime 30 tablet 11     polyethylene glycol (MIRALAX/GLYCOLAX) Packet Take 17 g by mouth daily       senna-docusate (SENOKOT-S;PERICOLACE) 8.6-50 MG per tablet Take 1 tablet by mouth daily        Sertraline HCl (ZOLOFT PO) Take 50 mg by mouth daily 75mg PO daily        TRAMADOL HCL PO Take 25 mg by mouth 2 times daily And q3 hours PRN       TRAZODONE HCL PO Take 12.5 mg by mouth At Bedtime          Case Management:  I have reviewed the facility/SNF care plan/MDS, including the falls risk, nutrition and pain screening. I also reviewed the current immunizations, and preventive care. .Future cancer screening is not clinically indicated secondary to age/goals of care Patient's desire to return to the community is not assessible due to cognitive impairment. Current Level of Care is appropriate.    Advance Directive Discussion:    I reviewed the current advanced directives as reflected in EPIC, the POLST and the facility chart, and verified the congruency of orders.    Team Discussion:  I communicated with the appropriate disciplines involved with the Plan of Care:   Nursing    Patient's goal is unobtainable secondary to cognitive impairment.  Information reviewed:  Medications, vital signs, orders, and nursing notes.    ROS:  Unobtainable secondary to cognitive impairment.     Vitals:  /73   Pulse 93   Temp 97.9  F (36.6  C)   Resp 17    Wt 76.2 kg (168 lb)   SpO2 97%   BMI 29.76 kg/m   Body mass index is 29.76 kg/m .  Exam:  GENERAL APPEARANCE:  Alert, non verbal, moaning  RESP:  respiratory effort and palpation of chest normal, auscultation of lungs clear , no respiratory distress  CV:  Palpation and auscultation of heart done , rate and rhythm reg, + murmur, no peripheral edema  ABDOMEN:  normal bowel sounds, soft,RLQ tender with palpation  M/S:   Gait and station non ambulatory, Digits and nails no concerns  SKIN:  Inspection and Palpation of skin and subcutaneous tissue no rashes or lesions to exposed skin. Pale/dry  NEURO: 2-12 in normal limits and at patient's baseline  PSYCH:  insight and judgement, memory impaired , affect and mood anxious    Lab/Diagnostic data:   CBC RESULTS:   Recent Labs   Lab Test 10/08/18  1020 08/03/18  0652 01/14/18  1343   WBC  --  5.4 8.2   RBC  --  3.60* 3.79*   HGB 11.4* 11.2* 11.7   HCT  --  35.0 35.3   MCV  --  97 93   MCH  --  31.1 30.9   MCHC  --  32.0 33.1   RDW  --  13.2 13.0   PLT  --  222 226       Last Basic Metabolic Panel:  Recent Labs   Lab Test 10/08/18  1020 08/03/18  0652    140   POTASSIUM 4.2 3.9   CHLORIDE 106 105   ZACK 8.7 8.7   CO2 30 30   BUN 22 23   CR 0.88 0.82   GLC 79 86       Liver Function Studies -   Recent Labs   Lab Test 08/03/18  0652 01/14/18  1343   PROTTOTAL 6.8 6.6*   ALBUMIN 3.3* 3.3*   BILITOTAL 0.4 0.3   ALKPHOS 57 67   AST 11 18   ALT 17 19       TSH   Date Value Ref Range Status   01/25/2018 2.00 0.40 - 4.00 mU/L Final   09/03/2015 1.79 0.40 - 4.00 mU/L Final   ]    Lab Results   Component Value Date    A1C 5.8 02/16/2012    A1C 5.9 07/19/2011       ASSESSMENT/PLAN  Late onset Alzheimer's disease without behavioral disturbance    Paranoia (H)    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 Consider trial of  ativan if increased anxiety    Other insomnia  - on melatonin & trazodone, continue. Hospice/Nsg to update with concerns     Chronic systolic congestive heart failure (H)  Essential hypertension  - wt's stable. -140, HR 80-90.   - not on htn meds  - not following routine labs    Slow transit constipation  - no concerns, continue current regimen    Hosp[facility] care- [facility]  - goal is comfort. Not following routine labs. Expect decline      transcribed by : Alexandra Gomes  No new orders    Electronically signed by:  VALERIE Singh CNP

## 2019-04-15 NOTE — LETTER
4/15/2019        RE: Sherri Bender  [address] [place] [address]        [place] [facility]  Chief Complaint   Patient presents with     Annual Comprehensive Nursing Home     [place] Medical Record Number:  [id number]  Place of Service where encounter took place[facility] [[id number]]    HPI:    Sherri Bender  is a 86 year old  (6/6/1932), who is being seen today for an annual comprehensive visit. HPI information obtained from: facility chart records, facility staff, patient report and [facility] chart review.      Seeing patient today for an annual/ regulatory visit.   Per nsg patient appears to be in pain. Patient does not have morphine on med list. Nsg plans to call [facility] hospice.     No breathing concerns.   No sleep or behavior concerns reported.   No recent falls.   No bowel or bladder concerns.   Patient moaning with exam. She is not able to verbalize pain.     ALLERGIES: Nkda [no known drug allergies]  PAST MEDICAL HISTORY:  has a past medical history of Dementia, Depression, Environmental allergies, Family history of ischemic heart disease (2/9/2010), HTN (hypertension), and Osteoarthritis.  PAST SURGICAL HISTORY:  has a past surgical history that includes appendectomy; surgical history of - ; surgical history of - ; tonsillectomy; cataract iol, rt/lt (4/2010); and Colonoscopy (6/20/2011).  IMMUNIZATIONS:  Immunization History   Administered Date(s) Administered     Influenza (High Dose) 3 valent vaccine 10/17/2011, 10/15/2015, 10/22/2015     Influenza (IIV3) PF 11/13/2007, 10/28/2008, 12/09/2009, 11/01/2010, 10/17/2011, 10/26/2012, 10/01/2013, 10/23/2014     Pneumo Conj 13-V (2010&after) 10/27/2015     Pneumococcal 23 valent 12/20/2001, 11/21/2009     TD (ADULT, 7+) 11/20/2009     TDAP Vaccine (Adacel) 01/01/1990     Zoster vaccine, live 06/16/2014     Above immunizations pulled from [facility]. MIIC and facility records also  reconciled. Outstanding information sent to  to update [facility] yes.  Future immunizations are not needed at this point as all recommended immunizations are up to date.     Current Outpatient Medications   Medication Sig Dispense Refill     Acetaminophen (TYLENOL PO) Take 1,000 mg by mouth 3 times daily       bisacodyl (DULCOLAX) 10 MG Suppository Place 10 mg rectally daily as needed for constipation       CRANBERRY PO Take 250 mg by mouth 3 times daily        magnesium hydroxide (MILK OF MAGNESIA) 400 MG/5ML suspension Take 30 mLs by mouth daily as needed for constipation or heartburn       Melatonin 10 MG TABS tablet Take 1 tablet (10 mg) by mouth At Bedtime 30 tablet 11     polyethylene glycol (MIRALAX/GLYCOLAX) Packet Take 17 g by mouth daily       senna-docusate (SENOKOT-S;PERICOLACE) 8.6-50 MG per tablet Take 1 tablet by mouth daily        Sertraline HCl (ZOLOFT PO) Take 50 mg by mouth daily 75mg PO daily        TRAMADOL HCL PO Take 25 mg by mouth 2 times daily And q3 hours PRN       TRAZODONE HCL PO Take 12.5 mg by mouth At Bedtime          Case Management:  I have reviewed the facility/SNF care plan/MDS, including the falls risk, nutrition and pain screening. I also reviewed the current immunizations, and preventive care. .Future cancer screening is not clinically indicated secondary to age/goals of care Patient's desire to return to the community is not assessible due to cognitive impairment. Current Level of Care is appropriate.    Advance Directive Discussion:    I reviewed the current advanced directives as reflected in EPIC, the POLST and the facility chart, and verified the congruency of orders.    Team Discussion:  I communicated with the appropriate disciplines involved with the Plan of Care:   Nursing    Patient's goal is unobtainable secondary to cognitive impairment.  Information reviewed:  Medications, vital signs, orders, and nursing notes.    ROS:  Unobtainable secondary to  cognitive impairment.     Vitals:  /73   Pulse 93   Temp 97.9  F (36.6  C)   Resp 17   Wt 76.2 kg (168 lb)   SpO2 97%   BMI 29.76 kg/m    Body mass index is 29.76 kg/m .  Exam:  GENERAL APPEARANCE:  Alert, non verbal, moaning  RESP:  respiratory effort and palpation of chest normal, auscultation of lungs clear , no respiratory distress  CV:  Palpation and auscultation of heart done , rate and rhythm reg, + murmur, no peripheral edema  ABDOMEN:  normal bowel sounds, soft,RLQ tender with palpation  M/S:   Gait and station non ambulatory, Digits and nails no concerns  SKIN:  Inspection and Palpation of skin and subcutaneous tissue no rashes or lesions to exposed skin. Pale/dry  NEURO: 2-12 in normal limits and at patient's baseline  PSYCH:  insight and judgement, memory impaired , affect and mood anxious    Lab/Diagnostic data:   CBC RESULTS:   Recent Labs   Lab Test 10/08/18  1020 08/03/18  0652 01/14/18  1343   WBC  --  5.4 8.2   RBC  --  3.60* 3.79*   HGB 11.4* 11.2* 11.7   HCT  --  35.0 35.3   MCV  --  97 93   MCH  --  31.1 30.9   MCHC  --  32.0 33.1   RDW  --  13.2 13.0   PLT  --  222 226       Last Basic Metabolic Panel:  Recent Labs   Lab Test 10/08/18  1020 08/03/18  0652    140   POTASSIUM 4.2 3.9   CHLORIDE 106 105   ZACK 8.7 8.7   CO2 30 30   BUN 22 23   CR 0.88 0.82   GLC 79 86       Liver Function Studies -   Recent Labs   Lab Test 08/03/18  0652 01/14/18  1343   PROTTOTAL 6.8 6.6*   ALBUMIN 3.3* 3.3*   BILITOTAL 0.4 0.3   ALKPHOS 57 67   AST 11 18   ALT 17 19       TSH   Date Value Ref Range Status   01/25/2018 2.00 0.40 - 4.00 mU/L Final   09/03/2015 1.79 0.40 - 4.00 mU/L Final   ]    Lab Results   Component Value Date    A1C 5.8 02/16/2012    A1C 5.9 07/19/2011       ASSESSMENT/PLAN  Late onset Alzheimer's disease without behavioral disturbance    Paranoia (H)    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 Consider trial of ativan if increased anxiety    Other insomnia  - on melatonin & trazodone, continue. Hospice/Nsg to update with concerns     Chronic systolic congestive heart failure (H)  Essential hypertension  - wt's stable. -140, HR 80-90.   - not on htn meds  - not following routine labs    Slow transit constipation  - no concerns, continue current regimen    Hospice care- [facility]  - goal is comfort. Not following routine labs. Expect decline      transcribed by : Alexandra Gomes  No new orders    Electronically signed by:  VALERIE Singh CNP               Sincerely,        VALERIE Singh CNP

## 2019-06-10 ENCOUNTER — NURSING HOME VISIT (OUTPATIENT)
Dept: GERIATRICS | Facility: CLINIC | Age: 84
End: 2019-06-10
Payer: COMMERCIAL

## 2019-06-10 DIAGNOSIS — G30.1 LATE ONSET ALZHEIMER'S DISEASE WITHOUT BEHAVIORAL DISTURBANCE (H): ICD-10-CM

## 2019-06-10 DIAGNOSIS — F22 PARANOIA (H): Primary | ICD-10-CM

## 2019-06-10 DIAGNOSIS — F02.80 LATE ONSET ALZHEIMER'S DISEASE WITHOUT BEHAVIORAL DISTURBANCE (H): ICD-10-CM

## 2019-06-10 DIAGNOSIS — I50.22 CHRONIC SYSTOLIC CONGESTIVE HEART FAILURE (H): ICD-10-CM

## 2019-06-10 DIAGNOSIS — Z51.5 HOSPICE CARE PATIENT: ICD-10-CM

## 2019-06-10 DIAGNOSIS — I10 ESSENTIAL HYPERTENSION: ICD-10-CM

## 2019-06-10 PROCEDURE — 99309 SBSQ NF CARE MODERATE MDM 30: CPT | Mod: GW | Performed by: FAMILY MEDICINE

## 2019-06-10 NOTE — PROGRESS NOTES
[place] [facility]  Chief Complaint   Patient presents with     detention Regulatory   [place] Medical Record Number:  [id number]  Place of Service where encounter took place:  [facility] ([facility] [[id number]]    HPI:    Sherri Bender is a 86 year old  (6/6/1932), who is being seen today for a federally mandated E/M visit.  HPI information obtained from: facility chart records, facility staff, patient report and [facility] chart review.     Today's concerns are:  - Resident seen and examined. Non verbal,   Reportedly sleep, appetite and BM are fine.   - RN has no concern today.   - GN P has no concerns  --------------------------------  - - Past Medical, social, family histories, medications, and allergies reviewed and updated  - Medications reviewed: in the chart and EHR.   - Case Management:   I have reviewed the care plan and MDS and do agree with the plan. Patient's desire to return to the community is not present.  Information reviewed:  Medications, vital signs, orders, and nursing notes.    MEDICATIONS:  Current Outpatient Medications   Medication Sig Dispense Refill     Acetaminophen (TYLENOL PO) Take 1,000 mg by mouth 3 times daily       bisacodyl (DULCOLAX) 10 MG Suppository Place 10 mg rectally daily as needed for constipation       CRANBERRY PO Take 250 mg by mouth 3 times daily        magnesium hydroxide (MILK OF MAGNESIA) 400 MG/5ML suspension Take 30 mLs by mouth daily as needed for constipation or heartburn       Melatonin 10 MG TABS tablet Take 1 tablet (10 mg) by mouth At Bedtime 30 tablet 11     polyethylene glycol (MIRALAX/GLYCOLAX) Packet Take 17 g by mouth daily       senna-docusate (SENOKOT-S;PERICOLACE) 8.6-50 MG per tablet Take 1 tablet by mouth daily        Sertraline HCl (ZOLOFT PO) Take 50 mg by mouth daily 75mg PO daily        TRAMADOL HCL PO Take 25 mg by mouth 2 times daily And q3 hours PRN       TRAZODONE HCL PO Take 12.5 mg by mouth At Bedtime         ROS: Unobtainable secondary to cognitive impairment.      Exam:  BP Readings from Last 3 Encounters:   04/15/19 148/73   02/11/19 128/72   12/03/18 118/85     Pulse Readings from Last 4 Encounters:   04/15/19 93   02/11/19 87   12/03/18 75   10/04/18 93       GENERAL APPEARANCE:  in no distress  RESP:  lungs clear to auscultation , no respiratory distress  CV:  S1S2 nl, regular rate and rhythm, no murmur, rub, or gallop, no edema  ABDOMEN:  normal bowel sounds, soft, nontender, no hepatosplenomegaly or other masses  NEURO:   no NFD appreciated on observation  PSYCH: lying down in bed, sleeping.     Lab/Diagnostic data: none new to review.     ASSESSMENT/PLAN  Paranoia (H)  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       Systolic congestive heart failure, unspecified congestive heart failure chronicity (H)  HTN, goal below 150/90  - compensated, BP w/i acceptable range.      Frail elderly  - Significant  Deficits requiring NH placement. Requiring extensive assistance from nursing. Up for meals only o/w spends the day resting in bed      Alzheimer's disease of other onset without behavioral disturbance  - off donepezil.   - Continue to anticipate needs. Chronic condition, ongoing decline expected.   -  Continue to provide redirection and reassurance as needed. Maintain safe living situation with goals focused on comfort.    Hospice Care:  - Symptoms managed by [facility] and Hospice Team.    Orders:  - See above, otherwise, continue the rest of the current POC.     Electronically signed by:  Aquiles Hernandez MD

## 2019-08-08 ASSESSMENT — MIFFLIN-ST. JEOR: SCORE: 1152.57

## 2019-08-13 ENCOUNTER — NURSING HOME VISIT (OUTPATIENT)
Dept: GERIATRICS | Facility: CLINIC | Age: 84
End: 2019-08-13
Payer: COMMERCIAL

## 2019-08-13 VITALS
DIASTOLIC BLOOD PRESSURE: 87 MMHG | OXYGEN SATURATION: 94 % | SYSTOLIC BLOOD PRESSURE: 145 MMHG | HEART RATE: 80 BPM | BODY MASS INDEX: 29.23 KG/M2 | HEIGHT: 63 IN | WEIGHT: 165 LBS | RESPIRATION RATE: 16 BRPM | TEMPERATURE: 98 F

## 2019-08-13 DIAGNOSIS — F41.9 ANXIETY: ICD-10-CM

## 2019-08-13 DIAGNOSIS — G47.00 INSOMNIA, UNSPECIFIED TYPE: ICD-10-CM

## 2019-08-13 DIAGNOSIS — G30.1 LATE ONSET ALZHEIMER'S DISEASE WITHOUT BEHAVIORAL DISTURBANCE (H): Primary | ICD-10-CM

## 2019-08-13 DIAGNOSIS — Z51.5 HOSPICE CARE: ICD-10-CM

## 2019-08-13 DIAGNOSIS — M15.0 PRIMARY OSTEOARTHRITIS INVOLVING MULTIPLE JOINTS: ICD-10-CM

## 2019-08-13 DIAGNOSIS — F02.80 LATE ONSET ALZHEIMER'S DISEASE WITHOUT BEHAVIORAL DISTURBANCE (H): Primary | ICD-10-CM

## 2019-08-13 DIAGNOSIS — F32.0 MILD MAJOR DEPRESSION (H): ICD-10-CM

## 2019-08-13 PROCEDURE — 99309 SBSQ NF CARE MODERATE MDM 30: CPT | Mod: GW | Performed by: NURSE PRACTITIONER

## 2019-08-13 RX ORDER — LANOLIN ALCOHOL/MO/W.PET/CERES
3 CREAM (GRAM) TOPICAL AT BEDTIME
COMMUNITY
End: 2019-12-20

## 2019-08-13 RX ORDER — MORPHINE SULFATE 20 MG/5ML
0.25 SOLUTION ORAL 2 TIMES DAILY
COMMUNITY

## 2019-08-13 NOTE — PROGRESS NOTES
[place] [facility]  Chief Complaint   Patient presents with     alf Regulatory     [place] Medical Record Number:  [id number]  Place of Service where encounter took place:  [facility] ([facility] [[id number]]    HPI:    Sherri Bender  is 87 year old (6/6/1932), who is being seen today for a federally mandated E/M visit.  HPI information obtained from: facility chart records, facility staff, patient report and [facility] chart review.     Seeing patient today for a regulatory visit.   Nsg reports patient sleeps most of the day. No sleep concerns at noc.   No anxiety or mood concerns.   Patient non verbal, in broda chair.     ALLERGIES:Nkda [no known drug allergies]  PAST MEDICAL HISTORY:   has a past medical history of Dementia, Depression, Environmental allergies, Family history of ischemic heart disease (2/9/2010), HTN (hypertension), and Osteoarthritis.  PAST SURGICAL HISTORY:   has a past surgical history that includes appendectomy; surgical history of - ; surgical history of - ; tonsillectomy; cataract iol, rt/lt (4/2010); and Colonoscopy (6/20/2011).  FAMILY HISTORY: family history includes Allergies in her brother; Cancer in her brother and mother; Heart Disease in her father and mother.  SOCIAL HISTORY:  reports that she has quit smoking. She has never used smokeless tobacco. She reports that she does not drink alcohol or use drugs.    MEDICATIONS:  Current Outpatient Medications   Medication Sig Dispense Refill     Acetaminophen (TYLENOL PO) Take 1,000 mg by mouth 3 times daily       bisacodyl (DULCOLAX) 10 MG Suppository Place 10 mg rectally daily as needed for constipation       CRANBERRY PO Take 250 mg by mouth 3 times daily        guaiFENesin (ROBITUSSIN) 100 MG/5ML SYRP Take 20 mLs by mouth every 4 hours as needed for cough       magnesium hydroxide (MILK OF MAGNESIA) 400 MG/5ML suspension Take 30 mLs by mouth daily as needed for constipation or heartburn        "melatonin 3 MG tablet Take 3 mg by mouth At Bedtime       morphine sulfate 20 MG/5ML SOLN Take 10 mg by mouth every hour as needed       polyethylene glycol (MIRALAX/GLYCOLAX) Packet Take 17 g by mouth daily       senna-docusate (SENOKOT-S;PERICOLACE) 8.6-50 MG per tablet Take 1 tablet by mouth daily        Sertraline HCl (ZOLOFT PO) Take 50 mg by mouth daily 75mg PO daily        TRAMADOL HCL PO Take 25 mg by mouth 2 times daily        TRAZODONE HCL PO Take 25 mg by mouth At Bedtime          Case Management:  I have reviewed the care plan and MDS and do agree with the plan. Patient's desire to return to the community is not present. Information reviewed:  Medications, vital signs, orders, and nursing notes.    ROS:  Unobtainable secondary to cognitive impairment.     Vitals:  BP (!) 145/87   Pulse 80   Temp 98  F (36.7  C)   Resp 16   Ht 1.6 m (5' 3\")   Wt 74.8 kg (165 lb)   SpO2 94%   BMI 29.23 kg/m    Body mass index is 29.23 kg/m .  Exam:  GENERAL APPEARANCE:  in no distress, somnolent  RESP:  respiratory effort and palpation of chest normal, lungs clear to auscultation   CV:  Palpation and auscultation of heart done , regular rate and rhythm, no murmur, rub, or gallop  ABDOMEN:  no guarding or rebound, bowel sounds normal  SKIN:  Inspection of skin and subcutaneous tissue baseline  PSYCH:  non verbal, calm    Lab/Diagnostic data:   CBC RESULTS:   Recent Labs   Lab Test 10/08/18  1020 08/03/18  0652 01/14/18  1343   WBC  --  5.4 8.2   RBC  --  3.60* 3.79*   HGB 11.4* 11.2* 11.7   HCT  --  35.0 35.3   MCV  --  97 93   MCH  --  31.1 30.9   MCHC  --  32.0 33.1   RDW  --  13.2 13.0   PLT  --  222 226       Last Basic Metabolic Panel:  Recent Labs   Lab Test 10/08/18  1020 08/03/18  0652    140   POTASSIUM 4.2 3.9   CHLORIDE 106 105   ZACK 8.7 8.7   CO2 30 30   BUN 22 23   CR 0.88 0.82   GLC 79 86       Liver Function Studies -   Recent Labs   Lab Test 08/03/18  0652 01/14/18  1343   PROTTOTAL 6.8 6.6* "   ALBUMIN 3.3* 3.3*   BILITOTAL 0.4 0.3   ALKPHOS 57 67   AST 11 18   ALT 17 19       TSH   Date Value Ref Range Status   01/25/2018 2.00 0.40 - 4.00 mU/L Final   09/03/2015 1.79 0.40 - 4.00 mU/L Final   ]    Lab Results   Component Value Date    A1C 5.8 02/16/2012    A1C 5.9 07/19/2011       ASSESSMENT/PLAN  Late onset Alzheimer's disease without behavioral disturbance  Mild major depression (H)  Anxiety  - patient non verbal, poor core strength, needs assistance with all ADL's.   - calm with no signs of anxiety, will reduce zoloft from 75 to 50 mg  - patient is on hospice, goal is comfort    Insomnia, unspecified type  - sleeping most of the day and all noc  - will reduce trazodone from 50 to 25 mg, and decrease melatonin from 10 to 3 mg  - nsg to update if sleep concerns    Primary osteoarthritis involving multiple joints  - on tylenol and ultram, no signs of pain  - nsg to update if pain concerns    Hospice care- [facility]  - no longer following routine labs, goal is comfort      transcribed by : Margret Silva  Orders:  1. Discontinue Ativan  2. Decrease Melatonin to 3 mg at bedt   3. Decrease Zoloft to 50 mg every day      Electronically signed by:  VALERIE Singh CNP

## 2019-08-13 NOTE — LETTER
8/13/2019        RE: Sherri Bender  [address] [place] [address]        [place] [facility]  Chief Complaint   Patient presents with     retirement Regulatory     [place] Medical Record Number:  [id number]  Place of Service where encounter took place:  [facility] ([facility] [[id number]]    HPI:    Sherri Bender  is 87 year old (6/6/1932), who is being seen today for a federally mandated E/M visit.  HPI information obtained from: facility chart records, facility staff, patient report and [facility] chart review.     Seeing patient today for a regulatory visit.   Nsg reports patient sleeps most of the day. No sleep concerns at noc.   No anxiety or mood concerns.   Patient non verbal, in broda chair.     ALLERGIES:Nkda [no known drug allergies]  PAST MEDICAL HISTORY:   has a past medical history of Dementia, Depression, Environmental allergies, Family history of ischemic heart disease (2/9/2010), HTN (hypertension), and Osteoarthritis.  PAST SURGICAL HISTORY:   has a past surgical history that includes appendectomy; surgical history of - ; surgical history of - ; tonsillectomy; cataract iol, rt/lt (4/2010); and Colonoscopy (6/20/2011).  FAMILY HISTORY: family history includes Allergies in her brother; Cancer in her brother and mother; Heart Disease in her father and mother.  SOCIAL HISTORY:  reports that she has quit smoking. She has never used smokeless tobacco. She reports that she does not drink alcohol or use drugs.    MEDICATIONS:  Current Outpatient Medications   Medication Sig Dispense Refill     Acetaminophen (TYLENOL PO) Take 1,000 mg by mouth 3 times daily       bisacodyl (DULCOLAX) 10 MG Suppository Place 10 mg rectally daily as needed for constipation       CRANBERRY PO Take 250 mg by mouth 3 times daily        guaiFENesin (ROBITUSSIN) 100 MG/5ML SYRP Take 20 mLs by mouth every 4 hours as needed for cough       magnesium hydroxide (MILK OF  "MAGNESIA) 400 MG/5ML suspension Take 30 mLs by mouth daily as needed for constipation or heartburn       melatonin 3 MG tablet Take 3 mg by mouth At Bedtime       morphine sulfate 20 MG/5ML SOLN Take 10 mg by mouth every hour as needed       polyethylene glycol (MIRALAX/GLYCOLAX) Packet Take 17 g by mouth daily       senna-docusate (SENOKOT-S;PERICOLACE) 8.6-50 MG per tablet Take 1 tablet by mouth daily        Sertraline HCl (ZOLOFT PO) Take 50 mg by mouth daily 75mg PO daily        TRAMADOL HCL PO Take 25 mg by mouth 2 times daily        TRAZODONE HCL PO Take 25 mg by mouth At Bedtime          Case Management:  I have reviewed the care plan and MDS and do agree with the plan. Patient's desire to return to the community is not present. Information reviewed:  Medications, vital signs, orders, and nursing notes.    ROS:  Unobtainable secondary to cognitive impairment.     Vitals:  BP (!) 145/87   Pulse 80   Temp 98  F (36.7  C)   Resp 16   Ht 1.6 m (5' 3\")   Wt 74.8 kg (165 lb)   SpO2 94%   BMI 29.23 kg/m     Body mass index is 29.23 kg/m .  Exam:  GENERAL APPEARANCE:  in no distress, somnolent  RESP:  respiratory effort and palpation of chest normal, lungs clear to auscultation   CV:  Palpation and auscultation of heart done , regular rate and rhythm, no murmur, rub, or gallop  ABDOMEN:  no guarding or rebound, bowel sounds normal  SKIN:  Inspection of skin and subcutaneous tissue baseline  PSYCH:  non verbal, calm    Lab/Diagnostic data:   CBC RESULTS:   Recent Labs   Lab Test 10/08/18  1020 08/03/18  0652 01/14/18  1343   WBC  --  5.4 8.2   RBC  --  3.60* 3.79*   HGB 11.4* 11.2* 11.7   HCT  --  35.0 35.3   MCV  --  97 93   MCH  --  31.1 30.9   MCHC  --  32.0 33.1   RDW  --  13.2 13.0   PLT  --  222 226       Last Basic Metabolic Panel:  Recent Labs   Lab Test 10/08/18  1020 08/03/18  0652    140   POTASSIUM 4.2 3.9   CHLORIDE 106 105   ZACK 8.7 8.7   CO2 30 30   BUN 22 23   CR 0.88 0.82   GLC 79 86 "       Liver Function Studies -   Recent Labs   Lab Test 08/03/18  0652 01/14/18  1343   PROTTOTAL 6.8 6.6*   ALBUMIN 3.3* 3.3*   BILITOTAL 0.4 0.3   ALKPHOS 57 67   AST 11 18   ALT 17 19       TSH   Date Value Ref Range Status   01/25/2018 2.00 0.40 - 4.00 mU/L Final   09/03/2015 1.79 0.40 - 4.00 mU/L Final   ]    Lab Results   Component Value Date    A1C 5.8 02/16/2012    A1C 5.9 07/19/2011       ASSESSMENT/PLAN  Late onset Alzheimer's disease without behavioral disturbance  Mild major depression (H)  Anxiety  - patient non verbal, poor core strength, needs assistance with all ADL's.   - calm with no signs of anxiety, will reduce zoloft from 75 to 50 mg  - patient is on hospice, goal is comfort    Insomnia, unspecified type  - sleeping most of the day and all noc  - will reduce trazodone from 50 to 25 mg, and decrease melatonin from 10 to 3 mg  - nsg to update if sleep concerns    Primary osteoarthritis involving multiple joints  - on tylenol and ultram, no signs of pain  - nsg to update if pain concerns    Hospice care- [facility]  - no longer following routine labs, goal is comfort      transcribed by : Margret Silva  Orders:  1. Discontinue Ativan  2. Decrease Melatonin to 3 mg at bedt   3. Decrease Zoloft to 50 mg every day      Electronically signed by:  VALERIE Singh CNP            Sincerely,        VALERIE Singh CNP

## 2019-08-16 ENCOUNTER — DOCUMENTATION ONLY (OUTPATIENT)
Dept: OTHER | Facility: CLINIC | Age: 84
End: 2019-08-16

## 2019-10-10 ASSESSMENT — MIFFLIN-ST. JEOR: SCORE: 1143.04

## 2019-10-14 VITALS
RESPIRATION RATE: 20 BRPM | BODY MASS INDEX: 28.86 KG/M2 | HEIGHT: 63 IN | OXYGEN SATURATION: 92 % | SYSTOLIC BLOOD PRESSURE: 153 MMHG | DIASTOLIC BLOOD PRESSURE: 93 MMHG | HEART RATE: 84 BPM | WEIGHT: 162.9 LBS | TEMPERATURE: 97.5 F

## 2019-10-14 NOTE — PROGRESS NOTES
[place] [facility]  Chief Complaint   Patient presents with     [facility] Regulatory   [place] Medical Record Number:  [id number]  Place of Service where encounter took place:  [facility] ([facility] [[id number]]    HPI:    Sherri Bender is a 86 year old  (6/6/1932), who is being seen today for a federally mandated E/M visit.  HPI information obtained from: facility chart records, facility staff, patient report and [facility] chart review.     Today's concerns are:  - Resident seen and examined. Reports trying to stay awake, denies pain, reports feeling tired.    - RN  And GNP has no concern today.   --------------------------------  - - Past Medical, social, family histories, medications, and allergies reviewed and updated  - Medications reviewed: in the chart and EHR.   - Case Management:   I have reviewed the care plan and MDS and do agree with the plan. Patient's desire to return to the community is not present.  Information reviewed:  Medications, vital signs, orders, and nursing notes.    MEDICATIONS:  Current Outpatient Medications   Medication Sig Dispense Refill     Acetaminophen (TYLENOL PO) Take 1,000 mg by mouth 3 times daily       CRANBERRY PO Take 250 mg by mouth 3 times daily        guaiFENesin (ROBITUSSIN) 100 MG/5ML SYRP Take 20 mLs by mouth every 4 hours as needed for cough       melatonin 3 MG tablet Take 3 mg by mouth At Bedtime       morphine sulfate 20 MG/5ML SOLN Take 0.25 mL by mouth every 1 hour as needed       senna-docusate (SENOKOT-S;PERICOLACE) 8.6-50 MG per tablet Take 1 tablet by mouth daily        Sertraline HCl (ZOLOFT PO) Take 50 mg by mouth daily        TRAMADOL HCL PO Take 25 mg by mouth 2 times daily        TRAZODONE HCL PO Take 25 mg by mouth At Bedtime        bisacodyl (DULCOLAX) 10 MG Suppository Place 10 mg rectally daily as needed for constipation       magnesium hydroxide (MILK OF MAGNESIA) 400 MG/5ML suspension Take 30 mLs by mouth daily  as needed for constipation or heartburn       polyethylene glycol (MIRALAX/GLYCOLAX) Packet Take 17 g by mouth daily       ROS: Unobtainable secondary to cognitive impairment except as in HPI     Exam:  BP Readings from Last 3 Encounters:   10/10/19 (!) 153/93   08/08/19 (!) 145/87   04/15/19 148/73     Pulse Readings from Last 4 Encounters:   10/10/19 84   08/08/19 80   04/15/19 93   02/11/19 87       GENERAL APPEARANCE:  tired looking  RESP:  lungs clear to auscultation , no respiratory distress  CV:  S1S2 nl, regular rate and rhythm, no murmur, rub, or gallop, no edema  ABDOMEN:  normal bowel sounds, soft, nontender, no hepatosplenomegaly or other masses  NEURO:   no NFD appreciated on observation  PSYCH: AAOx person only, impaired memory and judgement. Mood tired looking.     Lab/Diagnostic data: none new to review.     ASSESSMENT/PLAN  Paranoia (H)  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       Systolic congestive heart failure, unspecified congestive heart failure chronicity (H)  HTN, essential  - compensated     Frail elderly  - Significant  Deficits requiring NH placement. Requiring extensive assistance from nursing. Up for meals only o/w spends the day resting in bed      Alzheimer's disease of other onset without behavioral disturbance (H)  - off donepezil.   - Continue to anticipate needs. Chronic condition, ongoing decline expected.   -  Continue to provide redirection and reassurance as needed. Maintain safe living situation with goals focused on comfort.    Hospice Care: - Symptoms managed by [facility] and Hospice Team.  hypersomnia: consider drug adjustment.     Orders:  - See above, otherwise, continue the rest of the current POC.     Electronically signed by:  Aquiles Hernandez MD

## 2019-10-15 ENCOUNTER — NURSING HOME VISIT (OUTPATIENT)
Dept: GERIATRICS | Facility: CLINIC | Age: 84
End: 2019-10-15
Payer: COMMERCIAL

## 2019-10-15 DIAGNOSIS — F02.80 LATE ONSET ALZHEIMER'S DISEASE WITHOUT BEHAVIORAL DISTURBANCE (H): ICD-10-CM

## 2019-10-15 DIAGNOSIS — F22 PARANOIA (H): Primary | ICD-10-CM

## 2019-10-15 DIAGNOSIS — F32.0 MILD SINGLE CURRENT EPISODE OF MAJOR DEPRESSIVE DISORDER (H): ICD-10-CM

## 2019-10-15 DIAGNOSIS — I50.22 CHRONIC SYSTOLIC CONGESTIVE HEART FAILURE (H): ICD-10-CM

## 2019-10-15 DIAGNOSIS — Z51.5 HOSPICE CARE: ICD-10-CM

## 2019-10-15 DIAGNOSIS — I10 ESSENTIAL HYPERTENSION: ICD-10-CM

## 2019-10-15 DIAGNOSIS — G30.1 LATE ONSET ALZHEIMER'S DISEASE WITHOUT BEHAVIORAL DISTURBANCE (H): ICD-10-CM

## 2019-10-15 DIAGNOSIS — G47.10 HYPERSOMNIA: ICD-10-CM

## 2019-10-15 PROCEDURE — 99309 SBSQ NF CARE MODERATE MDM 30: CPT | Performed by: FAMILY MEDICINE

## 2019-10-15 NOTE — LETTER
10/15/2019        RE: Sherri Bender  [address] [place] [address]        [place] [facility]  Chief Complaint   Patient presents with     USP Regulatory   [place] Medical Record Number:  [id number]  Place of Service where encounter took place[facility] ([facility] [[id number]]    HPI:    Sherri Bender is a 86 year old  (6/6/1932), who is being seen today for a federally mandated E/M visit.  HPI information obtained from: facility chart records, facility staff, patient report and [facility] chart review.     Today's concerns are:  - Resident seen and examined. Reports trying to stay awake, denies pain, reports feeling tired.    - RN  And GNP has no concern today.   --------------------------------  - - Past Medical, social, family histories, medications, and allergies reviewed and updated  - Medications reviewed: in the chart and EHR.   - Case Management:   I have reviewed the care plan and MDS and do agree with the plan. Patient's desire to return to the community is not present.  Information reviewed:  Medications, vital signs, orders, and nursing notes.    MEDICATIONS:  Current Outpatient Medications   Medication Sig Dispense Refill     Acetaminophen (TYLENOL PO) Take 1,000 mg by mouth 3 times daily       CRANBERRY PO Take 250 mg by mouth 3 times daily        guaiFENesin (ROBITUSSIN) 100 MG/5ML SYRP Take 20 mLs by mouth every 4 hours as needed for cough       melatonin 3 MG tablet Take 3 mg by mouth At Bedtime       morphine sulfate 20 MG/5ML SOLN Take 0.25 mL by mouth every 1 hour as needed       senna-docusate (SENOKOT-S;PERICOLACE) 8.6-50 MG per tablet Take 1 tablet by mouth daily        Sertraline HCl (ZOLOFT PO) Take 50 mg by mouth daily        TRAMADOL HCL PO Take 25 mg by mouth 2 times daily        TRAZODONE HCL PO Take 25 mg by mouth At Bedtime        bisacodyl (DULCOLAX) 10 MG Suppository Place 10 mg rectally daily as needed for  constipation       magnesium hydroxide (MILK OF MAGNESIA) 400 MG/5ML suspension Take 30 mLs by mouth daily as needed for constipation or heartburn       polyethylene glycol (MIRALAX/GLYCOLAX) Packet Take 17 g by mouth daily       ROS: Unobtainable secondary to cognitive impairment except as in HPI     Exam:  BP Readings from Last 3 Encounters:   10/10/19 (!) 153/93   08/08/19 (!) 145/87   04/15/19 148/73     Pulse Readings from Last 4 Encounters:   10/10/19 84   08/08/19 80   04/15/19 93   02/11/19 87       GENERAL APPEARANCE:  tired looking  RESP:  lungs clear to auscultation , no respiratory distress  CV:  S1S2 nl, regular rate and rhythm, no murmur, rub, or gallop, no edema  ABDOMEN:  normal bowel sounds, soft, nontender, no hepatosplenomegaly or other masses  NEURO:   no NFD appreciated on observation  PSYCH: AAOx person only, impaired memory and judgement. Mood tired looking.     Lab/Diagnostic data: none new to review.     ASSESSMENT/PLAN  Paranoia (H)  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       Systolic congestive heart failure, unspecified congestive heart failure chronicity (H)  HTN, essential  - compensated     Frail elderly  - Significant  Deficits requiring NH placement. Requiring extensive assistance from nursing. Up for meals only o/w spends the day resting in bed      Alzheimer's disease of other onset without behavioral disturbance (H)  - off donepezil.   - Continue to anticipate needs. Chronic condition, ongoing decline expected.   -  Continue to provide redirection and reassurance as needed. Maintain safe living situation with goals focused on comfort.    Hospice Care: - Symptoms managed by [facility] and Hospice Team.  hypersomnia: consider drug adjustment.     Orders:  - See above, otherwise, continue the rest of the current POC.     Electronically signed by:  Aquilse Hernandez MD        Sincerely,        Aquiles Hernandez MD

## 2019-12-12 ASSESSMENT — MIFFLIN-ST. JEOR: SCORE: 1134.42

## 2019-12-18 VITALS
BODY MASS INDEX: 28.53 KG/M2 | TEMPERATURE: 98 F | RESPIRATION RATE: 16 BRPM | HEART RATE: 87 BPM | OXYGEN SATURATION: 94 % | DIASTOLIC BLOOD PRESSURE: 98 MMHG | WEIGHT: 161 LBS | SYSTOLIC BLOOD PRESSURE: 154 MMHG | HEIGHT: 63 IN

## 2019-12-18 NOTE — PROGRESS NOTES
[place] [facility]  Chief Complaint   Patient presents with     halfway Regulatory     [place] Medical Record Number:  [id number]  Place of Service where encounter took place[facility] ([facility] [[id number]]    HPI:    Sherri Bender  is 87 year old (6/6/1932), who is being seen today for a federally mandated E/M visit.  HPI information obtained from: facility chart records, facility staff, patient report and [facility] chart review.    Seeing patient today for regulatory visit.  Patient remains on hospice with a goal of comfort.  No reported signs of pain.  No mood, behavior or sleep concerns reported.  No breathing concerns reported.  No bowel or bladder concerns reported.      ALLERGIES:Nkda [no known drug allergies]  PAST MEDICAL HISTORY:   has a past medical history of Dementia, Depression, Environmental allergies, Family history of ischemic heart disease (2/9/2010), HTN (hypertension), and Osteoarthritis.  PAST SURGICAL HISTORY:   has a past surgical history that includes appendectomy; surgical history of - ; surgical history of - ; tonsillectomy; cataract iol, rt/lt (4/2010); and Colonoscopy (6/20/2011).  FAMILY HISTORY: family history includes Allergies in her brother; Cancer in her brother and mother; Heart Disease in her father and mother.  SOCIAL HISTORY:  reports that she has quit smoking. She has never used smokeless tobacco. She reports that she does not drink alcohol or use drugs.    MEDICATIONS:  Current Outpatient Medications   Medication Sig Dispense Refill     Acetaminophen (TYLENOL PO) Take 1,000 mg by mouth 3 times daily       bisacodyl (DULCOLAX) 10 MG Suppository Place 10 mg rectally daily as needed for constipation       CRANBERRY PO Take 250 mg by mouth 3 times daily        guaiFENesin (ROBITUSSIN) 100 MG/5ML SYRP Take 20 mLs by mouth every 4 hours as needed for cough       melatonin 3 MG tablet Take 3 mg by mouth At Bedtime       morphine sulfate 20  "MG/5ML SOLN Take 0.25 mL by mouth every 1 hour as needed       polyethylene glycol (MIRALAX/GLYCOLAX) Packet Take 17 g by mouth daily       senna-docusate (SENOKOT-S;PERICOLACE) 8.6-50 MG per tablet Take 1 tablet by mouth daily        Sertraline HCl (ZOLOFT PO) Take 50 mg by mouth daily        TRAMADOL HCL PO Take 25 mg by mouth 2 times daily        TRAZODONE HCL PO Take 25 mg by mouth At Bedtime        magnesium hydroxide (MILK OF MAGNESIA) 400 MG/5ML suspension Take 30 mLs by mouth daily as needed for constipation or heartburn         Case Management:  I have reviewed the care plan and MDS and do agree with the plan. Patient's desire to return to the community is not assessible due to cognitive impairment. Information reviewed:  Medications, vital signs, orders, and nursing notes.    ROS:  Unobtainable secondary to cognitive impairment.     Vitals:  BP (!) 154/98   Pulse 87   Temp 98  F (36.7  C)   Resp 16   Ht 1.6 m (5' 3\")   Wt 73 kg (161 lb)   SpO2 94%   BMI 28.52 kg/m    Body mass index is 28.52 kg/m .  Exam:  GENERAL APPEARANCE:  in no distress, Asleep in Broda chair  RESP:  lungs clear to auscultation , no respiratory distress  CV:  regular rate and rhythm, no murmur, rub, or gallop, no edema  ABDOMEN:  no guarding or rebound, bowel sounds normal  M/S:   Poor core strength, does not ambulate  SKIN:  Inspection of skin and subcutaneous tissue baseline  PSYCH:  Lethargic, flat affect, nonverbal    Lab/Diagnostic data:   Not found routine labs, patient is on hospice    ASSESSMENT/PLAN  Hypertensive heart disease with chronic systolic congestive heart failure (H)  -Vitals reviewed, blood pressure mildly elevated at times.  Okay with this given advanced age and goal of comfort.  On no cardiac medications    Late onset Alzheimer's disease without behavioral disturbance (H)  Hosp[facility] care- [facility]  -Patient needs assistance with all ADL, has bladder incontinence, does not ambulate and is nonverbal " today  -Staff to anticipate patient needs  -Following routine labs, patient has comfort meds available.  On hospice with [facility]    Mild single current episode of major depressive disorder (H)  -Recent signs of depression, continue Zoloft.  Consider GDR if no ongoing mood concerns    Insomnia, unspecified type  -No reported sleep concerns.  Patient is on both melatonin and trazodone, will stop melatonin.  Will obtain sleep log and have staff update with any sleep concerns    Primary osteoarthritis involving multiple joints  -Patient is on scheduled Toradol and Tylenol.  Has morphine available if pain.  No recent reports or signs of pain    Slow transit constipation  -No bowel concerns reported.  Continue bowel medications. staff to monitor bowels and administer PRN medications if necessary      transcribed by : Margret Silva  Orders:  1. Discontinue Melatonin  2. Ok to stop cranberry supplement if ok w/ family  3. Update NP w/ Sleep concerns        Electronically signed by:  VALERIE Singh CNP

## 2019-12-19 ENCOUNTER — NURSING HOME VISIT (OUTPATIENT)
Dept: GERIATRICS | Facility: CLINIC | Age: 84
End: 2019-12-19
Payer: COMMERCIAL

## 2019-12-19 DIAGNOSIS — G30.1 LATE ONSET ALZHEIMER'S DISEASE WITHOUT BEHAVIORAL DISTURBANCE (H): ICD-10-CM

## 2019-12-19 DIAGNOSIS — I11.0 HYPERTENSIVE HEART DISEASE WITH CHRONIC SYSTOLIC CONGESTIVE HEART FAILURE (H): Primary | ICD-10-CM

## 2019-12-19 DIAGNOSIS — K59.01 SLOW TRANSIT CONSTIPATION: ICD-10-CM

## 2019-12-19 DIAGNOSIS — F02.80 LATE ONSET ALZHEIMER'S DISEASE WITHOUT BEHAVIORAL DISTURBANCE (H): ICD-10-CM

## 2019-12-19 DIAGNOSIS — G47.00 INSOMNIA, UNSPECIFIED TYPE: ICD-10-CM

## 2019-12-19 DIAGNOSIS — I50.22 HYPERTENSIVE HEART DISEASE WITH CHRONIC SYSTOLIC CONGESTIVE HEART FAILURE (H): Primary | ICD-10-CM

## 2019-12-19 DIAGNOSIS — Z51.5 HOSPICE CARE: ICD-10-CM

## 2019-12-19 DIAGNOSIS — F32.0 MILD SINGLE CURRENT EPISODE OF MAJOR DEPRESSIVE DISORDER (H): ICD-10-CM

## 2019-12-19 DIAGNOSIS — M15.0 PRIMARY OSTEOARTHRITIS INVOLVING MULTIPLE JOINTS: ICD-10-CM

## 2019-12-19 DIAGNOSIS — G47.09 OTHER INSOMNIA: ICD-10-CM

## 2019-12-19 PROCEDURE — 99309 SBSQ NF CARE MODERATE MDM 30: CPT | Performed by: NURSE PRACTITIONER

## 2019-12-19 NOTE — LETTER
12/19/2019        RE: Sherri Bender  [address] [place] [address]        [place] [facility]  Chief Complaint   Patient presents with     detention Regulatory     [place] Medical Record Number:  [id number]  Place of Service where encounter took place:  [facility] [[id number]]    HPI:    Sherri Bender  is 87 year old (6/6/1932), who is being seen today for a federally mandated E/M visit.  HPI information obtained from: facility chart records, facility staff, patient report and [facility] chart review.    Seeing patient today for regulatory visit.  Patient remains on hospice with a goal of comfort.  No reported signs of pain.  No mood, behavior or sleep concerns reported.  No breathing concerns reported.  No bowel or bladder concerns reported.      ALLERGIES:Nkda [no known drug allergies]  PAST MEDICAL HISTORY:   has a past medical history of Dementia, Depression, Environmental allergies, Family history of ischemic heart disease (2/9/2010), HTN (hypertension), and Osteoarthritis.  PAST SURGICAL HISTORY:   has a past surgical history that includes appendectomy; surgical history of - ; surgical history of - ; tonsillectomy; cataract iol, rt/lt (4/2010); and Colonoscopy (6/20/2011).  FAMILY HISTORY: family history includes Allergies in her brother; Cancer in her brother and mother; Heart Disease in her father and mother.  SOCIAL HISTORY:  reports that she has quit smoking. She has never used smokeless tobacco. She reports that she does not drink alcohol or use drugs.    MEDICATIONS:  Current Outpatient Medications   Medication Sig Dispense Refill     Acetaminophen (TYLENOL PO) Take 1,000 mg by mouth 3 times daily       bisacodyl (DULCOLAX) 10 MG Suppository Place 10 mg rectally daily as needed for constipation       CRANBERRY PO Take 250 mg by mouth 3 times daily        guaiFENesin (ROBITUSSIN) 100 MG/5ML SYRP Take 20 mLs by mouth every 4 hours as  "needed for cough       melatonin 3 MG tablet Take 3 mg by mouth At Bedtime       morphine sulfate 20 MG/5ML SOLN Take 0.25 mL by mouth every 1 hour as needed       polyethylene glycol (MIRALAX/GLYCOLAX) Packet Take 17 g by mouth daily       senna-docusate (SENOKOT-S;PERICOLACE) 8.6-50 MG per tablet Take 1 tablet by mouth daily        Sertraline HCl (ZOLOFT PO) Take 50 mg by mouth daily        TRAMADOL HCL PO Take 25 mg by mouth 2 times daily        TRAZODONE HCL PO Take 25 mg by mouth At Bedtime        magnesium hydroxide (MILK OF MAGNESIA) 400 MG/5ML suspension Take 30 mLs by mouth daily as needed for constipation or heartburn         Case Management:  I have reviewed the care plan and MDS and do agree with the plan. Patient's desire to return to the community is not assessible due to cognitive impairment. Information reviewed:  Medications, vital signs, orders, and nursing notes.    ROS:  Unobtainable secondary to cognitive impairment.     Vitals:  BP (!) 154/98   Pulse 87   Temp 98  F (36.7  C)   Resp 16   Ht 1.6 m (5' 3\")   Wt 73 kg (161 lb)   SpO2 94%   BMI 28.52 kg/m     Body mass index is 28.52 kg/m .  Exam:  GENERAL APPEARANCE:  in no distress, Asleep in Broda chair  RESP:  lungs clear to auscultation , no respiratory distress  CV:  regular rate and rhythm, no murmur, rub, or gallop, no edema  ABDOMEN:  no guarding or rebound, bowel sounds normal  M/S:   Poor core strength, does not ambulate  SKIN:  Inspection of skin and subcutaneous tissue baseline  PSYCH:  Lethargic, flat affect, nonverbal    Lab/Diagnostic data:   Not found routine labs, patient is on hospice    ASSESSMENT/PLAN  Hypertensive heart disease with chronic systolic congestive heart failure (H)  -Vitals reviewed, blood pressure mildly elevated at times.  Okay with this given advanced age and goal of comfort.  On no cardiac medications    Late onset Alzheimer's disease without behavioral disturbance (H)  [facility]  -Patient " needs assistance with all ADL, has bladder incontinence, does not ambulate and is nonverbal today  -Staff to anticipate patient needs  -Following routine labs, patient has comfort meds available.  On hospice with [facility]    Mild single current episode of major depressive disorder (H)  -Recent signs of depression, continue Zoloft.  Consider GDR if no ongoing mood concerns    Insomnia, unspecified type  -No reported sleep concerns.  Patient is on both melatonin and trazodone, will stop melatonin.  Will obtain sleep log and have staff update with any sleep concerns    Primary osteoarthritis involving multiple joints  -Patient is on scheduled Toradol and Tylenol.  Has morphine available if pain.  No recent reports or signs of pain    Slow transit constipation  -No bowel concerns reported.  Continue bowel medications. staff to monitor bowels and administer PRN medications if necessary      transcribed by : Margret Silva  Orders:  1. Discontinue Melatonin  2. Ok to stop cranberry supplement if ok w/ family  3. Update NP w/ Sleep concerns        Electronically signed by:  VALERIE Singh CNP            Sincerely,        VALERIE Singh CNP

## 2020-01-30 ASSESSMENT — MIFFLIN-ST. JEOR: SCORE: 1125.35

## 2020-02-03 VITALS
WEIGHT: 159 LBS | OXYGEN SATURATION: 94 % | DIASTOLIC BLOOD PRESSURE: 84 MMHG | TEMPERATURE: 97.9 F | SYSTOLIC BLOOD PRESSURE: 148 MMHG | RESPIRATION RATE: 18 BRPM | HEART RATE: 86 BPM | HEIGHT: 63 IN | BODY MASS INDEX: 28.17 KG/M2

## 2020-02-03 NOTE — PROGRESS NOTES
[place] [facility]  Chief Complaint   Patient presents with     snf Regulatory   [place] Medical Record Number:  [id number]  Place of Service where encounter took place:  [facility] [[id number]]    HPI:    Sherri Bender is a 86 year old  (6/6/1932), who is being seen today for a federally mandated E/M visit.  HPI information obtained from: facility chart records, facility staff, patient report and [facility] chart review.     Today's concerns are:  - Resident seen and examined. RN reports trazodone recently increased. Hospice nurse reports Resident has been stable.   --------------------------------  - - Past Medical, social, family histories, medications, and allergies reviewed and updated  - Medications reviewed: in the chart and EHR.   - Case Management:   I have reviewed the care plan and MDS and do agree with the plan. Patient's desire to return to the community is not present.  Information reviewed:  Medications, vital signs, orders, and nursing notes.    MEDICATIONS:  Current Outpatient Medications   Medication Sig Dispense Refill     Acetaminophen (TYLENOL PO) Take 1,000 mg by mouth 3 times daily       bisacodyl (DULCOLAX) 10 MG Suppository Place 10 mg rectally daily as needed for constipation       CRANBERRY PO Take 250 mg by mouth 3 times daily        guaiFENesin (ROBITUSSIN) 100 MG/5ML SYRP Take 20 mLs by mouth every 4 hours as needed for cough       morphine sulfate 20 MG/5ML SOLN Take 0.25 mL by mouth every 1 hour as needed       polyethylene glycol (MIRALAX/GLYCOLAX) Packet Take 17 g by mouth daily       senna-docusate (SENOKOT-S;PERICOLACE) 8.6-50 MG per tablet Take 1 tablet by mouth daily        Sertraline HCl (ZOLOFT PO) Take 50 mg by mouth daily        TRAMADOL HCL PO Take 25 mg by mouth 2 times daily        TRAZODONE HCL PO Take 25 mg by mouth At Bedtime      ROS: Unobtainable secondary to cognitive impairment except as in HPI  Exam:  BP  Readings from Last 3 Encounters:   01/30/20 (!) 148/84   12/12/19 (!) 154/98   10/10/19 (!) 153/93     Pulse Readings from Last 4 Encounters:   01/30/20 86   12/12/19 87   10/10/19 84   08/08/19 80   GENERAL APPEARANCE:  tired looking  RESP:  lungs clear to auscultation , no respiratory distress  CV:  S1S2 nl, regular rate and rhythm, no murmur, rub, or gallop, no edema  ABDOMEN:  normal bowel sounds, soft, nontender, no hepatosplenomegaly or other masses  NEURO:   no NFD appreciated on observation  PSYCH: pleasantly confused.      Lab/Diagnostic data: none new to review.     ASSESSMENT/PLAN  ---------------------------------  Alzheimer's disease of other onset without behavioral disturbance (H)  - off donepezil.   - Continue to anticipate needs. Chronic condition, ongoing decline expected.   -  Continue to provide redirection and reassurance as needed. Maintain safe living situation with goals focused on comfort.    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       Systolic congestive heart failure, unspecified congestive heart failure chronicity (H)  HTN, essential  - compensated     Frail elderly:  Significant  Deficits requiring NH placement. Requiring extensive assistance from nursing. Up for meals only o/w spends the day resting in bed    Hospice Care:  Appreciate collaboration with  hospice team for symptom management in collaboration with cares for maximum comfort at end-of-life    Orders:  - See above, otherwise, continue the rest of the current POC.     Electronically signed by:  Aquiles Hernandez MD

## 2020-02-04 ENCOUNTER — NURSING HOME VISIT (OUTPATIENT)
Dept: GERIATRICS | Facility: CLINIC | Age: 85
End: 2020-02-04
Payer: COMMERCIAL

## 2020-02-04 DIAGNOSIS — Z51.5 HOSPICE CARE: ICD-10-CM

## 2020-02-04 DIAGNOSIS — R54 FRAIL ELDERLY: ICD-10-CM

## 2020-02-04 DIAGNOSIS — F32.0 MILD MAJOR DEPRESSION (H): ICD-10-CM

## 2020-02-04 DIAGNOSIS — F22 PARANOIA (H): ICD-10-CM

## 2020-02-04 DIAGNOSIS — I50.22 CHRONIC SYSTOLIC CONGESTIVE HEART FAILURE (H): ICD-10-CM

## 2020-02-04 DIAGNOSIS — G30.8 ALZHEIMER'S DISEASE OF OTHER ONSET WITHOUT BEHAVIORAL DISTURBANCE: Primary | ICD-10-CM

## 2020-02-04 DIAGNOSIS — F02.80 ALZHEIMER'S DISEASE OF OTHER ONSET WITHOUT BEHAVIORAL DISTURBANCE: Primary | ICD-10-CM

## 2020-02-04 PROCEDURE — 99309 SBSQ NF CARE MODERATE MDM 30: CPT | Mod: GV | Performed by: FAMILY MEDICINE

## 2020-02-04 PROCEDURE — 99207 ZZC CDG-CUT & PASTE-POTENTIAL IMPACT ON LEVEL: CPT | Performed by: FAMILY MEDICINE

## 2020-02-04 NOTE — LETTER
2/4/2020        RE: Sherri Bender  [address] [place] [address]        [place] [facility]  Chief Complaint   Patient presents with     longterm Regulatory   [place] Medical Record Number:  [id number]  Place of Service where encounter took place:  [facility] [[id number]]    HPI:    Sherri Bender is a 86 year old  (6/6/1932), who is being seen today for a federally mandated E/M visit.  HPI information obtained from: facility chart records, facility staff, patient report and [facility] chart review.     Today's concerns are:  - Resident seen and examined. RN reports trazodone recently increased. Hospice nurse reports Resident has been stable.   --------------------------------  - - Past Medical, social, family histories, medications, and allergies reviewed and updated  - Medications reviewed: in the chart and EHR.   - Case Management:   I have reviewed the care plan and MDS and do agree with the plan. Patient's desire to return to the community is not present.  Information reviewed:  Medications, vital signs, orders, and nursing notes.    MEDICATIONS:  Current Outpatient Medications   Medication Sig Dispense Refill     Acetaminophen (TYLENOL PO) Take 1,000 mg by mouth 3 times daily       bisacodyl (DULCOLAX) 10 MG Suppository Place 10 mg rectally daily as needed for constipation       CRANBERRY PO Take 250 mg by mouth 3 times daily        guaiFENesin (ROBITUSSIN) 100 MG/5ML SYRP Take 20 mLs by mouth every 4 hours as needed for cough       morphine sulfate 20 MG/5ML SOLN Take 0.25 mL by mouth every 1 hour as needed       polyethylene glycol (MIRALAX/GLYCOLAX) Packet Take 17 g by mouth daily       senna-docusate (SENOKOT-S;PERICOLACE) 8.6-50 MG per tablet Take 1 tablet by mouth daily        Sertraline HCl (ZOLOFT PO) Take 50 mg by mouth daily        TRAMADOL HCL PO Take 25 mg by mouth 2 times daily        TRAZODONE HCL PO Take 25 mg by mouth At  Bedtime      ROS: Unobtainable secondary to cognitive impairment except as in HPI  Exam:  BP Readings from Last 3 Encounters:   01/30/20 (!) 148/84   12/12/19 (!) 154/98   10/10/19 (!) 153/93     Pulse Readings from Last 4 Encounters:   01/30/20 86   12/12/19 87   10/10/19 84   08/08/19 80   GENERAL APPEARANCE:  tired looking  RESP:  lungs clear to auscultation , no respiratory distress  CV:  S1S2 nl, regular rate and rhythm, no murmur, rub, or gallop, no edema  ABDOMEN:  normal bowel sounds, soft, nontender, no hepatosplenomegaly or other masses  NEURO:   no NFD appreciated on observation  PSYCH: pleasantly confused.      Lab/Diagnostic data: none new to review.     ASSESSMENT/PLAN  ---------------------------------  Alzheimer's disease of other onset without behavioral disturbance (H)  - off donepezil.   - Continue to anticipate needs. Chronic condition, ongoing decline expected.   -  Continue to provide redirection and reassurance as needed. Maintain safe living situation with goals focused on comfort.    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       Systolic congestive heart failure, unspecified congestive heart failure chronicity (H)  HTN, essential  - compensated     Frail elderly:  Significant  Deficits requiring NH placement. Requiring extensive assistance from nursing. Up for meals only o/w spends the day resting in bed    Hospice Care:  Appreciate collaboration with  hospice team for symptom management in collaboration with cares for maximum comfort at end-of-life    Orders:  - See above, otherwise, continue the rest of the current POC.     Electronically signed by:  Aquiles Hernandez MD        Sincerely,        Aquiles Hernandez MD

## 2020-04-07 ENCOUNTER — VIRTUAL VISIT (OUTPATIENT)
Dept: GERIATRICS | Facility: CLINIC | Age: 85
End: 2020-04-07
Payer: COMMERCIAL

## 2020-04-07 VITALS
TEMPERATURE: 98.5 F | DIASTOLIC BLOOD PRESSURE: 84 MMHG | HEART RATE: 81 BPM | OXYGEN SATURATION: 92 % | WEIGHT: 166.1 LBS | BODY MASS INDEX: 29.42 KG/M2 | SYSTOLIC BLOOD PRESSURE: 129 MMHG | RESPIRATION RATE: 16 BRPM

## 2020-04-07 DIAGNOSIS — K59.01 SLOW TRANSIT CONSTIPATION: ICD-10-CM

## 2020-04-07 DIAGNOSIS — Z51.5 HOSPICE CARE: ICD-10-CM

## 2020-04-07 DIAGNOSIS — I50.22 HYPERTENSIVE HEART DISEASE WITH CHRONIC SYSTOLIC CONGESTIVE HEART FAILURE (H): ICD-10-CM

## 2020-04-07 DIAGNOSIS — F22 PARANOIA (H): ICD-10-CM

## 2020-04-07 DIAGNOSIS — F02.80 ALZHEIMER'S DISEASE OF OTHER ONSET WITHOUT BEHAVIORAL DISTURBANCE: Primary | ICD-10-CM

## 2020-04-07 DIAGNOSIS — F32.0 MILD MAJOR DEPRESSION (H): ICD-10-CM

## 2020-04-07 DIAGNOSIS — G47.00 INSOMNIA, UNSPECIFIED TYPE: ICD-10-CM

## 2020-04-07 DIAGNOSIS — G30.8 ALZHEIMER'S DISEASE OF OTHER ONSET WITHOUT BEHAVIORAL DISTURBANCE: Primary | ICD-10-CM

## 2020-04-07 DIAGNOSIS — M15.0 PRIMARY OSTEOARTHRITIS INVOLVING MULTIPLE JOINTS: ICD-10-CM

## 2020-04-07 DIAGNOSIS — I11.0 HYPERTENSIVE HEART DISEASE WITH CHRONIC SYSTOLIC CONGESTIVE HEART FAILURE (H): ICD-10-CM

## 2020-04-07 PROCEDURE — 99309 SBSQ NF CARE MODERATE MDM 30: CPT | Mod: GT | Performed by: NURSE PRACTITIONER

## 2020-04-07 NOTE — PROGRESS NOTES
"[place] [facility] Regulatory   Sherri Bender is being evaluated via a billable video visit due to the restrictions of the Covid-19 pandemic.   The patient has been notified of following:      \"This video visit will be conducted via a call between you and your provider. We have found that certain health care needs can be provided without the need for an in-person physical exam.  This service lets us provide the care you need with a video conversation. If during the course of the call the provider feels a video visit is not appropriate, you will not be charged for this service.\"   The provider has received verbal consent for a Video Visit from the patient or first contact? Yes  Patient  or facility staff would like the video invitation sent by: N/A   Video Start Time: 0909  [place] Medical Record Number:  [id number]  Place of Location at the time of visit: [facility]  Chief Complaint   Patient presents with     prison Regulatory     The health plan new enrollment has happened. I have reviewed the  MDS, the preventative needs,  and facility care plan. The level of care is appropriate. I have reviewed the code status/advanced directives.       HPI:  Sherri Bender  is a 87 year old (6/6/1932), who is being seen today for an E-REG visit.  HPI information obtained from: facility chart records, facility staff, patient report and [facility] chart review.     Case Management and Team Discussion:  I called and spoke with Nursing staff at the facility regarding the current Plan of Care. I have reviewed the facility/SNF care plan/MDS, including the falls risk, nutrition and pain screening. I also reviewed the current immunizations, and preventive care.Patient's desire to return to the community is not assessible due to cognitive impairment. Current Level of Care is appropriate at this time. The Plan of Care is appropriate at this time.     Advance Directive Discussion:    I " reviewed the current advanced directives as reflected in EPIC, the POLST and the facility chart, and verified the congruency of orders. I contacted the first party and left a message regarding the plan of Care.  I did review the advance directives with the resident.       Seeing patient today for a virtual annual visit.   No nsg concerns reported. Patient has been sleeping well. No signs of depression. Patient is tolerating mechanical soft diet with no swallowing concerns. No reported bowel or bladder concerns. No fever, chills or cough.     Past Medical and Surgical History reviewed in Epic today.  MEDICATIONS:    Current Outpatient Medications   Medication Sig Dispense Refill     Acetaminophen (TYLENOL PO) Take 1,000 mg by mouth 3 times daily       bisacodyl (DULCOLAX) 10 MG Suppository Place 10 mg rectally daily as needed for constipation       guaiFENesin (ROBITUSSIN) 100 MG/5ML SYRP Take 20 mLs by mouth every 4 hours as needed for cough       morphine sulfate 20 MG/5ML SOLN Take 0.25 mL by mouth every 1 hour as needed       polyethylene glycol (MIRALAX/GLYCOLAX) Packet Take 17 g by mouth daily       senna-docusate (SENOKOT-S;PERICOLACE) 8.6-50 MG per tablet Take 1 tablet by mouth daily        Sertraline HCl (ZOLOFT PO) Take 25 mg by mouth daily       TRAMADOL HCL PO Take 25 mg by mouth 2 times daily        TRAZODONE HCL PO Take 25 mg by mouth At Bedtime        REVIEW OF SYSTEMS: Unobtainable secondary to cognitive impairment.   Objective: /84   Pulse 81   Temp 98.5  F (36.9  C)   Resp 16   Wt 75.3 kg (166 lb 1.6 oz)   SpO2 92%   BMI 29.42 kg/m    Limited visit exam done given COVID-19 precautions.   Seen via tele health  General: Pleasant, seen in patient's room, in no distress  HEENT: Head is normocephalic, Ears and nose normal appearance, no external lip lesions  CV: unable to be examined due to tele health visit, no edema  RESP: Normal respiratory effort, non labored breathing, no coughing during  exam  GI: not able to assess with virtual visit  MS: Moves all extremities    NEURO: no tremor in extremities  Psych: alert, affect euthymic, speech is normal    Labs:   not following routine labs, pt is on hospice.     ASSESSMENT/PLAN:  Alzheimer's disease of other onset without behavioral disturbance (H)  Paranoia (H)  Insomnia, unspecified type  Mild major depression (H)  Hosp[facility] care- [facility]  - needs assistance with all ADL's. Wt's stable. Not ambulatory. On hospice with a goal of comfort.   - zoloft GDR about a month ago with no increase in signs of depression  - sleeping well, will GDR trazodone from 50 to 25. Melatonin was stopped 12/19.   - nsg or hospice to update with concerns.     Hypertensive heart disease with chronic systolic congestive heart failure (H)  -Vitals reviewed, blood pressure mildly elevated at times.  Okay with this given advanced age and goal of comfort.  On no cardiac medications    Primary osteoarthritis involving multiple joints  -Patient is on scheduled Toradol and Tylenol.  Has morphine available if pain.  No recent reports or signs of pain    Slow transit constipation  -No bowel concerns reported.  Continue bowel medications. staff to monitor bowels and administer PRN medications if necessary    Orders:   Decrease Trazodone to 25 mg Q HS    The health plan new enrollment has happened. I have reviewed the  MDS, the preventative needs,  and facility care plan. The level of care is appropriate. I have reviewed the code status/advanced directives.     Electronically signed by:  VALERIE Singh CNP     Video-Visit Details  Type of service:  Video Visit  Video End Time (time video stopped): 0915  Distant Location (provider location):  [facility]

## 2020-04-07 NOTE — LETTER
"    4/7/2020        RE: [place]  [address] [place] [id number]        [place] [facility] Regulatory   Sherri Bender is being evaluated via a billable video visit due to the restrictions of the Covid-19 pandemic.   The patient has been notified of following:      \"This video visit will be conducted via a call between you and your provider. We have found that certain health care needs can be provided without the need for an in-person physical exam.  This service lets us provide the care you need with a video conversation. If during the course of the call the provider feels a video visit is not appropriate, you will not be charged for this service.\"   The provider has received verbal consent for a Video Visit from the patient or first contact? Yes  Patient  or facility staff would like the video invitation sent by: N/A   Video Start Time: 0909  [place] Medical Record Number:  [id number]  Place of Location at the time of visit: [facility]  Chief Complaint   Patient presents with     FDC Regulatory     HPI:  Sherri Bender  is a 87 year old (6/6/1932), who is being seen today for an E-REG visit.  HPI information obtained from: facility chart records, facility staff, patient report and [facility] chart review.     Case Management and Team Discussion:  I called and spoke with Nursing staff at the facility regarding the current Plan of Care. I have reviewed the facility/SNF care plan/MDS, including the falls risk, nutrition and pain screening. I also reviewed the current immunizations, and preventive care.Patient's desire to return to the community is not assessible due to cognitive impairment. Current Level of Care is appropriate at this time. The Plan of Care is appropriate at this time.     Advance Directive Discussion:    I reviewed the current advanced directives as reflected in EPIC, the POLST and the facility " chart, and verified the congruency of orders. I contacted the first party and left a message regarding the plan of Care.  I did review the advance directives with the resident.       Seeing patient today for a virtual annual visit.   No nsg concerns reported. Patient has been sleeping well. No signs of depression. Patient is tolerating mechanical soft diet with no swallowing concerns. No reported bowel or bladder concerns. No fever, chills or cough.     Past Medical and Surgical History reviewed in Epic today.  MEDICATIONS:    Current Outpatient Medications   Medication Sig Dispense Refill     Acetaminophen (TYLENOL PO) Take 1,000 mg by mouth 3 times daily       bisacodyl (DULCOLAX) 10 MG Suppository Place 10 mg rectally daily as needed for constipation       guaiFENesin (ROBITUSSIN) 100 MG/5ML SYRP Take 20 mLs by mouth every 4 hours as needed for cough       morphine sulfate 20 MG/5ML SOLN Take 0.25 mL by mouth every 1 hour as needed       polyethylene glycol (MIRALAX/GLYCOLAX) Packet Take 17 g by mouth daily       senna-docusate (SENOKOT-S;PERICOLACE) 8.6-50 MG per tablet Take 1 tablet by mouth daily        Sertraline HCl (ZOLOFT PO) Take 25 mg by mouth daily       TRAMADOL HCL PO Take 25 mg by mouth 2 times daily        TRAZODONE HCL PO Take 25 mg by mouth At Bedtime        REVIEW OF SYSTEMS: Unobtainable secondary to cognitive impairment.   Objective: /84   Pulse 81   Temp 98.5  F (36.9  C)   Resp 16   Wt 75.3 kg (166 lb 1.6 oz)   SpO2 92%   BMI 29.42 kg/m    Limited visit exam done given COVID-19 precautions.   Seen via tele health  General: Pleasant, seen in patient's room, in no distress  HEENT: Head is normocephalic, Ears and nose normal appearance, no external lip lesions  CV: unable to be examined due to tele health visit, no edema  RESP: Normal respiratory effort, non labored breathing, no coughing during exam  GI: not able to assess with virtual visit  MS: Moves all extremities    NEURO: no  tremor in extremities  Psych: alert, affect euthymic, speech is normal    Labs:   not following routine labs, pt is on hospice.     ASSESSMENT/PLAN:  Alzheimer's disease of other onset without behavioral disturbance (H)  Paranoia (H)  Insomnia, unspecified type  Mild major depression (H)  [facility]osp[facility] care- [facility]  - needs assistance with all ADL's. Wt's stable. Not ambulatory. On hospice with a goal of comfort.   - zoloft GDR about a month ago with no increase in signs of depression  - sleeping well, will GDR trazodone from 50 to 25. Melatonin was stopped 12/19.   - nsg or hospice to update with concerns.     Hypertensive heart disease with chronic systolic congestive heart failure (H)  -Vitals reviewed, blood pressure mildly elevated at times.  Okay with this given advanced age and goal of comfort.  On no cardiac medications    Primary osteoarthritis involving multiple joints  -Patient is on scheduled Toradol and Tylenol.  Has morphine available if pain.  No recent reports or signs of pain    Slow transit constipation  -No bowel concerns reported.  Continue bowel medications. staff to monitor bowels and administer PRN medications if necessary    Orders:   Decrease Trazodone to 25 mg Q HS    Electronically signed by:  VALERIE Singh CNP     Video-Visit Details  Type of service:  Video Visit  Video End Time (time video stopped): 0915  Distant Location (provider location):  [place] [facility]             Sincerely,        VALERIE Singh CNP

## 2020-06-05 NOTE — PROGRESS NOTES
"[place] [facility] Regulatory   Sherri Bender is being evaluated via a billable video visit due to the restrictions of the Covid-19 pandemic.   The patient has been notified of following:  \"This video visit will be conducted via a call between you and your provider. We have found that certain health care needs can be provided without the need for an in-person physical exam.  This service lets us provide the care you need with a video conversation. If during the course of the call the provider feels a video visit is not appropriate, you will not be charged for this service.\"   The provider has received verbal consent for a Video Visit from the patient or first contact? Yes  Patient or facility staff would like the video invitation sent by: N/A   Video Start Time: 11:39  [place] Medical Record Number:  [id number]  Place of Location at the time of visit: [facility]  Chief Complaint   Patient presents with     Nursing Home Regulatory   HPI:    Sherri Bender is a 86 year old  (6/6/1932), who is being seen today for a federally mandated E/M visit.  HPI information obtained from: facility chart records, facility staff, patient report and [place] Epic chart review.     Today's concerns are:  - Pt seen in the presence of RN who graciously assisted with the virtual visit  -  RN reports Resident continued to be enrolled in hospice, mood has been stable.     --------------------------------  - - Past Medical, social, family histories, medications, and allergies reviewed and updated  - Medications reviewed: in the chart and EHR.   - Case Management:   I have reviewed the care plan and MDS and do agree with the plan. Patient's desire to return to the community is not present.  Information reviewed:  Medications, vital signs, orders, and nursing notes.    MEDICATIONS:  Current Outpatient Medications   Medication Sig Dispense Refill     Acetaminophen (TYLENOL PO) Take 1,000 mg by mouth 3 times " daily       bisacodyl (DULCOLAX) 10 MG Suppository Place 10 mg rectally daily as needed for constipation       guaiFENesin (ROBITUSSIN) 100 MG/5ML SYRP Take 20 mLs by mouth every 4 hours as needed for cough       morphine sulfate 20 MG/5ML SOLN Take 0.25 mL by mouth every 1 hour as needed       polyethylene glycol (MIRALAX/GLYCOLAX) Packet Take 17 g by mouth daily       senna-docusate (SENOKOT-S;PERICOLACE) 8.6-50 MG per tablet Take 1 tablet by mouth daily        Sertraline HCl (ZOLOFT PO) Take 25 mg by mouth daily       TRAMADOL HCL PO Take 25 mg by mouth 2 times daily        TRAZODONE HCL PO Take 25 mg by mouth At Bedtime      ROS: Unobtainable secondary to cognitive impairment except as in HPI  Exam:  BP Readings from Last 3 Encounters:   06/07/20 (!) 137/98   04/07/20 129/84   01/30/20 (!) 148/84     Pulse Readings from Last 4 Encounters:   06/07/20 94   04/07/20 81   01/30/20 86   12/12/19 87   Limited visit exam done given COVID-19 precautions.   GENERAL APPEARANCE: no distress   RESP:  unlabored breathing  SKIN: thin.   NEURO:   no NFD appreciated on observation  PSYCH: pleasantly confused.      Lab/Diagnostic data: none new to review.     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  on sertraline 25 mg, stable.  - Continue to anticipate needs. Chronic condition, ongoing decline expected.   -  Continue to provide redirection and reassurance as needed. Maintain safe living situation with goals focused on comfort.      Systolic congestive heart failure, unspecified congestive heart failure chronicity (H)  HTN, essential  - compensated     Frail elderly:  Significant  Deficits requiring NH placement. Requiring extensive assistance from nursing. Up for meals only o/w spends the day resting in bed      Hospice Care:  Appreciate collaboration with   hospice team for symptom management in collaboration with cares for maximum comfort at end-of-life    Orders: See above, otherwise, continue the rest of the current POC.     Electronically signed by:  Aquiles Hernandez MD    Video-Visit Details  Type of service:  Video Visit  Video End Time (time video stopped): 11:41  Distant Location (provider location):  [facility]

## 2020-06-07 ASSESSMENT — MIFFLIN-ST. JEOR: SCORE: 1128.97

## 2020-06-08 ENCOUNTER — VIRTUAL VISIT (OUTPATIENT)
Dept: GERIATRICS | Facility: CLINIC | Age: 85
End: 2020-06-08
Payer: COMMERCIAL

## 2020-06-08 VITALS
RESPIRATION RATE: 18 BRPM | BODY MASS INDEX: 28.51 KG/M2 | HEIGHT: 63 IN | TEMPERATURE: 99.1 F | SYSTOLIC BLOOD PRESSURE: 137 MMHG | DIASTOLIC BLOOD PRESSURE: 98 MMHG | HEART RATE: 94 BPM | WEIGHT: 160.9 LBS | OXYGEN SATURATION: 97 %

## 2020-06-08 DIAGNOSIS — I50.22 CHRONIC SYSTOLIC CONGESTIVE HEART FAILURE (H): ICD-10-CM

## 2020-06-08 DIAGNOSIS — G30.8 ALZHEIMER'S DISEASE OF OTHER ONSET WITHOUT BEHAVIORAL DISTURBANCE: Primary | ICD-10-CM

## 2020-06-08 DIAGNOSIS — F32.0 MILD MAJOR DEPRESSION (H): ICD-10-CM

## 2020-06-08 DIAGNOSIS — I10 ESSENTIAL HYPERTENSION: ICD-10-CM

## 2020-06-08 DIAGNOSIS — F22 PARANOIA (H): ICD-10-CM

## 2020-06-08 DIAGNOSIS — Z51.5 HOSPICE CARE: ICD-10-CM

## 2020-06-08 DIAGNOSIS — F02.80 ALZHEIMER'S DISEASE OF OTHER ONSET WITHOUT BEHAVIORAL DISTURBANCE: Primary | ICD-10-CM

## 2020-06-08 PROCEDURE — 99207 ZZC CDG-MDM COMPONENT: MEETS LOW - DOWN CODED: CPT | Performed by: FAMILY MEDICINE

## 2020-06-08 PROCEDURE — 99308 SBSQ NF CARE LOW MDM 20: CPT | Mod: GT | Performed by: FAMILY MEDICINE

## 2020-06-08 NOTE — LETTER
"    6/8/2020        RE: Sherri Gustafson  [address] [facility] [address]        [place] [facility] Regulatory   Sherri Bender is being evaluated via a billable video visit due to the restrictions of the Covid-19 pandemic.   The patient has been notified of following:  \"This video visit will be conducted via a call between you and your provider. We have found that certain health care needs can be provided without the need for an in-person physical exam.  This service lets us provide the care you need with a video conversation. If during the course of the call the provider feels a video visit is not appropriate, you will not be charged for this service.\"   The provider has received verbal consent for a Video Visit from the patient or first contact? Yes  Patient or facility staff would like the video invitation sent by: N/A   Video Start Time: 11:39  [place] Medical Record Number:  [id number]  Place of Location at the time of visit: [facility]  Chief Complaint   Patient presents with     Nursing Home Regulatory   HPI:    Sherri Bender is a 86 year old  (6/6/1932), who is being seen today for a federally mandated E/M visit.  HPI information obtained from: facility chart records, facility staff, patient report and [facility] chart review.     Today's concerns are:  - Pt seen in the presence of RN who graciously assisted with the virtual visit  -  RN reports Resident continued to be enrolled in hospice, mood has been stable.     --------------------------------  - - Past Medical, social, family histories, medications, and allergies reviewed and updated  - Medications reviewed: in the chart and EHR.   - Case Management:   I have reviewed the care plan and MDS and do agree with the plan. Patient's desire to return to the community is not present.  Information reviewed:  Medications, vital signs, orders, and nursing notes.    MEDICATIONS:  Current " Outpatient Medications   Medication Sig Dispense Refill     Acetaminophen (TYLENOL PO) Take 1,000 mg by mouth 3 times daily       bisacodyl (DULCOLAX) 10 MG Suppository Place 10 mg rectally daily as needed for constipation       guaiFENesin (ROBITUSSIN) 100 MG/5ML SYRP Take 20 mLs by mouth every 4 hours as needed for cough       morphine sulfate 20 MG/5ML SOLN Take 0.25 mL by mouth every 1 hour as needed       polyethylene glycol (MIRALAX/GLYCOLAX) Packet Take 17 g by mouth daily       senna-docusate (SENOKOT-S;PERICOLACE) 8.6-50 MG per tablet Take 1 tablet by mouth daily        Sertraline HCl (ZOLOFT PO) Take 25 mg by mouth daily       TRAMADOL HCL PO Take 25 mg by mouth 2 times daily        TRAZODONE HCL PO Take 25 mg by mouth At Bedtime      ROS: Unobtainable secondary to cognitive impairment except as in HPI  Exam:  BP Readings from Last 3 Encounters:   06/07/20 (!) 137/98   04/07/20 129/84   01/30/20 (!) 148/84     Pulse Readings from Last 4 Encounters:   06/07/20 94   04/07/20 81   01/30/20 86   12/12/19 87   Limited visit exam done given COVID-19 precautions.   GENERAL APPEARANCE: no distress   RESP:  unlabored breathing  SKIN: thin.   NEURO:   no NFD appreciated on observation  PSYCH: pleasantly confused.      Lab/Diagnostic data: none new to review.     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  on sertraline 25 mg, stable.  - Continue to anticipate needs. Chronic condition, ongoing decline expected.   -  Continue to provide redirection and reassurance as needed. Maintain safe living situation with goals focused on comfort.      Systolic congestive heart failure, unspecified congestive heart failure chronicity (H)  HTN, essential  - compensated     Frail elderly:  Significant  Deficits requiring NH placement. Requiring extensive assistance  from nursing. Up for meals only o/w spends the day resting in bed      Hospice Care:  Appreciate collaboration with  hospice team for symptom management in collaboration with cares for maximum comfort at end-of-life    Orders: See above, otherwise, continue the rest of the current POC.     Electronically signed by:  Aquiles Hernandez MD    Video-Visit Details  Type of service:  Video Visit  Video End Time (time video stopped): 11:41  Distant Location (provider location):  [place] [facility]       Sincerely,        Aquiles Hernandez MD

## 2020-06-20 ASSESSMENT — MIFFLIN-ST. JEOR: SCORE: 1121.26

## 2020-08-05 VITALS
HEART RATE: 89 BPM | HEIGHT: 63 IN | RESPIRATION RATE: 17 BRPM | DIASTOLIC BLOOD PRESSURE: 55 MMHG | WEIGHT: 159.2 LBS | TEMPERATURE: 98.5 F | BODY MASS INDEX: 28.21 KG/M2 | OXYGEN SATURATION: 93 % | SYSTOLIC BLOOD PRESSURE: 130 MMHG

## 2020-08-05 NOTE — PROGRESS NOTES
[place] [facility]  Chief Complaint   Patient presents with     halfway Regulatory     [place] Medical Record Number: [id number]  Place of Service where encounter took place: [facility] ([facility]) [[id number]]    HPI:    Sherri Bender is 88 year old (6/6/1932), who is being seen today for a federally mandated E/M visit.     Seeing patient for a regulatory visit.   Staff report no mood or sleep concerns. Patient continues to have intermittent periods of lethargy and anxiety, no changes. No signs of pain. No fever, chills or breathing concerns. No reported bowel or bladder concerns. Appetite is at baseline.     ALLERGIES:Patient has no known allergies.  PAST MEDICAL HISTORY:   has a past medical history of Dementia (H), Depression, Environmental allergies, Family history of ischemic heart disease (2/9/2010), HTN (hypertension), and Osteoarthritis.  PAST SURGICAL HISTORY:   has a past surgical history that includes appendectomy; surgical history of - ; surgical history of - ; tonsillectomy; cataract iol, rt/lt (4/2010); and Colonoscopy (6/20/2011).  FAMILY HISTORY: family history includes Allergies in her brother; Cancer in her brother and mother; Heart Disease in her father and mother.  SOCIAL HISTORY:  reports that she has quit smoking. She has never used smokeless tobacco. She reports that she does not drink alcohol or use drugs.    MEDICATIONS:  Current Outpatient Medications   Medication Sig Dispense Refill     Acetaminophen (TYLENOL PO) Take 1,000 mg by mouth 3 times daily       bisacodyl (DULCOLAX) 10 MG Suppository Place 10 mg rectally daily as needed for constipation       guaiFENesin (ROBITUSSIN) 100 MG/5ML SYRP Take 20 mLs by mouth every 4 hours as needed for cough       morphine sulfate 20 MG/5ML SOLN Take 0.25 mL by mouth every 1 hour as needed       polyethylene glycol (MIRALAX/GLYCOLAX) Packet Take 17 g by mouth daily       senna-docusate (SENOKOT-S;PERICOLACE) 8.6-50  "MG per tablet Take 1 tablet by mouth daily        Sertraline HCl (ZOLOFT PO) Take 25 mg by mouth daily       TRAMADOL HCL PO Take 25 mg by mouth 2 times daily        TRAZODONE HCL PO Take 25 mg by mouth At Bedtime            Case Management:  I have reviewed the care plan and MDS and do agree with the plan. Patient's desire to return to the community is not assessible due to cognitive impairment. Information reviewed:  Medications, vital signs, orders, and nursing notes.    ROS:  Unobtainable secondary to cognitive impairment.     Vitals:  /55   Pulse 89   Temp 98.5  F (36.9  C)   Resp 17   Ht 1.6 m (5' 3\")   Wt 72.2 kg (159 lb 3.2 oz)   SpO2 93%   BMI 28.20 kg/m    Body mass index is 28.2 kg/m .  Exam:  GENERAL APPEARANCE:  Alert, in no distress  RESP:  respiratory effort and palpation of chest normal, auscultation of lungs clear , no respiratory distress  CV:  Palpation and auscultation of heart done , rate and rhythm reg, no murmur, no peripheral edema  ABDOMEN:  normal bowel sounds, soft, nontender, no hepatosplenomegaly or other masses  M/S:   Gait and station non ambulatory, Digits and nails no concerns  SKIN:  Inspection and Palpation of skin and subcutaneous tissue no rashes or lesions to exposed skin  NEURO: 2-12 in normal limits and at patient's baseline  PSYCH:  insight and judgement, memory impaired , affect flat      Lab/Diagnostic data:   not following routine labs, pt is on hospice    ASSESSMENT/PLAN    Alzheimer's disease of other onset without behavioral disturbance (H)  Paranoia (H)  Insomnia, unspecified type  Mild major depression (H)  [facility]osp[facility] care- [facility]  - needs assistance with all ADL's. Wt's stable. Not ambulatory. On hospice with a goal of comfort.   - zoloft GDR in March ago with no increase in signs of depression, continue current dose  - sleeping well, continue trazodone f 50 to 25. Melatonin was stopped 12/19.   - nsg or hospice to update with concerns.      Hypertensive " heart disease with chronic systolic congestive heart failure (H)  -Vitals reviewed, at baseline for pt.  Okay intermittent B/P ~ 150-160 with advanced age and goal of comfort.  On no cardiac medications     Primary osteoarthritis involving multiple joints  -Patient is on scheduled Toradol and Tylenol.  Has morphine available if pain.  No recent reports or signs of pain     Slow transit constipation  -No bowel concerns reported.  Continue bowel medications. staff to monitor bowels and administer PRN medications if necessary    transcribed by : Katelyn Zuniga    No new orders      Electronically signed by:  VALERIE Singh CNP

## 2020-08-06 ENCOUNTER — NURSING HOME VISIT (OUTPATIENT)
Dept: GERIATRICS | Facility: CLINIC | Age: 85
End: 2020-08-06
Payer: MEDICARE

## 2020-08-06 DIAGNOSIS — F22 PARANOIA (H): ICD-10-CM

## 2020-08-06 DIAGNOSIS — I11.0 HYPERTENSIVE HEART DISEASE WITH CHRONIC SYSTOLIC CONGESTIVE HEART FAILURE (H): ICD-10-CM

## 2020-08-06 DIAGNOSIS — F32.0 MILD MAJOR DEPRESSION (H): ICD-10-CM

## 2020-08-06 DIAGNOSIS — K59.01 SLOW TRANSIT CONSTIPATION: ICD-10-CM

## 2020-08-06 DIAGNOSIS — G30.8 ALZHEIMER'S DISEASE OF OTHER ONSET WITHOUT BEHAVIORAL DISTURBANCE: Primary | ICD-10-CM

## 2020-08-06 DIAGNOSIS — I50.22 HYPERTENSIVE HEART DISEASE WITH CHRONIC SYSTOLIC CONGESTIVE HEART FAILURE (H): ICD-10-CM

## 2020-08-06 DIAGNOSIS — Z51.5 HOSPICE CARE: ICD-10-CM

## 2020-08-06 DIAGNOSIS — F02.80 ALZHEIMER'S DISEASE OF OTHER ONSET WITHOUT BEHAVIORAL DISTURBANCE: Primary | ICD-10-CM

## 2020-08-06 PROCEDURE — 99309 SBSQ NF CARE MODERATE MDM 30: CPT | Mod: GW | Performed by: NURSE PRACTITIONER

## 2020-08-06 NOTE — LETTER
8/6/2020        RE: Sherri Cardona  [address] [facility] [address]        [place] [facility]  Chief Complaint   Patient presents with     shelter Regulatory     [facility] Medical Record Number: [id number]  Place of Service where encounter took place: [facility] ([facility]) [[id number]]    HPI:    Sherri Bender is 88 year old (6/6/1932), who is being seen today for a federally mandated E/M visit.     Seeing patient for a regulatory visit.   Staff report no mood or sleep concerns. Patient continues to have intermittent periods of lethargy and anxiety, no changes. No signs of pain. No fever, chills or breathing concerns. No reported bowel or bladder concerns. Appetite is at baseline.     ALLERGIES:Patient has no known allergies.  PAST MEDICAL HISTORY:   has a past medical history of Dementia (H), Depression, Environmental allergies, Family history of ischemic heart disease (2/9/2010), HTN (hypertension), and Osteoarthritis.  PAST SURGICAL HISTORY:   has a past surgical history that includes appendectomy; surgical history of - ; surgical history of - ; tonsillectomy; cataract iol, rt/lt (4/2010); and Colonoscopy (6/20/2011).  FAMILY HISTORY: family history includes Allergies in her brother; Cancer in her brother and mother; Heart Disease in her father and mother.  SOCIAL HISTORY:  reports that she has quit smoking. She has never used smokeless tobacco. She reports that she does not drink alcohol or use drugs.    MEDICATIONS:  Current Outpatient Medications   Medication Sig Dispense Refill     Acetaminophen (TYLENOL PO) Take 1,000 mg by mouth 3 times daily       bisacodyl (DULCOLAX) 10 MG Suppository Place 10 mg rectally daily as needed for constipation       guaiFENesin (ROBITUSSIN) 100 MG/5ML SYRP Take 20 mLs by mouth every 4 hours as needed for cough       morphine sulfate 20 MG/5ML SOLN Take 0.25 mL by mouth every 1 hour as needed        "polyethylene glycol (MIRALAX/GLYCOLAX) Packet Take 17 g by mouth daily       senna-docusate (SENOKOT-S;PERICOLACE) 8.6-50 MG per tablet Take 1 tablet by mouth daily        Sertraline HCl (ZOLOFT PO) Take 25 mg by mouth daily       TRAMADOL HCL PO Take 25 mg by mouth 2 times daily        TRAZODONE HCL PO Take 25 mg by mouth At Bedtime            Case Management:  I have reviewed the care plan and MDS and do agree with the plan. Patient's desire to return to the community is not assessible due to cognitive impairment. Information reviewed:  Medications, vital signs, orders, and nursing notes.    ROS:  Unobtainable secondary to cognitive impairment.     Vitals:  /55   Pulse 89   Temp 98.5  F (36.9  C)   Resp 17   Ht 1.6 m (5' 3\")   Wt 72.2 kg (159 lb 3.2 oz)   SpO2 93%   BMI 28.20 kg/m    Body mass index is 28.2 kg/m .  Exam:  GENERAL APPEARANCE:  Alert, in no distress  RESP:  respiratory effort and palpation of chest normal, auscultation of lungs clear , no respiratory distress  CV:  Palpation and auscultation of heart done , rate and rhythm reg, no murmur, no peripheral edema  ABDOMEN:  normal bowel sounds, soft, nontender, no hepatosplenomegaly or other masses  M/S:   Gait and station non ambulatory, Digits and nails no concerns  SKIN:  Inspection and Palpation of skin and subcutaneous tissue no rashes or lesions to exposed skin  NEURO: 2-12 in normal limits and at patient's baseline  PSYCH:  insight and judgement, memory impaired , affect flat      Lab/Diagnostic data:   not following routine labs, pt is on hospice    ASSESSMENT/PLAN    Alzheimer's disease of other onset without behavioral disturbance (H)  Paranoia (H)  Insomnia, unspecified type  Mild major depression (H)  Hospice care-  low  - needs assistance with all ADL's. Wt's stable. Not ambulatory. On hospice with a goal of comfort.   - zoloft GDR in March ago with no increase in signs of depression, continue current dose  - sleeping well, " continue trazodone f 50 to 25. Melatonin was stopped 12/19.   - nsg or hospice to update with concerns.      Hypertensive heart disease with chronic systolic congestive heart failure (H)  -Vitals reviewed, at baseline for pt.  Okay intermittent B/P ~ 150-160 with advanced age and goal of comfort.  On no cardiac medications     Primary osteoarthritis involving multiple joints  -Patient is on scheduled Toradol and Tylenol.  Has morphine available if pain.  No recent reports or signs of pain     Slow transit constipation  -No bowel concerns reported.  Continue bowel medications. staff to monitor bowels and administer PRN medications if necessary    transcribed by : Katelyn Zuniga    No new orders      Electronically signed by:  VALERIE Singh CNP          Sincerely,        VALERIE Singh CNP

## 2020-09-11 ENCOUNTER — HOSPITAL LABORATORY (OUTPATIENT)
Facility: OTHER | Age: 85
End: 2020-09-11

## 2020-09-12 LAB
SARS-COV-2 RNA SPEC QL NAA+PROBE: NOT DETECTED
SPECIMEN SOURCE: NORMAL

## 2020-09-17 ENCOUNTER — HOSPITAL LABORATORY (OUTPATIENT)
Facility: OTHER | Age: 85
End: 2020-09-17

## 2020-09-19 LAB
SARS-COV-2 RNA SPEC QL NAA+PROBE: NOT DETECTED
SPECIMEN SOURCE: NORMAL

## 2020-10-12 ENCOUNTER — VIRTUAL VISIT (OUTPATIENT)
Dept: GERIATRICS | Facility: CLINIC | Age: 85
End: 2020-10-12
Payer: COMMERCIAL

## 2020-10-12 VITALS
HEIGHT: 63 IN | SYSTOLIC BLOOD PRESSURE: 138 MMHG | WEIGHT: 160.2 LBS | OXYGEN SATURATION: 94 % | RESPIRATION RATE: 18 BRPM | BODY MASS INDEX: 28.39 KG/M2 | DIASTOLIC BLOOD PRESSURE: 81 MMHG | TEMPERATURE: 98.9 F | HEART RATE: 96 BPM

## 2020-10-12 DIAGNOSIS — F32.0 MILD MAJOR DEPRESSION (H): ICD-10-CM

## 2020-10-12 DIAGNOSIS — F02.80 ALZHEIMER'S DISEASE OF OTHER ONSET WITHOUT BEHAVIORAL DISTURBANCE: Primary | ICD-10-CM

## 2020-10-12 DIAGNOSIS — Z51.5 HOSPICE CARE: ICD-10-CM

## 2020-10-12 DIAGNOSIS — G30.8 ALZHEIMER'S DISEASE OF OTHER ONSET WITHOUT BEHAVIORAL DISTURBANCE: Primary | ICD-10-CM

## 2020-10-12 DIAGNOSIS — I10 ESSENTIAL HYPERTENSION: ICD-10-CM

## 2020-10-12 DIAGNOSIS — R54 FRAIL ELDERLY: ICD-10-CM

## 2020-10-12 DIAGNOSIS — F22 PARANOIA (H): ICD-10-CM

## 2020-10-12 DIAGNOSIS — I50.22 CHRONIC SYSTOLIC CONGESTIVE HEART FAILURE (H): ICD-10-CM

## 2020-10-12 PROCEDURE — 99309 SBSQ NF CARE MODERATE MDM 30: CPT | Mod: GT | Performed by: FAMILY MEDICINE

## 2020-10-12 ASSESSMENT — MIFFLIN-ST. JEOR: SCORE: 1125.79

## 2020-10-12 NOTE — LETTER
"    10/12/2020        RE: Sherri Gustafson  [address] [facility] [address]        duplicate      [place] [facility] Regulatory   Sherri Bender is being evaluated via a billable video visit due to the restrictions of the Covid-19 pandemic.   The patient has been notified of following:  \"This video visit will be conducted via a call between you and your provider. We have found that certain health care needs can be provided without the need for an in-person physical exam.  This service lets us provide the care you need with a video conversation. If during the course of the call the provider feels a video visit is not appropriate, you will not be charged for this service.\"   The provider has received verbal consent for a Video Visit from the patient or first contact? Yes  Patient or facility staff would like the video invitation sent by: N/A   Video Start Time: 12:12  [place] Medical Record Number:  [id number]  Place of Location at the time of visit: [facility]son [facility]  Chief Complaint   Patient presents with     longterm Regulatory     Video Visit   HPI:    Sherri Bender is a 86 year old  (6/6/1932), who is being seen today for a federally mandated E/M visit.  HPI information obtained from: facility chart records, facility staff, patient report and [facility] chart review.     Today's concerns are:  - Pt seen in the presence of RN who graciously assisted with the virtual visit  -  RN reports Resident continued to be enrolled in hospice, mood has been stable, no interaction, quiet, requires rosa maria mechanical transfer.   --------------------------------  - - Past Medical, social, family histories, medications, and allergies reviewed and updated  - Medications reviewed: in the chart and EHR.   - Case Management:   I have reviewed the care plan and MDS and do agree with the plan. Patient's desire to return to the community is not present.  Information " reviewed:  Medications, vital signs, orders, and nursing notes.    MEDICATIONS:  Current Outpatient Medications   Medication Sig Dispense Refill     Acetaminophen (TYLENOL PO) Take 1,000 mg by mouth 3 times daily       bisacodyl (DULCOLAX) 10 MG Suppository Place 10 mg rectally daily as needed for constipation       guaiFENesin (ROBITUSSIN) 100 MG/5ML SYRP Take 20 mLs by mouth every 4 hours as needed for cough       morphine sulfate 20 MG/5ML SOLN Take 0.25 mL by mouth every 1 hour as needed       polyethylene glycol (MIRALAX/GLYCOLAX) Packet Take 17 g by mouth daily       senna-docusate (SENOKOT-S;PERICOLACE) 8.6-50 MG per tablet Take 1 tablet by mouth daily        Sertraline HCl (ZOLOFT PO) Take 25 mg by mouth daily       TRAMADOL HCL PO Take 25 mg by mouth 2 times daily        TRAZODONE HCL PO Take 25 mg by mouth At Bedtime      ROS: Unobtainable secondary to cognitive impairment except as in HPI  Exam:  BP Readings from Last 3 Encounters:   10/12/20 138/81   06/20/20 130/55   06/07/20 (!) 137/98     Pulse Readings from Last 4 Encounters:   10/12/20 96   06/20/20 89   06/07/20 94   04/07/20 81   Limited visit exam done given COVID-19 precautions.   GENERAL APPEARANCE: no distress   RESP:  unlabored breathing  SKIN: thin.   NEURO:   no NFD appreciated on observation  PSYCH: pleasantly confused.      Lab/Diagnostic data: none new to review.     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  on sertraline 25 mg, stable.  - Continue to anticipate needs. Chronic condition, ongoing decline expected.   -  Continue to provide redirection and reassurance as needed. Maintain safe living situation with goals focused on comfort.      Systolic congestive heart failure, unspecified congestive heart failure chronicity (H)  HTN, essential  - compensated     Frail  elderly:  Significant  Deficits requiring NH placement. Requiring extensive assistance from nursing. Up for meals only o/w spends the day resting in bed      Hospice Care:  Appreciate collaboration with  hospice team for symptom management in collaboration with cares for maximum comfort at end-of-life    Orders: See above, otherwise, continue the rest of the current POC.     Electronically signed by:  Aquiles Hernandez MD    Video-Visit Details  Type of service:  Video Visit  Video End Time (time video stopped): 12:13  Distant Location (provider location):  [place] [facility]         Sincerely,        Aquiles Hernandez MD

## 2020-10-20 NOTE — PROGRESS NOTES
"[place] [facility] Regulatory   Sherri Bender is being evaluated via a billable video visit due to the restrictions of the Covid-19 pandemic.   The patient has been notified of following:  \"This video visit will be conducted via a call between you and your provider. We have found that certain health care needs can be provided without the need for an in-person physical exam.  This service lets us provide the care you need with a video conversation. If during the course of the call the provider feels a video visit is not appropriate, you will not be charged for this service.\"   The provider has received verbal consent for a Video Visit from the patient or first contact? Yes  Patient or facility staff would like the video invitation sent by: N/A   Video Start Time: 12:12  [facility] Medical Record Number:  [id number]  Place of Location at the time of visit: [facility]  Chief Complaint   Patient presents with     retirement Regulatory     Video Visit   HPI:    Sherri Bender is a 86 year old  (6/6/1932), who is being seen today for a federally mandated E/M visit.  HPI information obtained from: facility chart records, facility staff, patient report and [facility] chart review.     Today's concerns are:  - Pt seen in the presence of RN who graciously assisted with the virtual visit  -  RN reports Resident continued to be enrolled in hospice, mood has been stable, no interaction, quiet, requires rosa maria mechanical transfer.   --------------------------------  - - Past Medical, social, family histories, medications, and allergies reviewed and updated  - Medications reviewed: in the chart and EHR.   - Case Management:   I have reviewed the care plan and MDS and do agree with the plan. Patient's desire to return to the community is not present.  Information reviewed:  Medications, vital signs, orders, and nursing notes.    MEDICATIONS:  Current Outpatient Medications   Medication Sig " Dispense Refill     Acetaminophen (TYLENOL PO) Take 1,000 mg by mouth 3 times daily       bisacodyl (DULCOLAX) 10 MG Suppository Place 10 mg rectally daily as needed for constipation       guaiFENesin (ROBITUSSIN) 100 MG/5ML SYRP Take 20 mLs by mouth every 4 hours as needed for cough       morphine sulfate 20 MG/5ML SOLN Take 0.25 mL by mouth every 1 hour as needed       polyethylene glycol (MIRALAX/GLYCOLAX) Packet Take 17 g by mouth daily       senna-docusate (SENOKOT-S;PERICOLACE) 8.6-50 MG per tablet Take 1 tablet by mouth daily        Sertraline HCl (ZOLOFT PO) Take 25 mg by mouth daily       TRAMADOL HCL PO Take 25 mg by mouth 2 times daily        TRAZODONE HCL PO Take 25 mg by mouth At Bedtime      ROS: Unobtainable secondary to cognitive impairment except as in HPI  Exam:  BP Readings from Last 3 Encounters:   10/12/20 138/81   06/20/20 130/55   06/07/20 (!) 137/98     Pulse Readings from Last 4 Encounters:   10/12/20 96   06/20/20 89   06/07/20 94   04/07/20 81   Limited visit exam done given COVID-19 precautions.   GENERAL APPEARANCE: no distress   RESP:  unlabored breathing  SKIN: thin.   NEURO:   no NFD appreciated on observation  PSYCH: pleasantly confused.      Lab/Diagnostic data: none new to review.     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  on sertraline 25 mg, stable.  - Continue to anticipate needs. Chronic condition, ongoing decline expected.   -  Continue to provide redirection and reassurance as needed. Maintain safe living situation with goals focused on comfort.      Systolic congestive heart failure, unspecified congestive heart failure chronicity (H)  HTN, essential  - compensated     Frail elderly:  Significant  Deficits requiring NH placement. Requiring extensive assistance from nursing. Up for meals only o/w spends  the day resting in bed      Hospice Care:  Appreciate collaboration with  hospice team for symptom management in collaboration with cares for maximum comfort at end-of-life    Orders: See above, otherwise, continue the rest of the current POC.     Electronically signed by:  Aquiles Hernandez MD    Video-Visit Details  Type of service:  Video Visit  Video End Time (time video stopped): 12:13  Distant Location (provider location):  [facility]

## 2020-11-16 ENCOUNTER — HOSPITAL LABORATORY (OUTPATIENT)
Facility: OTHER | Age: 85
End: 2020-11-16

## 2020-11-17 LAB
SARS-COV-2 RNA SPEC QL NAA+PROBE: NOT DETECTED
SPECIMEN SOURCE: NORMAL

## 2020-12-07 NOTE — PROGRESS NOTES
"[place] [facility] Reg[facility]   Sherri Bender is being evaluated via a billable video visit due to the restrictions of the Covid-19 pandemic.   The patient has been notified of following:  \"This video visit will be conducted via a call between you and your provider. We have found that certain health care needs can be provided without the need for an in-person physical exam.  This service lets us provide the care you need with a video conversation. If during the course of the call the provider feels a video visit is not appropriate, you will not be charged for this service.\"   The provider has received verbal consent for a Video Visit from the patient or first contact? Yes  Patient or facility staff would like the video invitation sent by: N/A   Video Start Time: 13:33  [facility] Medical Record Number:  [id number]  Place of Location at the time of visit: [facility]  Chief Complaint   Patient presents with     [facility] Regulatory     Video Visit   HPI:    Sherri Bender is a 86 year old  (6/6/1932), who is being seen today for a federally mandated E/M visit.  HPI information obtained from: facility chart records, facility staff, patient report and [facility] Epic chart review.     Today's concerns are:  - Pt seen in the presence of RN who graciously assisted with the virtual visit  - Hospice care: RN reports Resident requried rosa maria lift, floor aster.   - Psych: RN repots pt is very confused, constantly trying to move, not strong enough to get out of the bed by self.   --------------------------------  - - Past Medical, social, family histories, medications, and allergies reviewed and updated  - Medications reviewed: in the chart and EHR.   - Case Management:   I have reviewed the care plan and MDS and do agree with the plan. Patient's desire to return to the community is not present.  Information reviewed:  Medications, vital signs, orders, and nursing " notes.    MEDICATIONS:  Current Outpatient Medications   Medication Sig Dispense Refill     Acetaminophen (TYLENOL PO) Take 1,000 mg by mouth 3 times daily       bisacodyl (DULCOLAX) 10 MG Suppository Place 10 mg rectally daily as needed for constipation       guaiFENesin (ROBITUSSIN) 100 MG/5ML SYRP Take 20 mLs by mouth every 4 hours as needed for cough       morphine sulfate 20 MG/5ML SOLN Take 0.25 mL by mouth every 1 hour as needed       polyethylene glycol (MIRALAX/GLYCOLAX) Packet Take 17 g by mouth daily       senna-docusate (SENOKOT-S;PERICOLACE) 8.6-50 MG per tablet Take 1 tablet by mouth daily        Sertraline HCl (ZOLOFT PO) Take 25 mg by mouth daily       TRAMADOL HCL PO Take 25 mg by mouth 2 times daily        TRAZODONE HCL PO Take 25 mg by mouth At Bedtime      ROS: Unobtainable secondary to cognitive impairment except as in HPI  Exam:  BP Readings from Last 3 Encounters:   12/08/20 127/78   10/12/20 138/81   06/20/20 130/55     Pulse Readings from Last 4 Encounters:   12/08/20 96   10/12/20 96   06/20/20 89   06/07/20 94   Limited visit exam done given COVID-19 precautions.   GENERAL APPEARANCE: no distress   RESP:  unlabored breathing  SKIN: thin.   NEURO:   no NFD appreciated on observation  PSYCH: pleasantly confused.      Lab/Diagnostic data: none new to review.     ASSESSMENT/PLAN  ---------------------------------  Systolic congestive heart failure, unspecified congestive heart failure chronicity (H)  HTN, essential  - compensated     Frail elderly:  Significant  Deficits requiring NH placement. Requiring extensive assistance from nursing. 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  on sertraline 25 mg, stable.  - Continue to anticipate needs. Chronic condition, ongoing decline expected.   -  Continue  to provide redirection and reassurance as needed. Maintain safe living situation with goals focused on comfort.   -  Appreciate collaboration with  hospice team for symptom management in collaboration with cares for maximum comfort at end-of-life    Orders: See above, otherwise, continue the rest of the current POC.     Electronically signed by:  Aquiles Hernandez MD    Video-Visit Details  Type of service:  Video Visit  Video End Time (time video stopped): 13:34  Distant Location (provider location):  [facility]

## 2020-12-08 ENCOUNTER — VIRTUAL VISIT (OUTPATIENT)
Dept: GERIATRICS | Facility: CLINIC | Age: 85
End: 2020-12-08
Payer: COMMERCIAL

## 2020-12-08 VITALS
DIASTOLIC BLOOD PRESSURE: 78 MMHG | WEIGHT: 149 LBS | BODY MASS INDEX: 26.4 KG/M2 | SYSTOLIC BLOOD PRESSURE: 127 MMHG | RESPIRATION RATE: 18 BRPM | HEIGHT: 63 IN | OXYGEN SATURATION: 94 % | HEART RATE: 96 BPM | TEMPERATURE: 98.5 F

## 2020-12-08 DIAGNOSIS — F22 PARANOIA (H): ICD-10-CM

## 2020-12-08 DIAGNOSIS — G30.8 ALZHEIMER'S DISEASE OF OTHER ONSET WITHOUT BEHAVIORAL DISTURBANCE: ICD-10-CM

## 2020-12-08 DIAGNOSIS — I10 ESSENTIAL HYPERTENSION: ICD-10-CM

## 2020-12-08 DIAGNOSIS — F02.80 ALZHEIMER'S DISEASE OF OTHER ONSET WITHOUT BEHAVIORAL DISTURBANCE: ICD-10-CM

## 2020-12-08 DIAGNOSIS — F32.0 MILD MAJOR DEPRESSION (H): ICD-10-CM

## 2020-12-08 DIAGNOSIS — I50.22 CHRONIC SYSTOLIC CONGESTIVE HEART FAILURE (H): Primary | ICD-10-CM

## 2020-12-08 DIAGNOSIS — Z51.5 HOSPICE CARE: ICD-10-CM

## 2020-12-08 DIAGNOSIS — R54 FRAIL ELDERLY: ICD-10-CM

## 2020-12-08 PROCEDURE — 99309 SBSQ NF CARE MODERATE MDM 30: CPT | Mod: GT | Performed by: FAMILY MEDICINE

## 2020-12-08 PROCEDURE — 99207 PR CDG-CUT & PASTE-POTENTIAL IMPACT ON LEVEL: CPT | Performed by: FAMILY MEDICINE

## 2020-12-08 ASSESSMENT — MIFFLIN-ST. JEOR: SCORE: 1074.99

## 2020-12-08 NOTE — LETTER
"    12/8/2020        RE: Sherri Gustafson  [address] [address] [address]        [place] [facility] Reg[facility]   Sherri Bender is being evaluated via a billable video visit due to the restrictions of the Covid-19 pandemic.   The patient has been notified of following:  \"This video visit will be conducted via a call between you and your provider. We have found that certain health care needs can be provided without the need for an in-person physical exam.  This service lets us provide the care you need with a video conversation. If during the course of the call the provider feels a video visit is not appropriate, you will not be charged for this service.\"   The provider has received verbal consent for a Video Visit from the patient or first contact? Yes  Patient or facility staff would like the video invitation sent by: N/A   Video Start Time: 13:33  [facility] Medical Record Number:  [id number]  Place of Location at the time of visit: [facility]  Chief Complaint   Patient presents with     assisted Regulatory     Video Visit   HPI:    Sherri Bender is a 86 year old  (6/6/1932), who is being seen today for a federally mandated E/M visit.  HPI information obtained from: facility chart records, facility staff, patient report and [facility] chart review.     Today's concerns are:  - Pt seen in the presence of RN who graciously assisted with the virtual visit  - Hospice care: RN reports Resident requried rosa maria lift, floor aster.   - Psych: RN repots pt is very confused, constantly trying to move, not strong enough to get out of the bed by self.   --------------------------------  - - Past Medical, social, family histories, medications, and allergies reviewed and updated  - Medications reviewed: in the chart and EHR.   - Case Management:   I have reviewed the care plan and MDS and do agree with the plan. Patient's desire to return to the community " is not present.  Information reviewed:  Medications, vital signs, orders, and nursing notes.    MEDICATIONS:  Current Outpatient Medications   Medication Sig Dispense Refill     Acetaminophen (TYLENOL PO) Take 1,000 mg by mouth 3 times daily       bisacodyl (DULCOLAX) 10 MG Suppository Place 10 mg rectally daily as needed for constipation       guaiFENesin (ROBITUSSIN) 100 MG/5ML SYRP Take 20 mLs by mouth every 4 hours as needed for cough       morphine sulfate 20 MG/5ML SOLN Take 0.25 mL by mouth every 1 hour as needed       polyethylene glycol (MIRALAX/GLYCOLAX) Packet Take 17 g by mouth daily       senna-docusate (SENOKOT-S;PERICOLACE) 8.6-50 MG per tablet Take 1 tablet by mouth daily        Sertraline HCl (ZOLOFT PO) Take 25 mg by mouth daily       TRAMADOL HCL PO Take 25 mg by mouth 2 times daily        TRAZODONE HCL PO Take 25 mg by mouth At Bedtime      ROS: Unobtainable secondary to cognitive impairment except as in HPI  Exam:  BP Readings from Last 3 Encounters:   12/08/20 127/78   10/12/20 138/81   06/20/20 130/55     Pulse Readings from Last 4 Encounters:   12/08/20 96   10/12/20 96   06/20/20 89   06/07/20 94   Limited visit exam done given COVID-19 precautions.   GENERAL APPEARANCE: no distress   RESP:  unlabored breathing  SKIN: thin.   NEURO:   no NFD appreciated on observation  PSYCH: pleasantly confused.      Lab/Diagnostic data: none new to review.     ASSESSMENT/PLAN  ---------------------------------  Systolic congestive heart failure, unspecified congestive heart failure chronicity (H)  HTN, essential  - compensated     Frail elderly:  Significant  Deficits requiring NH placement. Requiring extensive assistance from nursing. 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  on sertraline 25 mg, stable.  -  Continue to anticipate needs. Chronic condition, ongoing decline expected.   -  Continue to provide redirection and reassurance as needed. Maintain safe living situation with goals focused on comfort.   -  Appreciate collaboration with  hospice team for symptom management in collaboration with cares for maximum comfort at end-of-life    Orders: See above, otherwise, continue the rest of the current POC.     Electronically signed by:  Aquiles Hernandez MD    Video-Visit Details  Type of service:  Video Visit  Video End Time (time video stopped): 13:34  Distant Location (provider location):  [place] [facility]         Sincerely,        Aquiles Hernandez MD

## 2020-12-31 ENCOUNTER — HOSPITAL LABORATORY (OUTPATIENT)
Facility: OTHER | Age: 85
End: 2020-12-31

## 2021-01-01 LAB
SARS-COV-2 RNA SPEC QL NAA+PROBE: ABNORMAL
SPECIMEN SOURCE: ABNORMAL

## 2021-01-04 ENCOUNTER — NURSING HOME VISIT (OUTPATIENT)
Dept: GERIATRICS | Facility: CLINIC | Age: 86
End: 2021-01-04
Payer: COMMERCIAL

## 2021-01-04 VITALS
TEMPERATURE: 99.1 F | SYSTOLIC BLOOD PRESSURE: 122 MMHG | WEIGHT: 151.1 LBS | BODY MASS INDEX: 26.77 KG/M2 | DIASTOLIC BLOOD PRESSURE: 80 MMHG | OXYGEN SATURATION: 90 % | RESPIRATION RATE: 18 BRPM | HEART RATE: 71 BPM | HEIGHT: 63 IN

## 2021-01-04 DIAGNOSIS — F02.818 ALZHEIMER'S DISEASE OF OTHER ONSET WITH BEHAVIORAL DISTURBANCE: ICD-10-CM

## 2021-01-04 DIAGNOSIS — I10 ESSENTIAL HYPERTENSION: ICD-10-CM

## 2021-01-04 DIAGNOSIS — G30.8 ALZHEIMER'S DISEASE OF OTHER ONSET WITH BEHAVIORAL DISTURBANCE: ICD-10-CM

## 2021-01-04 DIAGNOSIS — I50.22 CHRONIC SYSTOLIC CONGESTIVE HEART FAILURE (H): ICD-10-CM

## 2021-01-04 DIAGNOSIS — U07.1 CLINICAL DIAGNOSIS OF COVID-19: Primary | ICD-10-CM

## 2021-01-04 DIAGNOSIS — F41.9 ANXIETY: ICD-10-CM

## 2021-01-04 DIAGNOSIS — F32.0 MILD MAJOR DEPRESSION (H): ICD-10-CM

## 2021-01-04 DIAGNOSIS — Z51.5 HOSPICE CARE: ICD-10-CM

## 2021-01-04 PROCEDURE — 99309 SBSQ NF CARE MODERATE MDM 30: CPT | Mod: GV | Performed by: NURSE PRACTITIONER

## 2021-01-04 ASSESSMENT — MIFFLIN-ST. JEOR: SCORE: 1084.52

## 2021-01-04 NOTE — LETTER
1/4/2021        RE: Sherri Bender  Replaced by [facility]  [address] [address] [address]        [place] [facility]  Chief Complaint   Patient presents with     prison Regulatory     [facility] Medical Record Number:  [id number]  Place of Service where encounter took place:  [facility] ([facility]) [[id number]]    HPI:    Sherri Bender  is 88 year old (6/6/1932), who is being seen today for a federally mandated E/M visit.  HPI information obtained from: facility chart records, facility staff, patient report and [facility] chart review.     Brief History:  Sherri has been a resident at [facility] since 1/2018.  Her past medical history is significant for dementia with paranoia, depression, CHF, hypertension.  She has been enrolled with [facility] since 10/2018.  She is unable to answer simple questions, therefore assessment of orientation is not possible.  Her condition has remained relatively stable over last months, gradual decline in ability to care for self has been noted.     Today's concerns are:     Clinical diagnosis of COVID-19  Alzheimer's disease of other onset with behavioral disturbance (H)  Chronic systolic congestive heart failure (H)  Essential hypertension  Mild major depression (H)  Anxiety  Hospice care       Tested positive from ANTIGEN test at [facility] on 12/31/20. Result records sent to [facility] for scanning if appropriate.    Currently experiencing minimal symptoms.     Code status is confirmed DNR/DNI  and is updated at the facility and [facility]. Goals of care are comfort focused and pt/family would not want hospitalization if symptoms became unstable for SNF. Has been enrolled in hospice    Staff will certainly ensure access to whatever food is appetizing and will encourage fluid intake, additional supplements prn.     PRN Tylenol, zofran, O2 available/ordered.     PRN comfort/end of life care medications with lorazepam,  morphine are present.       Sherri is unable to answer simple questions, is lying in bed resting.  Nursing reports no fever, congested cough at times.       ALLERGIES:Patient has no known allergies.  PAST MEDICAL HISTORY:   has a past medical history of Dementia (H), Depression, Environmental allergies, Family history of ischemic heart disease (2/9/2010), HTN (hypertension), and Osteoarthritis.  PAST SURGICAL HISTORY:   has a past surgical history that includes appendectomy; surgical history of - ; surgical history of - ; tonsillectomy; cataract iol, rt/lt (4/2010); and Colonoscopy (6/20/2011).  FAMILY HISTORY: family history includes Allergies in her brother; Cancer in her brother and mother; Heart Disease in her father and mother.  SOCIAL HISTORY:  reports that she has quit smoking. She has never used smokeless tobacco. She reports that she does not drink alcohol or use drugs.    MEDICATIONS:  Current Outpatient Medications   Medication Sig Dispense Refill     Acetaminophen (TYLENOL PO) Take 1,000 mg by mouth 3 times daily       bisacodyl (DULCOLAX) 10 MG Suppository Place 10 mg rectally daily as needed for constipation       guaiFENesin (ROBITUSSIN) 100 MG/5ML SYRP Take 20 mLs by mouth every 4 hours as needed for cough       LORazepam (ATIVAN) 2 MG/ML (HIGH CONC) solution Take 0.5 mg by mouth At Bedtime       morphine sulfate 20 MG/5ML SOLN Take 0.25 mL by mouth every 1 hour as needed       polyethylene glycol (MIRALAX/GLYCOLAX) Packet Take 17 g by mouth daily       senna-docusate (SENOKOT-S;PERICOLACE) 8.6-50 MG per tablet Take 1 tablet by mouth 2 times daily       Sertraline HCl (ZOLOFT PO) Take 25 mg by mouth daily       TRAMADOL HCL PO Take 25 mg by mouth 2 times daily        TRAZODONE HCL PO Take 50 mg by mouth At Bedtime         Case Management:  I have reviewed the care plan and MDS and do agree with the plan. Patient's desire to return to the community is not assessible due to cognitive impairment.  "Information reviewed:  Medications, vital signs, orders, and nursing notes.    ROS:  Unobtainable secondary to cognitive impairment.     Vitals:  /80   Pulse 71   Temp 99.1  F (37.3  C)   Resp 18   Ht 1.6 m (5' 3\")   Wt 68.5 kg (151 lb 1.6 oz)   SpO2 90%   BMI 26.77 kg/m    Body mass index is 26.77 kg/m .  Exam:  GENERAL APPEARANCE:  Sleepy, in no distress   HEAD:  Normal, normocephalic, atraumatic  ENT:  Mouth and posterior oropharynx normal, moist mucous membranes, hearing acuity - hard of hearing (wears hearing aid)  EYE EXAM:  EOM, conjunctivae, lids, pupils and irises normal   CHEST/RESP:  respiratory effort normal, no respiratory distress, lung sounds CTA    CV:  Rate and rhythm reg, no murmur/rub/gallop, no peripheral edema  M/S:   extremities normal, gait & station abnormal-does not ambulate, digits and nails within normal limits  PSYCH:  Sleepy and unable to answer simple questions,     Lab/Diagnostic data:   no recent labs, on hospice    ASSESSMENT/PLAN  Clinical diagnosis of COVID-19  Currently is stable, but high risk for worsening symptoms given disease process in frail elderly. On hospice and has comfort medications available if symptoms worsen    Started droplet isolation precautions    Zofran 4 mg po tid prn nausea    Tylenol 650 mg po or rectal QID PRN for fever or pain    Extra strength tylenol 1000 mg po QID PRN for fever or pain    Do not exceed 4000 mg tylenol from all sources per 24 hours    O2 at 2-4 lpm per NC to keep sats >89% diagnosis hypoxia or for comfort    Nutritional supplement BID PRN PO for weight loss or loss of appetite      Alzheimer's disease of other onset with behavioral disturbance (H)  Patient with advanced dementia, on hospice, who is unable to make her needs known.  Maintain safe living situation with goals focused on comfort    Chronic systolic congestive heart failure (H)  Essential hypertension  On no medications, BP has been stable, no symptoms of CHF " exacerbation. Stable     Mild major depression (H)  Anxiety  On sertraline without new symptoms of depression or anxiety.  Appears comfortable in bed.     [facility]    Orders written by provider at facility    covid orders and monitoring begun    Electronically signed by:  VALERIE Shultz CNP                Sincerely,        VALERIE Shultz CNP

## 2021-01-04 NOTE — PROGRESS NOTES
[facility] [facility]  Chief Complaint   Patient presents with     [facility] Regulatory     [facility] Medical Record Number:  [id number]  Place of Service where encounter took place:  [facility] ([facility]) [[id number]]    HPI:    Sherri Bender  is 88 year old (6/6/1932), who is being seen today for a federally mandated E/M visit.  HPI information obtained from: facility chart records, facility staff, patient report and [facility] chart review.     Brief History:  Sherri has been a resident at [facility] since 1/2018.  Her past medical history is significant for dementia with paranoia, depression, CHF, hypertension.  She has been enrolled with [facility] since 10/2018.  She is unable to answer simple questions, therefore assessment of orientation is not possible.  Her condition has remained relatively stable over last months, gradual decline in ability to care for self has been noted.     Today's concerns are:     Clinical diagnosis of COVID-19  Alzheimer's disease of other onset with behavioral disturbance (H)  Chronic systolic congestive heart failure (H)  Essential hypertension  Mild major depression (H)  Anxiety  Hospice care       Tested positive from ANTIGEN test at [facility] on 12/31/20. Result records sent to [facility] for scanning if appropriate.    Currently experiencing minimal symptoms.     Code status is confirmed DNR/DNI  and is updated at the facility and [facility]. Goals of care are comfort focused and pt/family would not want hospitalization if symptoms became unstable for SNF. Has been enrolled in hospice    Staff will certainly ensure access to whatever food is appetizing and will encourage fluid intake, additional supplements prn.     PRN Tylenol, zofran, O2 available/ordered.     PRN comfort/end of life care medications with lorazepam, morphine are present.       Sherri is unable to answer simple questions, is lying in bed resting.  Nursing reports no  fever, congested cough at times.       ALLERGIES:Patient has no known allergies.  PAST MEDICAL HISTORY:   has a past medical history of Dementia (H), Depression, Environmental allergies, Family history of ischemic heart disease (2/9/2010), HTN (hypertension), and Osteoarthritis.  PAST SURGICAL HISTORY:   has a past surgical history that includes appendectomy; surgical history of - ; surgical history of - ; tonsillectomy; cataract iol, rt/lt (4/2010); and Colonoscopy (6/20/2011).  FAMILY HISTORY: family history includes Allergies in her brother; Cancer in her brother and mother; Heart Disease in her father and mother.  SOCIAL HISTORY:  reports that she has quit smoking. She has never used smokeless tobacco. She reports that she does not drink alcohol or use drugs.    MEDICATIONS:  Current Outpatient Medications   Medication Sig Dispense Refill     Acetaminophen (TYLENOL PO) Take 1,000 mg by mouth 3 times daily       bisacodyl (DULCOLAX) 10 MG Suppository Place 10 mg rectally daily as needed for constipation       guaiFENesin (ROBITUSSIN) 100 MG/5ML SYRP Take 20 mLs by mouth every 4 hours as needed for cough       LORazepam (ATIVAN) 2 MG/ML (HIGH CONC) solution Take 0.5 mg by mouth At Bedtime       morphine sulfate 20 MG/5ML SOLN Take 0.25 mL by mouth every 1 hour as needed       polyethylene glycol (MIRALAX/GLYCOLAX) Packet Take 17 g by mouth daily       senna-docusate (SENOKOT-S;PERICOLACE) 8.6-50 MG per tablet Take 1 tablet by mouth 2 times daily       Sertraline HCl (ZOLOFT PO) Take 25 mg by mouth daily       TRAMADOL HCL PO Take 25 mg by mouth 2 times daily        TRAZODONE HCL PO Take 50 mg by mouth At Bedtime         Case Management:  I have reviewed the care plan and MDS and do agree with the plan. Patient's desire to return to the community is not assessible due to cognitive impairment. Information reviewed:  Medications, vital signs, orders, and nursing notes.    ROS:  Unobtainable secondary to cognitive  "impairment.     Vitals:  /80   Pulse 71   Temp 99.1  F (37.3  C)   Resp 18   Ht 1.6 m (5' 3\")   Wt 68.5 kg (151 lb 1.6 oz)   SpO2 90%   BMI 26.77 kg/m    Body mass index is 26.77 kg/m .  Exam:  GENERAL APPEARANCE:  Sleepy, in no distress   HEAD:  Normal, normocephalic, atraumatic  ENT:  Mouth and posterior oropharynx normal, moist mucous membranes, hearing acuity - hard of hearing (wears hearing aid)  EYE EXAM:  EOM, conjunctivae, lids, pupils and irises normal   CHEST/RESP:  respiratory effort normal, no respiratory distress, lung sounds CTA    CV:  Rate and rhythm reg, no murmur/rub/gallop, no peripheral edema  M/S:   extremities normal, gait & station abnormal-does not ambulate, digits and nails within normal limits  PSYCH:  Sleepy and unable to answer simple questions,     Lab/Diagnostic data:   no recent labs, on hospice    ASSESSMENT/PLAN  Clinical diagnosis of COVID-19  Currently is stable, but high risk for worsening symptoms given disease process in frail elderly. On hospice and has comfort medications available if symptoms worsen    Started droplet isolation precautions    Zofran 4 mg po tid prn nausea    Tylenol 650 mg po or rectal QID PRN for fever or pain    Extra strength tylenol 1000 mg po QID PRN for fever or pain    Do not exceed 4000 mg tylenol from all sources per 24 hours    O2 at 2-4 lpm per NC to keep sats >89% diagnosis hypoxia or for comfort    Nutritional supplement BID PRN PO for weight loss or loss of appetite      Alzheimer's disease of other onset with behavioral disturbance (H)  Patient with advanced dementia, on hospice, who is unable to make her needs known.  Maintain safe living situation with goals focused on comfort    Chronic systolic congestive heart failure (H)  Essential hypertension  On no medications, BP has been stable, no symptoms of CHF exacerbation. Stable     Mild major depression (H)  Anxiety  On sertraline without new symptoms of depression or anxiety.  " Appears comfortable in bed.     [facility]osp[facility]- [facility]    Orders written by provider at facility    covid orders and monitoring begun    Electronically signed by:  VALERIE Shultz CNP

## 2021-01-08 PROBLEM — G30.8 ALZHEIMER'S DISEASE OF OTHER ONSET WITH BEHAVIORAL DISTURBANCE: Status: RESOLVED | Noted: 2021-01-08 | Resolved: 2021-01-08

## 2021-01-08 PROBLEM — F02.80 LATE ONSET ALZHEIMER'S DISEASE WITHOUT BEHAVIORAL DISTURBANCE (H): Chronic | Status: ACTIVE | Noted: 2018-01-14

## 2021-01-08 PROBLEM — Z51.5 HOSPICE CARE: Chronic | Status: ACTIVE | Noted: 2018-12-04

## 2021-01-08 PROBLEM — F02.818 ALZHEIMER'S DISEASE OF OTHER ONSET WITH BEHAVIORAL DISTURBANCE: Status: RESOLVED | Noted: 2021-01-08 | Resolved: 2021-01-08

## 2021-01-08 PROBLEM — G30.1 LATE ONSET ALZHEIMER'S DISEASE WITHOUT BEHAVIORAL DISTURBANCE (H): Chronic | Status: ACTIVE | Noted: 2018-01-14

## 2021-01-08 PROBLEM — G30.8 ALZHEIMER'S DISEASE OF OTHER ONSET WITH BEHAVIORAL DISTURBANCE: Status: ACTIVE | Noted: 2021-01-08

## 2021-01-08 PROBLEM — F02.818 ALZHEIMER'S DISEASE OF OTHER ONSET WITH BEHAVIORAL DISTURBANCE: Status: ACTIVE | Noted: 2021-01-08

## 2021-01-08 RX ORDER — LORAZEPAM 2 MG/ML
0.5 CONCENTRATE ORAL AT BEDTIME
COMMUNITY
End: 2021-03-16

## 2021-01-12 ENCOUNTER — NURSING HOME VISIT (OUTPATIENT)
Dept: GERIATRICS | Facility: CLINIC | Age: 86
End: 2021-01-12
Payer: COMMERCIAL

## 2021-01-12 VITALS
HEART RATE: 84 BPM | RESPIRATION RATE: 16 BRPM | OXYGEN SATURATION: 95 % | WEIGHT: 143.7 LBS | HEIGHT: 63 IN | DIASTOLIC BLOOD PRESSURE: 67 MMHG | TEMPERATURE: 98.7 F | BODY MASS INDEX: 25.46 KG/M2 | SYSTOLIC BLOOD PRESSURE: 105 MMHG

## 2021-01-12 DIAGNOSIS — Z51.5 HOSPICE CARE: ICD-10-CM

## 2021-01-12 DIAGNOSIS — F02.818 ALZHEIMER'S DISEASE OF OTHER ONSET WITH BEHAVIORAL DISTURBANCE: ICD-10-CM

## 2021-01-12 DIAGNOSIS — G30.8 ALZHEIMER'S DISEASE OF OTHER ONSET WITH BEHAVIORAL DISTURBANCE: ICD-10-CM

## 2021-01-12 DIAGNOSIS — U07.1 CLINICAL DIAGNOSIS OF COVID-19: Primary | ICD-10-CM

## 2021-01-12 DIAGNOSIS — I50.22 CHRONIC SYSTOLIC CONGESTIVE HEART FAILURE (H): ICD-10-CM

## 2021-01-12 PROCEDURE — 99309 SBSQ NF CARE MODERATE MDM 30: CPT | Mod: GV | Performed by: NURSE PRACTITIONER

## 2021-01-12 ASSESSMENT — MIFFLIN-ST. JEOR: SCORE: 1050.95

## 2021-01-12 NOTE — LETTER
"    1/12/2021        RE: Sherri Bender  [facility]  [address] [address] [address]        [place] [facility]    Chief Complaint   Patient presents with     Nursing Home Acute     HPI:    Sherri Bender is a 88 year old  (6/6/1932), who is being seen today for an episodic care visit at: [facility] [place] ([facility]) [[id number]]  Today's concern is: f/u in condition due to COVID   Please see 1/4 NP note for romo summary of events. Sherri is on hospice 2/2 dementia and tested + for COVID on 12/31.  She is fairly averbal - noted sbp range 90 - 150s, HR stable except today was 100's. Resting comfortable in [place] w/c - awakes with light touch and voice and can focus her eyes on me, but can't answer questions. Nsg noted wet cough in last 24 hrs.     PMH/PSH reviewed in EPIC today.  REVIEW OF SYSTEMS:  Unobtainable secondary to cognitive impairment.     EXAM:  /67   Pulse 84   Temp 98.7  F (37.1  C)   Resp 16   Ht 1.6 m (5' 3\")   Wt 65.2 kg (143 lb 11.2 oz)   SpO2 95%   BMI 25.46 kg/m    GENERAL APPEARANCE:  Alert, in no distress  RESP:  respiratory effort and palpation of chest normal, auscultation of lungs clear at this time. , no respiratory distress  CV:  Palpation and auscultation of heart done , rate and rhythm tachy 100, no murmur, no peripheral edema  ABDOMEN:  normal bowel sounds, soft, nontender, no hepatosplenomegaly or other masses  M/S:   Gait and station w/c dependent, Digits and nails intact  SKIN:  Inspection and Palpation of skin and subcutaneous tissue thin/frail  NEURO: 2-12 in normal limits and at patient's baseline  PSYCH:  insight and judgement, memory poor- baseline , affect and mood Calm.     No recent labs - due to hospice.     Assessment/Plan:     Clinical diagnosis of COVID-19  Alzheimer's disease of other onset with behavioral disturbance (H)  Chronic systolic congestive heart failure (H)  Hospice care     Currently stable, but high " risk of decline given frailty and notable wet cough from nsg yesterday, tachycardia today.   Cont good nsg cares and update hospice/providers as needed if condition changes.     No orders    Electronically signed by:  VALERIE Epps CNP            Sincerely,        VALERIE Epps CNP

## 2021-01-13 NOTE — PROGRESS NOTES
"[place] [facility]    Chief Complaint   Patient presents with     Nursing Home Acute     HPI:    Sherri Bender is a 88 year old  (6/6/1932), who is being seen today for an episodic care visit at: [facility] ([facility]) [[id number]]  Today's concern is: f/u in condition due to COVID   Please see 1/4 NP note for romo summary of events. Sherri is on hospice 2/2 dementia and tested + for COVID on 12/31.  She is fairly averbal - noted sbp range 90 - 150s, HR stable except today was 100's. Resting comfortable in broda w/c - awakes with light touch and voice and can focus her eyes on me, but can't answer questions. Nsg noted wet cough in last 24 hrs.     PMH/PSH reviewed in EPIC today.  REVIEW OF SYSTEMS:  Unobtainable secondary to cognitive impairment.     EXAM:  /67   Pulse 84   Temp 98.7  F (37.1  C)   Resp 16   Ht 1.6 m (5' 3\")   Wt 65.2 kg (143 lb 11.2 oz)   SpO2 95%   BMI 25.46 kg/m    GENERAL APPEARANCE:  Alert, in no distress  RESP:  respiratory effort and palpation of chest normal, auscultation of lungs clear at this time. , no respiratory distress  CV:  Palpation and auscultation of heart done , rate and rhythm tachy 100, no murmur, no peripheral edema  ABDOMEN:  normal bowel sounds, soft, nontender, no hepatosplenomegaly or other masses  M/S:   Gait and station w/c dependent, Digits and nails intact  SKIN:  Inspection and Palpation of skin and subcutaneous tissue thin/frail  NEURO: 2-12 in normal limits and at patient's baseline  PSYCH:  insight and judgement, memory poor- baseline , affect and mood Calm.     No recent labs - due to hospice.     Assessment/Plan:     Clinical diagnosis of COVID-19  Alzheimer's disease of other onset with behavioral disturbance (H)  Chronic systolic congestive heart failure (H)  Hospice care     Currently stable, but high risk of decline given frailty and notable wet cough from nsg yesterday, tachycardia today.   Cont good nsg cares and " update hospice/providers as needed if condition changes.     No orders    Electronically signed by:  VALERIE Epps CNP

## 2021-01-14 ENCOUNTER — NURSING HOME VISIT (OUTPATIENT)
Dept: GERIATRICS | Facility: CLINIC | Age: 86
End: 2021-01-14
Payer: COMMERCIAL

## 2021-01-14 VITALS
HEIGHT: 63 IN | SYSTOLIC BLOOD PRESSURE: 110 MMHG | HEART RATE: 94 BPM | DIASTOLIC BLOOD PRESSURE: 65 MMHG | RESPIRATION RATE: 18 BRPM | TEMPERATURE: 98.9 F | WEIGHT: 145.5 LBS | BODY MASS INDEX: 25.78 KG/M2 | OXYGEN SATURATION: 92 %

## 2021-01-14 DIAGNOSIS — I10 ESSENTIAL HYPERTENSION: ICD-10-CM

## 2021-01-14 DIAGNOSIS — I50.22 CHRONIC SYSTOLIC CONGESTIVE HEART FAILURE (H): ICD-10-CM

## 2021-01-14 DIAGNOSIS — F02.818 ALZHEIMER'S DISEASE OF OTHER ONSET WITH BEHAVIORAL DISTURBANCE: ICD-10-CM

## 2021-01-14 DIAGNOSIS — G30.8 ALZHEIMER'S DISEASE OF OTHER ONSET WITH BEHAVIORAL DISTURBANCE: ICD-10-CM

## 2021-01-14 DIAGNOSIS — U07.1 CLINICAL DIAGNOSIS OF COVID-19: Primary | ICD-10-CM

## 2021-01-14 PROCEDURE — 99309 SBSQ NF CARE MODERATE MDM 30: CPT | Mod: GW | Performed by: NURSE PRACTITIONER

## 2021-01-14 ASSESSMENT — MIFFLIN-ST. JEOR: SCORE: 1059.11

## 2021-01-14 NOTE — LETTER
"    1/14/2021        RE: Sherri Bender  Counts include 234 beds at the [facility]  [address] [address] [address]        [place] [facility]  [place] Medical Record Number:  [id number]  Place of Service where encounter took place:  [facility] [[id number]]  Chief Complaint   Patient presents with     Nursing Home Acute       HPI:    Sherri Bender  is a 88 year old (6/6/1932), who is being seen today for an episodic care visit at: [facility] [[id number]].        Brief History:  Sherri has been a resident at [facility] since 1/2018.  Her past medical history is significant for dementia with paranoia, depression, CHF, hypertension.  She has been enrolled with [facility] since 10/2018.  She is unable to answer simple questions, therefore assessment of orientation is not possible.  Her condition has remained relatively stable over last months, gradual decline in ability to care for self has been noted    Recent changes:    12/31/20 - covid viral infection    Today's concern is:     Clinical diagnosis of COVID-19  Alzheimer's disease of other onset with behavioral disturbance (H)  Chronic systolic congestive heart failure (H)  Essential hypertension       Tested positive on 12/31/20.    Day 14 since diagnosis     PRN Tylenol, zofran, O2 available      Currently experiencing minimal symptoms of dry cough, occasional tachycardia    Sherri is unable to meaningfully communicate due to her advanced dementia.  Nursing reports no recent fever, occasional tachycardia noted, and occasional dry cough.     Past Medical, Surgical, and Family History reviewed in Epic today.    MEDICATIONS:  Current medication list reviewed    REVIEW OF SYSTEMS:  Unobtainable secondary to cognitive impairment.     OBJECTIVE:  /65   Pulse 94   Temp 98.9  F (37.2  C)   Resp 18   Ht 1.6 m (5' 3\")   Wt 66 kg (145 lb 8 oz)   SpO2 92%   BMI 25.77 kg/m    Exam:  GENERAL " APPEARANCE:  Alert, in no apparent distress sitting in chair dozing and mumbling  CHEST/RESP:  respiratory effort  normal, no respiratory distress, lung sounds fine crackles in bases  CV:  Rate and rhythm reg, no murmur/rub/gallop, no peripheral edema  M/S:   gait & station abnormal-wheelchair dependent for mobility , digits and nails within normal limits   PSYCH:  at baseline mentation, alert, mumbles words, unable to make meaningful conversation    Labs:   no labs, on hospice    ASSESSMENT/PLAN:  Clinical diagnosis of COVID-19  Resolving symptoms of COVID, has completed >14 days of quarantine with resolution of symptoms  -ok to remove from droplet precautions  -discontinue prn zofran started as a result of COVID + diagnosis   -discontinue tylenol orders started as a result of COVID + diagnosis  -discontinue prn oxygen started as a result of COVID + diagnosis   -discontinue hold orders on insulin, diuretics, antihypertensives started as a result of COVID + diagnosis   -discontinue nutritional supplements as weight stabilizes and PO intake improves started as a result of COVID + diagnosis      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 Maintain safe living situation with goals focused on comfort.      Chronic systolic congestive heart failure (H)  Stable without new symptoms of edema or new dyspnea.      Essential hypertension  Stable, not on medications.   BP Readings from Last 3 Encounters:   01/14/21 110/65   01/12/21 105/67   01/04/21 122/80          Orders written by provider at facility    OK to move from covid unit    Electronically signed by:  VALERIE Shultz CNP            Sincerely,        VALERIE Shultz CNP

## 2021-01-16 NOTE — PROGRESS NOTES
"[place] [facility]  [place] Medical Record Number:  [id number]  Place of Service where encounter took place:  [facility] [[id number]]  Chief Complaint   Patient presents with     Nursing Home Acute       HPI:    Sherri Bender  is a 88 year old (6/6/1932), who is being seen today for an episodic care visit at: [facility] ([facility]) [[id number]].        Brief History:  Sherri has been a resident at [facility] since 1/2018.  Her past medical history is significant for dementia with paranoia, depression, CHF, hypertension.  She has been enrolled with [facility] since 10/2018.  She is unable to answer simple questions, therefore assessment of orientation is not possible.  Her condition has remained relatively stable over last months, gradual decline in ability to care for self has been noted    Recent changes:    12/31/20 - covid viral infection    Today's concern is:     Clinical diagnosis of COVID-19  Alzheimer's disease of other onset with behavioral disturbance (H)  Chronic systolic congestive heart failure (H)  Essential hypertension       Tested positive on 12/31/20.    Day 14 since diagnosis     PRN Tylenol, zofran, O2 available      Currently experiencing minimal symptoms of dry cough, occasional tachycardia    Sherri is unable to meaningfully communicate due to her advanced dementia.  Nursing reports no recent fever, occasional tachycardia noted, and occasional dry cough.     Past Medical, Surgical, and Family History reviewed in Epic today.    MEDICATIONS:  Current medication list reviewed    REVIEW OF SYSTEMS:  Unobtainable secondary to cognitive impairment.     OBJECTIVE:  /65   Pulse 94   Temp 98.9  F (37.2  C)   Resp 18   Ht 1.6 m (5' 3\")   Wt 66 kg (145 lb 8 oz)   SpO2 92%   BMI 25.77 kg/m    Exam:  GENERAL APPEARANCE:  Alert, in no apparent distress sitting in chair dozing and mumbling  CHEST/RESP:  respiratory effort  normal, no " respiratory distress, lung sounds fine crackles in bases  CV:  Rate and rhythm reg, no murmur/rub/gallop, no peripheral edema  M/S:   gait & station abnormal-wheelchair dependent for mobility , digits and nails within normal limits   PSYCH:  at baseline mentation, alert, mumbles words, unable to make meaningful conversation    Labs:   no labs, on hospice    ASSESSMENT/PLAN:  Clinical diagnosis of COVID-19  Resolving symptoms of COVID, has completed >14 days of quarantine with resolution of symptoms  -ok to remove from droplet precautions  -discontinue prn zofran started as a result of COVID + diagnosis   -discontinue tylenol orders started as a result of COVID + diagnosis  -discontinue prn oxygen started as a result of COVID + diagnosis   -discontinue hold orders on insulin, diuretics, antihypertensives started as a result of COVID + diagnosis   -discontinue nutritional supplements as weight stabilizes and PO intake improves started as a result of COVID + diagnosis      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 Maintain safe living situation with goals focused on comfort.      Chronic systolic congestive heart failure (H)  Stable without new symptoms of edema or new dyspnea.      Essential hypertension  Stable, not on medications.   BP Readings from Last 3 Encounters:   01/14/21 110/65   01/12/21 105/67   01/04/21 122/80          Orders written by provider at facility    OK to move from covid unit    Electronically signed by:  VALERIE Shultz CNP

## 2021-01-22 DIAGNOSIS — F41.9 ANXIETY: Primary | ICD-10-CM

## 2021-01-22 RX ORDER — LORAZEPAM 2 MG/ML
0.5 CONCENTRATE ORAL AT BEDTIME
Qty: 30 ML | Refills: 3 | Status: SHIPPED | OUTPATIENT
Start: 2021-01-22 | End: 2023-01-01

## 2021-02-08 ENCOUNTER — VIRTUAL VISIT (OUTPATIENT)
Dept: GERIATRICS | Facility: CLINIC | Age: 86
End: 2021-02-08
Payer: COMMERCIAL

## 2021-02-08 VITALS
DIASTOLIC BLOOD PRESSURE: 67 MMHG | HEIGHT: 63 IN | TEMPERATURE: 97.7 F | WEIGHT: 143.9 LBS | HEART RATE: 74 BPM | RESPIRATION RATE: 18 BRPM | SYSTOLIC BLOOD PRESSURE: 110 MMHG | OXYGEN SATURATION: 97 % | BODY MASS INDEX: 25.5 KG/M2

## 2021-02-08 DIAGNOSIS — F02.818 ALZHEIMER'S DISEASE OF OTHER ONSET WITH BEHAVIORAL DISTURBANCE: ICD-10-CM

## 2021-02-08 DIAGNOSIS — I50.22 CHRONIC SYSTOLIC CONGESTIVE HEART FAILURE (H): Primary | ICD-10-CM

## 2021-02-08 DIAGNOSIS — R54 FRAIL ELDERLY: ICD-10-CM

## 2021-02-08 DIAGNOSIS — F32.0 MILD MAJOR DEPRESSION (H): ICD-10-CM

## 2021-02-08 DIAGNOSIS — F22 PARANOIA (H): ICD-10-CM

## 2021-02-08 DIAGNOSIS — I10 ESSENTIAL HYPERTENSION: ICD-10-CM

## 2021-02-08 DIAGNOSIS — G30.8 ALZHEIMER'S DISEASE OF OTHER ONSET WITH BEHAVIORAL DISTURBANCE: ICD-10-CM

## 2021-02-08 DIAGNOSIS — Z51.5 HOSPICE CARE: ICD-10-CM

## 2021-02-08 PROCEDURE — 99309 SBSQ NF CARE MODERATE MDM 30: CPT | Mod: GT | Performed by: FAMILY MEDICINE

## 2021-02-08 ASSESSMENT — MIFFLIN-ST. JEOR: SCORE: 1051.86

## 2021-02-08 NOTE — LETTER
"    2/8/2021        RE: Sherri Gustafson  [address] [address] [address]        [place] [facility] Regulatory   Sherri Bender is being evaluated via a billable video visit due to the restrictions of the Covid-19 pandemic.   The patient has been notified of following:  \"This video visit will be conducted via a call between you and your provider. We have found that certain health care needs can be provided without the need for an in-person physical exam.  This service lets us provide the care you need with a video conversation. If during the course of the call the provider feels a video visit is not appropriate, you will not be charged for this service.\"   The provider has received verbal consent for a Video Visit from the patient or first contact? Yes  Patient or facility staff would like the video invitation sent by: N/A   Video Start Time: 15:16  [place] Medical Record Number:  [id number]  Place of Location at the time of visit: [facility]  Chief Complaint   Patient presents with     intermediate Regulatory     Video Visit   HPI:    Sherri Bender is a 86 year old  (6/6/1932), who is being seen today for a federally mandated E/M visit.  HPI information obtained from: facility chart records, facility staff, patient report and [facility] chart review.     Today's concerns are:  - Pt seen in the presence of RN who graciously assisted with the virtual visit  * CHF: RN reports no legs edema.    *AD, hospice ([facility]): RN reports no concern. Pt reports appetite is fine.   --------------------------------  - - Past Medical, social, family histories, medications, and allergies reviewed and updated  - Medications reviewed: in the chart and EHR.   - Case Management:   I have reviewed the care plan and MDS and do agree with the plan. Patient's desire to return to the community is not present.  Information reviewed:  Medications, vital signs, orders, " and nursing notes.    MEDICATIONS:  Current Outpatient Medications   Medication Sig Dispense Refill     Acetaminophen (TYLENOL PO) Take 1,000 mg by mouth 3 times daily       bisacodyl (DULCOLAX) 10 MG Suppository Place 10 mg rectally daily as needed for constipation       guaiFENesin (ROBITUSSIN) 100 MG/5ML SYRP Take 20 mLs by mouth every 4 hours as needed for cough       LORazepam (ATIVAN) 2 MG/ML (HIGH CONC) solution Take 0.25 mLs (0.5 mg) by mouth At Bedtime 30 mL 3     LORazepam (ATIVAN) 2 MG/ML (HIGH CONC) solution Take 0.5 mg by mouth At Bedtime       morphine sulfate 20 MG/5ML SOLN Take 0.25 mL by mouth every 1 hour as needed       polyethylene glycol (MIRALAX/GLYCOLAX) Packet Take 17 g by mouth daily       senna-docusate (SENOKOT-S;PERICOLACE) 8.6-50 MG per tablet Take 1 tablet by mouth 2 times daily       Sertraline HCl (ZOLOFT PO) Take 25 mg by mouth daily       TRAMADOL HCL PO Take 25 mg by mouth 2 times daily        TRAZODONE HCL PO Take 50 mg by mouth At Bedtime     ROS: Unobtainable secondary to cognitive impairment except as in HPI  Exam:  BP Readings from Last 3 Encounters:   02/08/21 110/67   01/14/21 110/65   01/12/21 105/67     Pulse Readings from Last 4 Encounters:   02/08/21 74   01/14/21 94   01/12/21 84   01/04/21 71   Limited visit exam done given COVID-19 precautions.   OE:   GENERAL APPEARANCE: no distress, tired looking.    RESP:  unlabored breathing  SKIN: thin.   NEURO:   no NFD appreciated on observation  MSK: no legs edema noted.   PSYCH: pleasantly confused.  non verbal.       Lab/Diagnostic data: none new to review.     ASSESSMENT/PLAN  ---------------------------------  Systolic congestive heart failure, unspecified congestive heart failure chronicity (H)  HTN, essential  - compensated     Frail elderly:  Significant  Deficits requiring NH placement. Requiring extensive assistance from nursing. 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  on sertraline  and zoloft, stable.  - Continue to anticipate needs. Chronic condition, ongoing decline expected.   -  Continue to provide redirection and reassurance as needed. Maintain safe living situation with goals focused on comfort.       Hospice Care:  Appreciate collaboration with  hospice team for symptom management in collaboration with cares for maximum comfort at end-of-life    Orders: See above, otherwise, continue the rest of the current POC.     Electronically signed by:  Aquiles Hernandez MD    Video-Visit Details  Type of service:  Video Visit  Video End Time (time video stopped): 15:17  Distant Location (provider location):  [place] [facility]         Sincerely,        Aquiles Hernandez MD

## 2021-02-08 NOTE — PROGRESS NOTES
"[place] [facility] Reg[facility]   Sherri Bender is being evaluated via a billable video visit due to the restrictions of the Covid-19 pandemic.   The patient has been notified of following:  \"This video visit will be conducted via a call between you and your provider. We have found that certain health care needs can be provided without the need for an in-person physical exam.  This service lets us provide the care you need with a video conversation. If during the course of the call the provider feels a video visit is not appropriate, you will not be charged for this service.\"   The provider has received verbal consent for a Video Visit from the patient or first contact? Yes  Patient or facility staff would like the video invitation sent by: N/A   Video Start Time: 15:16  [place] Medical Record Number:  [id number]  [facility] of Location at the time of visit[facility]  Chief Complaint   Patient presents with     [facility] Regulatory     Video Visit   HPI:    Sherri Bender is a 86 year old  (6/6/1932), who is being seen today for a federally mandated E/M visit.  HPI information obtained from: facility chart records, facility staff, patient report and [place] Epic chart review.     Today's concerns are:  - Pt seen in the presence of RN who graciously assisted with the virtual visit  * CHF: RN reports no legs edema.    *[facility] RN reports no concern. Pt reports appetite is fine.   --------------------------------  - - Past Medical, social, family histories, medications, and allergies reviewed and updated  - Medications reviewed: in the chart and EHR.   - Case Management:   I have reviewed the care plan and MDS and do agree with the plan. Patient's desire to return to the community is not present.  Information reviewed:  Medications, vital signs, orders, and nursing notes.    MEDICATIONS:  Current Outpatient Medications   Medication Sig Dispense Refill     " Acetaminophen (TYLENOL PO) Take 1,000 mg by mouth 3 times daily       bisacodyl (DULCOLAX) 10 MG Suppository Place 10 mg rectally daily as needed for constipation       guaiFENesin (ROBITUSSIN) 100 MG/5ML SYRP Take 20 mLs by mouth every 4 hours as needed for cough       LORazepam (ATIVAN) 2 MG/ML (HIGH CONC) solution Take 0.25 mLs (0.5 mg) by mouth At Bedtime 30 mL 3     LORazepam (ATIVAN) 2 MG/ML (HIGH CONC) solution Take 0.5 mg by mouth At Bedtime       morphine sulfate 20 MG/5ML SOLN Take 0.25 mL by mouth every 1 hour as needed       polyethylene glycol (MIRALAX/GLYCOLAX) Packet Take 17 g by mouth daily       senna-docusate (SENOKOT-S;PERICOLACE) 8.6-50 MG per tablet Take 1 tablet by mouth 2 times daily       Sertraline HCl (ZOLOFT PO) Take 25 mg by mouth daily       TRAMADOL HCL PO Take 25 mg by mouth 2 times daily        TRAZODONE HCL PO Take 50 mg by mouth At Bedtime     ROS: Unobtainable secondary to cognitive impairment except as in HPI  Exam:  BP Readings from Last 3 Encounters:   02/08/21 110/67   01/14/21 110/65   01/12/21 105/67     Pulse Readings from Last 4 Encounters:   02/08/21 74   01/14/21 94   01/12/21 84   01/04/21 71   Limited visit exam done given COVID-19 precautions.   OE:   GENERAL APPEARANCE: no distress, tired looking.    RESP:  unlabored breathing  SKIN: thin.   NEURO:   no NFD appreciated on observation  MSK: no legs edema noted.   PSYCH: pleasantly confused.  non verbal.       Lab/Diagnostic data: none new to review.     ASSESSMENT/PLAN  ---------------------------------  Systolic congestive heart failure, unspecified congestive heart failure chronicity (H)  HTN, essential  - compensated     Frail elderly:  Significant  Deficits requiring NH placement. Requiring extensive assistance from nursing. Up for meals only o/w spends the day resting in bed    Alzheimer's disease of other onset without behavioral disturbance (H)  Paranoia (H)  Mild single current episode of major repressive d/o  (H)  Insomnia:  - off donepezil.  on sertraline  and zoloft, stable.  - Continue to anticipate needs. Chronic condition, ongoing decline expected.   -  Continue to provide redirection and reassurance as needed. Maintain safe living situation with goals focused on comfort.       Hospice Care:  Appreciate collaboration with  hospice team for symptom management in collaboration with cares for maximum comfort at end-of-life    Orders: See above, otherwise, continue the rest of the current POC.     Electronically signed by:  Aquiles Hernandez MD    Video-Visit Details  Type of service:  Video Visit  Video End Time (time video stopped): 15:17  Distant Location (provider location):  [place] [facility]

## 2021-03-16 ENCOUNTER — NURSING HOME VISIT (OUTPATIENT)
Dept: GERIATRICS | Facility: CLINIC | Age: 86
End: 2021-03-16
Payer: COMMERCIAL

## 2021-03-16 VITALS
BODY MASS INDEX: 25.89 KG/M2 | DIASTOLIC BLOOD PRESSURE: 72 MMHG | TEMPERATURE: 97.7 F | HEART RATE: 89 BPM | RESPIRATION RATE: 16 BRPM | HEIGHT: 63 IN | OXYGEN SATURATION: 93 % | WEIGHT: 146.1 LBS | SYSTOLIC BLOOD PRESSURE: 100 MMHG

## 2021-03-16 DIAGNOSIS — F41.9 ANXIETY: ICD-10-CM

## 2021-03-16 DIAGNOSIS — G30.1 LATE ONSET ALZHEIMER'S DISEASE WITHOUT BEHAVIORAL DISTURBANCE (H): Primary | Chronic | ICD-10-CM

## 2021-03-16 DIAGNOSIS — F02.80 LATE ONSET ALZHEIMER'S DISEASE WITHOUT BEHAVIORAL DISTURBANCE (H): Primary | Chronic | ICD-10-CM

## 2021-03-16 DIAGNOSIS — F32.0 MILD MAJOR DEPRESSION (H): ICD-10-CM

## 2021-03-16 DIAGNOSIS — Z51.5 HOSPICE CARE: Chronic | ICD-10-CM

## 2021-03-16 DIAGNOSIS — I10 ESSENTIAL HYPERTENSION: ICD-10-CM

## 2021-03-16 DIAGNOSIS — I50.22 CHRONIC SYSTOLIC CONGESTIVE HEART FAILURE (H): ICD-10-CM

## 2021-03-16 PROCEDURE — 99309 SBSQ NF CARE MODERATE MDM 30: CPT | Mod: GV | Performed by: NURSE PRACTITIONER

## 2021-03-16 ASSESSMENT — MIFFLIN-ST. JEOR: SCORE: 1061.84

## 2021-03-16 NOTE — LETTER
3/16/2021        RE: Sherri Bender  [facility]  [address] [address] [address]        [place] [facility]    Chief Complaint   Patient presents with     Annual Comprehensive Nursing Home     [place] Medical Record Number: [id number]  Place of Service where encounter took place: [facility] ON [facility] () [[id number]]    Brief History:  Sherri has been a resident at [facility] since 1/2018. Her past medical history is significant for dementia with paranoia, depression, CHF, hypertension. She has been enrolled with [facility] since 10/2018.  She is unable to answer simple questions, therefore assessment of orientation is not possible. Her condition has remained relatively stable over last months, gradual decline in ability to care for self has been noted     Recent changes:    12/31/20 - covid viral infection without lingering symptoms     HPI:    Sherri Bender  is a 88 year old  (6/6/1932), who is being seen today for an annual comprehensive visit. HPI information obtained from: facility chart records, facility staff, patient report, [facility] chart review.  Today's concerns are:     Late onset Alzheimer's disease without behavioral disturbance (H)  Chronic systolic congestive heart failure (H)  Mild major depression (H)  Anxiety  Essential hypertension  Hospice care    Sherri has her eyes open and mumbles incoherently.  She is alert, resting comfortably in her chair.  Nursing reports no new concerns.  Hospice remains involved and visits regularly.       ALLERGIES: Patient has no known allergies.  PAST MEDICAL HISTORY:  has a past medical history of Dementia (H), Depression, Environmental allergies, Family history of ischemic heart disease (2/9/2010), HTN (hypertension), and Osteoarthritis.  PAST SURGICAL HISTORY:  has a past surgical history that includes appendectomy; surgical history of - ; surgical history of - ; tonsillectomy; cataract iol, rt/lt  (4/2010); and Colonoscopy (6/20/2011).  IMMUNIZATIONS:  Immunization History   Administered Date(s) Administered     FLU 6-35 months 12/09/2009, 10/25/2013     Influenza (High Dose) 3 valent vaccine 10/17/2011, 10/15/2015, 10/22/2015, 10/16/2019, 10/02/2020     Influenza (IIV3) PF 11/13/2007, 10/28/2008, 12/09/2009, 11/01/2010, 10/17/2011, 10/26/2012, 10/01/2013, 10/16/2019     Influenza Vaccine, 6+MO IM (QUADRIVALENT W/PRESERVATIVES) 10/23/2014     Pneumo Conj 13-V (2010&after) 10/27/2015     Pneumococcal 23 valent 12/20/2001, 11/21/2009     TD (ADULT, 7+) 11/20/2009     TDAP Vaccine (Adacel) 01/01/1990     Zoster vaccine, live 06/16/2014     Above immunizations pulled from [facility]. [facility] and facility records also reconciled. Outstanding information sent to  to update [facility].  Future immunizations are not needed at this point as all recommended immunizations are up to date.     Current Outpatient Medications   Medication Sig Dispense Refill     bisacodyl (DULCOLAX) 10 MG Suppository Place 10 mg rectally daily as needed for constipation       guaiFENesin (ROBITUSSIN) 100 MG/5ML SYRP Take 20 mLs by mouth every 4 hours as needed for cough       LORazepam (ATIVAN) 2 MG/ML (HIGH CONC) solution Take 0.25 mLs (0.5 mg) by mouth At Bedtime 30 mL 3     morphine sulfate 20 MG/5ML SOLN Take 0.25 mLs by mouth 2 times daily Take 0.25 mL by mouth every 1 hour as needed       polyethylene glycol (MIRALAX/GLYCOLAX) Packet Take 17 g by mouth daily       senna-docusate (SENOKOT-S;PERICOLACE) 8.6-50 MG per tablet Take 1 tablet by mouth 2 times daily       Sertraline HCl (ZOLOFT PO) Take 25 mg by mouth daily       TRAZODONE HCL PO Take 50 mg by mouth At Bedtime       Case Management:  I have reviewed the facility/SNF care plan/MDS, including the falls risk, nutrition and pain screening. I also reviewed the current immunizations, and preventive care. .Future cancer screening is not clinically indicated  "secondary to age/goals of care Patient's desire to return to the community is not assessible due to cognitive impairment. Current Level of Care is appropriate.    Advance Directive Discussion:    I reviewed the current advanced directives as reflected in EPIC, the POLST and the facility chart, and verified the congruency of orders. I did not contact the first party as plan of Care has been stable. I did not due to cognitive impairment review the advance directives with the resident.     Team Discussion:  I communicated with the appropriate disciplines involved with the Plan of Care: Nursing    Patient's goal is unobtainable secondary to cognitive impairment.  Information reviewed:  Medications, vital signs, orders, and nursing notes.    ROS:  Unobtainable secondary to cognitive impairment.     Vitals:  /72   Pulse 89   Temp 97.7  F (36.5  C)   Resp 16   Ht 1.6 m (5' 3\")   Wt 66.3 kg (146 lb 1.6 oz)   SpO2 93%   BMI 25.88 kg/m   Body mass index is 25.88 kg/m .  Exam:  GENERAL APPEARANCE:  Alert, in no apparent distress   HEAD:  Normal, normocephalic, atraumatic  EYE EXAM: normal external eye, conjunctiva, lids, CJ  NECK EXAM: supple, no JVD  CHEST/RESP:  respiratory effort normal, lung sounds CTA  , no respiratory distress  CV:  Rate reg, rhythm reg, no murmur, no peripheral edema   GI/ABDOMEN:  normal bowel sounds, soft, nontender, no palpable masses  M/S:   extremities normal, gait abnormal-unable to ambulate and uses rosa maria for all transfers, normal muscle tone, and range of motion within normal limits   SKIN EXAM: CDI   NEUROLOGIC EXAM: Cranial nerves 2-12 are normal tested and grossly at patient's baseline.  No tremor, gross motor movement at baseline.   PSYCH:  Alert and unable to determine orientation due to cognitive impairment , inability to answer questions    Lab/Diagnostic data:   no recent labs, on hospice    ASSESSMENT/PLAN  Late onset Alzheimer's disease without behavioral disturbance " (H)  Advanced disease, unable to communicate needs and all cares must be anticipated. Chronic condition, ongoing decline expected. Maintain safe living situation with goals focused on comfort.  Continues on hospice     Chronic systolic congestive heart failure (H)  Known history of CHF. On no routine meds.  Compensated, no dyspnea, no edema.  -The current medical regimen is effective;  continue present plan and medications.      Mild major depression (H)  Anxiety  Depression and anxiety well controlled on low dose sertraline, trazodone.  She has no symptoms of either, is peaceful most of the time and resting in her chair.  She has been successfully weaned off lorazepam.     Essential hypertension  BP Readings from Last 3 Encounters:   03/16/21 100/72   02/08/21 110/67   01/14/21 110/65      BP goals are <150/90 mm Hg.This is higher than ACC and AHA recommendations due to goals of care, risk of tissue/cerebral hypoperfusion and frailty. Patient is stable and continue without pharmacological invention with routine assessment.     [facility]- [facility]  Remains appropriately on hospice services for end of life care      Orders written by provider at facility    Discontinue guaifenesin     discontinue sertraline    Electronically signed by:  VALERIE Shultz CNP             Sincerely,        VALERIE Shultz CNP

## 2021-03-16 NOTE — PROGRESS NOTES
[place] [facility]    Chief Complaint   Patient presents with     Annual Comprehensive Nursing Home     [place] Medical Record Number: [id number]  Place of Service where encounter took place: [facility] () [[id number]]    Brief History:  Sherri has been a resident at [facility] since 1/2018. Her past medical history is significant for dementia with paranoia, depression, CHF, hypertension. She has been enrolled with [facility] since 10/2018.  She is unable to answer simple questions, therefore assessment of orientation is not possible. Her condition has remained relatively stable over last months, gradual decline in ability to care for self has been noted     Recent changes:    12/31/20 - covid viral infection without lingering symptoms     HPI:    Sherri Bender  is a 88 year old  (6/6/1932), who is being seen today for an annual comprehensive visit. HPI information obtained from: facility chart records, facility staff, patient report, [facility] chart review.  Today's concerns are:     Late onset Alzheimer's disease without behavioral disturbance (H)  Chronic systolic congestive heart failure (H)  Mild major depression (H)  Anxiety  Essential hypertension  Hospice care    Sherri has her eyes open and mumbles incoherently.  She is alert, resting comfortably in her chair.  Nursing reports no new concerns.  Hospice remains involved and visits regularly.       ALLERGIES: Patient has no known allergies.  PAST MEDICAL HISTORY:  has a past medical history of Dementia (H), Depression, Environmental allergies, Family history of ischemic heart disease (2/9/2010), HTN (hypertension), and Osteoarthritis.  PAST SURGICAL HISTORY:  has a past surgical history that includes appendectomy; surgical history of - ; surgical history of - ; tonsillectomy; cataract iol, rt/lt (4/2010); and Colonoscopy (6/20/2011).  IMMUNIZATIONS:  Immunization History   Administered Date(s) Administered     FLU 6-35  months 12/09/2009, 10/25/2013     Influenza (High Dose) 3 valent vaccine 10/17/2011, 10/15/2015, 10/22/2015, 10/16/2019, 10/02/2020     Influenza (IIV3) PF 11/13/2007, 10/28/2008, 12/09/2009, 11/01/2010, 10/17/2011, 10/26/2012, 10/01/2013, 10/16/2019     Influenza Vaccine, 6+MO IM (QUADRIVALENT W/PRESERVATIVES) 10/23/2014     Pneumo Conj 13-V (2010&after) 10/27/2015     Pneumococcal 23 valent 12/20/2001, 11/21/2009     TD (ADULT, 7+) 11/20/2009     TDAP Vaccine (Adacel) 01/01/1990     Zoster vaccine, live 06/16/2014     Above immunizations pulled from [facility]. [facility] and facility records also reconciled. Outstanding information sent to  to update [facility].  Future immunizations are not needed at this point as all recommended immunizations are up to date.     Current Outpatient Medications   Medication Sig Dispense Refill     bisacodyl (DULCOLAX) 10 MG Suppository Place 10 mg rectally daily as needed for constipation       guaiFENesin (ROBITUSSIN) 100 MG/5ML SYRP Take 20 mLs by mouth every 4 hours as needed for cough       LORazepam (ATIVAN) 2 MG/ML (HIGH CONC) solution Take 0.25 mLs (0.5 mg) by mouth At Bedtime 30 mL 3     morphine sulfate 20 MG/5ML SOLN Take 0.25 mLs by mouth 2 times daily Take 0.25 mL by mouth every 1 hour as needed       polyethylene glycol (MIRALAX/GLYCOLAX) Packet Take 17 g by mouth daily       senna-docusate (SENOKOT-S;PERICOLACE) 8.6-50 MG per tablet Take 1 tablet by mouth 2 times daily       Sertraline HCl (ZOLOFT PO) Take 25 mg by mouth daily       TRAZODONE HCL PO Take 50 mg by mouth At Bedtime       Case Management:  I have reviewed the facility/SNF care plan/MDS, including the falls risk, nutrition and pain screening. I also reviewed the current immunizations, and preventive care. .Future cancer screening is not clinically indicated secondary to age/goals of care Patient's desire to return to the community is not assessible due to cognitive impairment. Current  "Level of Care is appropriate.    Advance Directive Discussion:    I reviewed the current advanced directives as reflected in EPIC, the POLST and the facility chart, and verified the congruency of orders. I did not contact the first party as plan of Care has been stable. I did not due to cognitive impairment review the advance directives with the resident.     Team Discussion:  I communicated with the appropriate disciplines involved with the Plan of Care: Nursing    Patient's goal is unobtainable secondary to cognitive impairment.  Information reviewed:  Medications, vital signs, orders, and nursing notes.    ROS:  Unobtainable secondary to cognitive impairment.     Vitals:  /72   Pulse 89   Temp 97.7  F (36.5  C)   Resp 16   Ht 1.6 m (5' 3\")   Wt 66.3 kg (146 lb 1.6 oz)   SpO2 93%   BMI 25.88 kg/m   Body mass index is 25.88 kg/m .  Exam:  GENERAL APPEARANCE:  Alert, in no apparent distress   HEAD:  Normal, normocephalic, atraumatic  EYE EXAM: normal external eye, conjunctiva, lids, CJ  NECK EXAM: supple, no JVD  CHEST/RESP:  respiratory effort normal, lung sounds CTA  , no respiratory distress  CV:  Rate reg, rhythm reg, no murmur, no peripheral edema   GI/ABDOMEN:  normal bowel sounds, soft, nontender, no palpable masses  M/S:   extremities normal, gait abnormal-unable to ambulate and uses rosa maria for all transfers, normal muscle tone, and range of motion within normal limits   SKIN EXAM: CDI   NEUROLOGIC EXAM: Cranial nerves 2-12 are normal tested and grossly at patient's baseline.  No tremor, gross motor movement at baseline.   PSYCH:  Alert and unable to determine orientation due to cognitive impairment , inability to answer questions    Lab/Diagnostic data:   no recent labs, on hospice    ASSESSMENT/PLAN  Late onset Alzheimer's disease without behavioral disturbance (H)  Advanced disease, unable to communicate needs and all cares must be anticipated. Chronic condition, ongoing decline expected. " Maintain safe living situation with goals focused on comfort.  Continues on hospice     Chronic systolic congestive heart failure (H)  Known history of CHF. On no routine meds.  Compensated, no dyspnea, no edema.  -The current medical regimen is effective;  continue present plan and medications.      Mild major depression (H)  Anxiety  Depression and anxiety well controlled on low dose sertraline, trazodone.  She has no symptoms of either, is peaceful most of the time and resting in her chair.  She has been successfully weaned off lorazepam.     Essential hypertension  BP Readings from Last 3 Encounters:   03/16/21 100/72   02/08/21 110/67   01/14/21 110/65      BP goals are <150/90 mm Hg.This is higher than ACC and AHA recommendations due to goals of care, risk of tissue/cerebral hypoperfusion and frailty. Patient is stable and continue without pharmacological invention with routine assessment.     [facility] care- [facility]  Remains appropriately on hospice services for end of life care      Orders written by provider at facility    Discontinue guaifenesin     discontinue sertraline    Electronically signed by:  VALERIE Shultz CNP

## 2021-04-13 NOTE — PROGRESS NOTES
[facility]  Chief Complaint   Patient presents with     [facility]   [place] MRN: [id number]. Place of Service where encounter took place:  [facility] () [[id number]] HPI: Sherri Bender  is 88 year old (6/6/1932), who is being seen today for a federally mandated E/M visit.  HPI information obtained from: facility chart records, facility staff, patient report, [facility] chart review, and Care Everywhere [facility] chart review. Today's concerns are:    [place] seen today per government mandate.  Nursing has no new concerns, and patient is enrolled in hospice services.  Today, Sherri is alert, and relaxing in the day room.  She is mostly nonverbal, but responds to questioning with attempted verbalizations and eye contact.  She is otherwise unable to contribute to HPI or Ros.  Chart review shows that her vital signs are at baseline, and most recent changes are included below.    Recent changes:    March: DC'd Guaifenesin and Zoloft.    Dec 2020: COVID19 infection.    ALLERGIES:Patient has no known allergies.PAST MEDICAL HISTORY:   has a past medical history of Dementia (H), Depression, Environmental allergies, Family history of ischemic heart disease (2/9/2010), HTN (hypertension), and Osteoarthritis. PAST SURGICAL HISTORY:   has a past surgical history that includes appendectomy; surgical history of - ; surgical history of - ; tonsillectomy; cataract iol, rt/lt (4/2010); and Colonoscopy (6/20/2011).FAMILY HISTORY: family history includes Allergies in her brother; Cancer in her brother and mother; Heart Disease in her father and mother.SOCIAL HISTORY:  reports that she has quit smoking. She has never used smokeless tobacco. She reports that she does not drink alcohol or use drugs.  MEDICATIONS:  Current Outpatient Medications   Medication Sig Dispense Refill     guaiFENesin (ROBITUSSIN) 100 MG/5ML SYRP Take 20 mLs by mouth every 4 hours as needed for cough       LORazepam (ATIVAN) 2  "MG/ML (HIGH CONC) solution Take 0.25 mLs (0.5 mg) by mouth At Bedtime 30 mL 3     morphine sulfate 20 MG/5ML SOLN Take 0.25 mLs by mouth 2 times daily Take 0.25 mL by mouth every 1 hour as needed       polyethylene glycol (MIRALAX/GLYCOLAX) Packet Take 17 g by mouth daily       senna-docusate (SENOKOT-S;PERICOLACE) 8.6-50 MG per tablet Take 1 tablet by mouth 2 times daily       Sertraline HCl (ZOLOFT PO) Take 25 mg by mouth daily       TRAZODONE HCL PO Take 50 mg by mouth At Bedtime       Case Management:  I have reviewed the care plan and MDS and do agree with the plan. Patient's desire to return to the community is not assessible due to cognitive impairment. Information reviewed:  Medications, vital signs, orders, and nursing notes.    ROS: Limited secondary to cognitive impairment but today pt reports the above and 4 point ROS including Respiratory, CV, GI and , other than that noted in the HPI, is negative.    Vitals: /71   Pulse 92   Temp 98.6  F (37  C)   Resp 17   Ht 1.6 m (5' 3\")   Wt 67.5 kg (148 lb 14.4 oz)   SpO2 93%   BMI 26.38 kg/m    Exam:  GENERAL APPEARANCE: Alert, in no distress, cooperative.   ENT: Mouth/posterior oropharynx intact w/ moist mucous membranes, hearing acuity [place].  EYES: EOM, conjunctivae, lids, pupils and irises normal, PERRL2.   RESP: Respiratory effort unlabored, no respiratory distress, Lung sounds clear. On RA.   CV: Auscultation of heart reveals S1, S2, rate and rhythm regular, no murmur, no rub or gallop, Edema 0+ BLE. Peripheral pulses are 2+.  ABDOMEN: Normal bowel sounds, soft, non-tender abdomen, and no masses palpated.  SKIN: Inspection/Palpation of skin and subcutaneous tissue baseline w/ fragility. No wounds/rashes noted.   NEURO: CN II-XII at patient's baseline, sensation baseline PPS.  PSYCH: Insight, judgement, and memory are baseline impaired, affect and mood are withdrawn.    Lab/Diagnostic data:   Recent labs in [facility] reviewed by me today. "     ASSESSMENT/PLAN  Late onset Alzheimer's disease without behavioral disturbance (H)  Chronic systolic congestive heart failure (H)  Mild major depression (H)  Anxiety  Essential hypertension  Frail elderly  Hospice care- [facility]. Ongoing.    Sherri appears to be at her baseline.  There has been no PRN use of bowel meds, therefore will reduce polypharmacy and discontinue.  Standing house orders are available should Sherri experience any constipation.    Sherri does have occasional anxiety per chart review, and given her hospice status, will renew her PRN Ativan. Continuation of PRN order for non-antipsychotic psychotropic medication, is appropriate due to sporadic anxiety and end-stage palliative care goals and is being reordered today with an end date in 14 days.      Sherri's CHF and hypertension appear at baseline, and will continue plan of care here.  Follow-up routinely or as needed.    Orders written by provider at facility and transcribed by : Katelyn Zuniga  1. RENEW PRN Lorazepam x 14 days - dx: MASON  2. Discontinue M.O.M. (use LUCIO).  3. Discontinue Dulcalox (use LUCIO)    Electronically signed by:  Dr. Genia Titus, APRN CNP DNP

## 2021-04-14 ENCOUNTER — NURSING HOME VISIT (OUTPATIENT)
Dept: GERIATRICS | Facility: CLINIC | Age: 86
End: 2021-04-14
Payer: COMMERCIAL

## 2021-04-14 VITALS
TEMPERATURE: 98.6 F | OXYGEN SATURATION: 93 % | RESPIRATION RATE: 17 BRPM | SYSTOLIC BLOOD PRESSURE: 115 MMHG | BODY MASS INDEX: 26.38 KG/M2 | HEART RATE: 92 BPM | HEIGHT: 63 IN | WEIGHT: 148.9 LBS | DIASTOLIC BLOOD PRESSURE: 71 MMHG

## 2021-04-14 DIAGNOSIS — F32.0 MILD MAJOR DEPRESSION (H): ICD-10-CM

## 2021-04-14 DIAGNOSIS — R54 FRAIL ELDERLY: ICD-10-CM

## 2021-04-14 DIAGNOSIS — F02.80 LATE ONSET ALZHEIMER'S DISEASE WITHOUT BEHAVIORAL DISTURBANCE (H): Primary | ICD-10-CM

## 2021-04-14 DIAGNOSIS — G30.1 LATE ONSET ALZHEIMER'S DISEASE WITHOUT BEHAVIORAL DISTURBANCE (H): Primary | ICD-10-CM

## 2021-04-14 DIAGNOSIS — I50.22 CHRONIC SYSTOLIC CONGESTIVE HEART FAILURE (H): ICD-10-CM

## 2021-04-14 DIAGNOSIS — I10 ESSENTIAL HYPERTENSION: ICD-10-CM

## 2021-04-14 DIAGNOSIS — F41.9 ANXIETY: ICD-10-CM

## 2021-04-14 DIAGNOSIS — Z51.5 HOSPICE CARE: ICD-10-CM

## 2021-04-14 PROCEDURE — 99309 SBSQ NF CARE MODERATE MDM 30: CPT | Mod: GV | Performed by: NURSE PRACTITIONER

## 2021-04-14 ASSESSMENT — MIFFLIN-ST. JEOR: SCORE: 1074.54

## 2021-04-14 NOTE — LETTER
4/14/2021        RE: Sherri Bender  [facility]  [address] [facility] [address]        [facility] [facility]  Chief Complaint   Patient presents with     [facility] MRN: [id number]. Place of Service where encounter took place:  [facility] [place] () [[id number]] HPI: Sherri Bender  is 88 year old (6/6/1932), who is being seen today for a federally mandated E/M visit.  HPI information obtained from: facility chart records, facility staff, patient report, [facility] chart review, and Care Everywhere [facility] chart review. Today's concerns are:    [place] seen today per government mandate.  Nursing has no new concerns, and patient is enrolled in hospice services.  Today, Sherri is alert, and relaxing in the day room.  She is mostly nonverbal, but responds to questioning with attempted verbalizations and eye contact.  She is otherwise unable to contribute to HPI or Ros.  Chart review shows that her vital signs are at baseline, and most recent changes are included below.    Recent changes:    March: DC'd Guaifenesin and Zoloft.    Dec 2020: COVID19 infection.    ALLERGIES:Patient has no known allergies.PAST MEDICAL HISTORY:   has a past medical history of Dementia (H), Depression, Environmental allergies, Family history of ischemic heart disease (2/9/2010), HTN (hypertension), and Osteoarthritis. PAST SURGICAL HISTORY:   has a past surgical history that includes appendectomy; surgical history of - ; surgical history of - ; tonsillectomy; cataract iol, rt/lt (4/2010); and Colonoscopy (6/20/2011).FAMILY HISTORY: family history includes Allergies in her brother; Cancer in her brother and mother; Heart Disease in her father and mother.SOCIAL HISTORY:  reports that she has quit smoking. She has never used smokeless tobacco. She reports that she does not drink alcohol or use drugs.  MEDICATIONS:  Current Outpatient Medications   Medication Sig Dispense Refill      "guaiFENesin (ROBITUSSIN) 100 MG/5ML SYRP Take 20 mLs by mouth every 4 hours as needed for cough       LORazepam (ATIVAN) 2 MG/ML (HIGH CONC) solution Take 0.25 mLs (0.5 mg) by mouth At Bedtime 30 mL 3     morphine sulfate 20 MG/5ML SOLN Take 0.25 mLs by mouth 2 times daily Take 0.25 mL by mouth every 1 hour as needed       polyethylene glycol (MIRALAX/GLYCOLAX) Packet Take 17 g by mouth daily       senna-docusate (SENOKOT-S;PERICOLACE) 8.6-50 MG per tablet Take 1 tablet by mouth 2 times daily       Sertraline HCl (ZOLOFT PO) Take 25 mg by mouth daily       TRAZODONE HCL PO Take 50 mg by mouth At Bedtime       Case Management:  I have reviewed the care plan and MDS and do agree with the plan. Patient's desire to return to the community is not assessible due to cognitive impairment. Information reviewed:  Medications, vital signs, orders, and nursing notes.    ROS: Limited secondary to cognitive impairment but today pt reports the above and 4 point ROS including Respiratory, CV, GI and , other than that noted in the HPI, is negative.    Vitals: /71   Pulse 92   Temp 98.6  F (37  C)   Resp 17   Ht 1.6 m (5' 3\")   Wt 67.5 kg (148 lb 14.4 oz)   SpO2 93%   BMI 26.38 kg/m    Exam:  GENERAL APPEARANCE: Alert, in no distress, cooperative.   ENT: Mouth/posterior oropharynx intact w/ moist mucous membranes, hearing acuity [place].  EYES: EOM, conjunctivae, lids, pupils and irises normal, PERRL2.   RESP: Respiratory effort unlabored, no respiratory distress, Lung sounds clear. On RA.   CV: Auscultation of heart reveals S1, S2, rate and rhythm regular, no murmur, no rub or gallop, Edema 0+ BLE. Peripheral pulses are 2+.  ABDOMEN: Normal bowel sounds, soft, non-tender abdomen, and no masses palpated.  SKIN: Inspection/Palpation of skin and subcutaneous tissue baseline w/ fragility. No wounds/rashes noted.   NEURO: CN II-XII at patient's baseline, sensation baseline PPS.  PSYCH: Insight, judgement, and memory are " baseline impaired, affect and mood are withdrawn.    Lab/Diagnostic data:   Recent labs in EPIC reviewed by me today.     ASSESSMENT/PLAN  Late onset Alzheimer's disease without behavioral disturbance (H)  Chronic systolic congestive heart failure (H)  Mild major depression (H)  Anxiety  Essential hypertension  Frail elderly  Hospice care- [facility]. Ongoing.    Sherri appears to be at her baseline.  There has been no PRN use of bowel meds, therefore will reduce polypharmacy and discontinue.  Standing house orders are available should Sherri experience any constipation.    Sherri does have occasional anxiety per chart review, and given her hospice status, will renew her PRN Ativan. Continuation of PRN order for non-antipsychotic psychotropic medication, is appropriate due to sporadic anxiety and end-stage palliative care goals and is being reordered today with an end date in 14 days.      Sherri's CHF and hypertension appear at baseline, and will continue plan of care here.  Follow-up routinely or as needed.    Orders written by provider at facility and transcribed by : Katelyn Zuniga  1. RENEW PRN Lorazepam x 14 days - dx: MASON  2. Discontinue M.O.M. (use LUCIO).  3. Discontinue Dulcalox (use LUCIO)    Electronically signed by:  VALERIE Solano CNP DNP        Sincerely,        VALERIE Howard CNP

## 2021-06-14 ENCOUNTER — NURSING HOME VISIT (OUTPATIENT)
Dept: GERIATRICS | Facility: CLINIC | Age: 86
End: 2021-06-14
Payer: COMMERCIAL

## 2021-06-14 VITALS
TEMPERATURE: 98.4 F | DIASTOLIC BLOOD PRESSURE: 56 MMHG | HEIGHT: 63 IN | SYSTOLIC BLOOD PRESSURE: 101 MMHG | HEART RATE: 86 BPM | BODY MASS INDEX: 26.13 KG/M2 | WEIGHT: 147.5 LBS | RESPIRATION RATE: 19 BRPM | OXYGEN SATURATION: 94 %

## 2021-06-14 DIAGNOSIS — Z51.5 HOSPICE CARE: ICD-10-CM

## 2021-06-14 DIAGNOSIS — I50.22 CHRONIC SYSTOLIC CONGESTIVE HEART FAILURE (H): ICD-10-CM

## 2021-06-14 DIAGNOSIS — F02.80 LATE ONSET ALZHEIMER'S DISEASE WITHOUT BEHAVIORAL DISTURBANCE (H): Primary | ICD-10-CM

## 2021-06-14 DIAGNOSIS — I10 ESSENTIAL HYPERTENSION: ICD-10-CM

## 2021-06-14 DIAGNOSIS — F32.0 MILD MAJOR DEPRESSION (H): ICD-10-CM

## 2021-06-14 DIAGNOSIS — F22 PARANOIA (H): ICD-10-CM

## 2021-06-14 DIAGNOSIS — G30.1 LATE ONSET ALZHEIMER'S DISEASE WITHOUT BEHAVIORAL DISTURBANCE (H): Primary | ICD-10-CM

## 2021-06-14 PROCEDURE — 99309 SBSQ NF CARE MODERATE MDM 30: CPT | Performed by: FAMILY MEDICINE

## 2021-06-14 ASSESSMENT — MIFFLIN-ST. JEOR: SCORE: 1063.19

## 2021-06-14 NOTE — PROGRESS NOTES
"[place] [facility]  Chief Complaint   Patient presents with     snf Regulatory     [facility] Medical Record Number:  [id number]  Place of Service where encounter took place:  [facility] [place] () [[id number]]    HPI:    Sherri Bender  is 89 year old (6/6/1932), who is being seen today for a federally mandated E/M visit.  HPI information obtained from: facility staff and [facility] chart review.     Today's concerns are:   - Resident seen and examined.  RN and GNP have no concern. Resident progressively declining, continued to be enrolled in hospice care.   --------------------------------  - - Past Medical, social, family histories, medications, and allergies reviewed and updated  - Medications reviewed: in the chart and EHR.   - Case Management:   I have reviewed the care plan and MDS and do agree with the plan. Patient's desire to return to the community is not present.  Information reviewed:  Medications, vital signs, orders, and nursing notes.    MEDICATIONS:  Current Outpatient Medications   Medication Sig Dispense Refill     guaiFENesin (ROBITUSSIN) 100 MG/5ML SYRP Take 20 mLs by mouth every 4 hours as needed for cough       LORazepam (ATIVAN) 2 MG/ML (HIGH CONC) solution Take 0.25 mLs (0.5 mg) by mouth At Bedtime 30 mL 3     morphine sulfate 20 MG/5ML SOLN Take 0.25 mLs by mouth 2 times daily Take 0.25 mL by mouth every 1 hour as needed       polyethylene glycol (MIRALAX/GLYCOLAX) Packet Take 17 g by mouth daily       senna-docusate (SENOKOT-S;PERICOLACE) 8.6-50 MG per tablet Take 1 tablet by mouth 2 times daily       Sertraline HCl (ZOLOFT PO) Take 25 mg by mouth daily       TRAZODONE HCL PO Take 50 mg by mouth At Bedtime       ROS: Unobtainable secondary to cognitive impairment.     Vitals:  /56   Pulse 86   Temp 98.4  F (36.9  C)   Resp 19   Ht 1.6 m (5' 3\")   Wt 66.9 kg (147 lb 8 oz)   SpO2 94%   BMI 26.13 kg/m    Body mass index is 26.13 kg/m .  Exam:  GENERAL " APPEARANCE:  Laying in bed.   RESP:  lungs clear to auscultation   CV:  S1S2 audible, regular HR, no murmur appreciated.   ABDOMEN:  soft, NT/ND, BS audible. no mass appreciated on palpation.   NEURO:   No involuntary movements  Psych: anxious, non verbal.     Lab/Diagnostic data: no new data to review.       ASSESSMENT/PLAN  ---------------------------  Systolic congestive heart failure, unspecified congestive heart failure chronicity (H)  HTN, essential  - compensated. Not on meds.       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 Chronic condition, ongoing decline expected.   -  Continue to provide redirection and reassurance as needed. Maintain safe living situation with goals focused on comfort.  - progressively declining.   -Appreciate collaboration with  hospice team for symptom management in collaboration with cares for maximum comfort at end-of-life      Order: no new order      Electronically signed by:  Aquiles Hernandez MD

## 2021-06-14 NOTE — LETTER
6/14/2021        RE: Sher[facility]ender  [facility]  [address] [facility] [address]        [place] [facility]  Chief Complaint   Patient presents with     long-term Regulatory     [place] [facility] Record Number:  [id number]  Place of Service where encounter took place:  [facility] [place] () [[id number]]    HPI:    Sherri Bender  is 89 year old (6/6/1932), who is being seen today for a federally mandated E/M visit.  HPI information obtained from: facility staff and [facility] chart review.     Today's concerns are:   - Resident seen and examined.  RN and GNP have no concern. Resident progressively declining, continued to be enrolled in hospice care.   --------------------------------  - - Past Medical, social, family histories, medications, and allergies reviewed and updated  - Medications reviewed: in the chart and EHR.   - Case Management:   I have reviewed the care plan and MDS and do agree with the plan. Patient's desire to return to the community is not present.  Information reviewed:  Medications, vital signs, orders, and nursing notes.    MEDICATIONS:  Current Outpatient Medications   Medication Sig Dispense Refill     guaiFENesin (ROBITUSSIN) 100 MG/5ML SYRP Take 20 mLs by mouth every 4 hours as needed for cough       LORazepam (ATIVAN) 2 MG/ML (HIGH CONC) solution Take 0.25 mLs (0.5 mg) by mouth At Bedtime 30 mL 3     morphine sulfate 20 MG/5ML SOLN Take 0.25 mLs by mouth 2 times daily Take 0.25 mL by mouth every 1 hour as needed       polyethylene glycol (MIRALAX/GLYCOLAX) Packet Take 17 g by mouth daily       senna-docusate (SENOKOT-S;PERICOLACE) 8.6-50 MG per tablet Take 1 tablet by mouth 2 times daily       Sertraline HCl (ZOLOFT PO) Take 25 mg by mouth daily       TRAZODONE HCL PO Take 50 mg by mouth At Bedtime       ROS: Unobtainable secondary to cognitive impairment.     Vitals:  /56   Pulse 86   Temp 98.4  F (36.9  C)   Resp 19   Ht 1.6  "m (5' 3\")   Wt 66.9 kg (147 lb 8 oz)   SpO2 94%   BMI 26.13 kg/m    Body mass index is 26.13 kg/m .  Exam:  GENERAL APPEARANCE:  Laying in bed.   RESP:  lungs clear to auscultation   CV:  S1S2 audible, regular HR, no murmur appreciated.   ABDOMEN:  soft, NT/ND, BS audible. no mass appreciated on palpation.   NEURO:   No involuntary movements  Psych: anxious, non verbal.     Lab/Diagnostic data: no new data to review.       ASSESSMENT/PLAN  ---------------------------  Systolic congestive heart failure, unspecified congestive heart failure chronicity (H)  HTN, essential  - compensated. Not on meds.       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 Chronic condition, ongoing decline expected.   -  Continue to provide redirection and reassurance as needed. Maintain safe living situation with goals focused on comfort.  - progressively declining.   -Appreciate collaboration with  hospice team for symptom management in collaboration with cares for maximum comfort at end-of-life      Order: no new order      Electronically signed by:  Aquiles Hernandez MD        Sincerely,        Aquiles Hernandez MD    "

## 2021-06-29 NOTE — PROGRESS NOTES
[facility] Hospitalist Service      Subjective:  Upset  Wonders where  is    Review of Systems:  Unable to obtain due to confusion    Physical Exam:  Vitals Were Reviewed    Patient Vitals for the past 16 hrs:   BP Temp Temp src Pulse Resp SpO2   01/15/18 0730 116/59 98  F (36.7  C) Oral 81 18 95 %   01/15/18 0216 111/42 97.3  F (36.3  C) Oral 78 18 92 %   01/14/18 1914 142/67 98.3  F (36.8  C) Oral 77 18 94 %       No intake or output data in the 24 hours ending 01/15/18 1015    GENERAL APPEARANCE: tearful  EYES: conjunctiva clear, eyes grossly normal  HENT: external ears and nose normal   NECK: supple, no masses or adenopathy  RESP: lungs clear to auscultation - no rales, rhonchi or wheezes  CV: regular rate and rhythm, normal S1 S2, no S3 or S4 and no murmur, click or rub   ABDOMEN: soft, nontender, no HSM or masses and bowel sounds normal  MS: no clubbing, cyanosis; no edema  SKIN: clear without significant rashes or lesions  NEURO: knows she is in hospital, doesn't know time or events    Lab:  Recent Labs   Lab Test  01/14/18   1343  09/03/15   1659   NA  140  137   POTASSIUM  3.6  3.6   CHLORIDE  106  100   CO2  24  30   ANIONGAP  10  7   GLC  142*  86   BUN  17  16   CR  0.82  0.65   ZACK  8.6  9.3     CBC RESULTS:   Recent Labs   Lab Test  01/14/18   1343  08/26/13   1841   WBC  8.2  7.7   RBC  3.79*  4.21   HGB  11.7  12.8   HCT  35.3  39.3   PLT  226  239       Results for orders placed or performed during the hospital encounter of 01/14/18 (from the past 24 hour(s))   CBC with platelets differential   Result Value Ref Range    WBC 8.2 4.0 - 11.0 10e9/L    RBC Count 3.79 (L) 3.8 - 5.2 10e12/L    Hemoglobin 11.7 11.7 - 15.7 g/dL    Hematocrit 35.3 35.0 - 47.0 %    MCV 93 78 - 100 fl    MCH 30.9 26.5 - 33.0 pg    MCHC 33.1 31.5 - 36.5 g/dL    RDW 13.0 10.0 - 15.0 %    Platelet Count 226 150 - 450 10e9/L    Diff Method Automated Method     % Neutrophils 76.6 %    % Lymphocytes 15.4 %    % Monocytes  Health Maintenance Due   Topic Date Due   • COVID-19 Vaccine (1) Never done   • Shingles Vaccine (1 of 2) Never done   • Colorectal Cancer Screen-  Never done       Patient is due for topics as listed above but is not proceeding with Immunization(s) COVID-19 and Shingles and Colorectal Cancer Screening: Colonoscopy at this time.          7.0 %    % Eosinophils 0.7 %    % Basophils 0.2 %    % Immature Granulocytes 0.1 %    Absolute Neutrophil 6.3 1.6 - 8.3 10e9/L    Absolute Lymphocytes 1.3 0.8 - 5.3 10e9/L    Absolute Monocytes 0.6 0.0 - 1.3 10e9/L    Absolute Eosinophils 0.1 0.0 - 0.7 10e9/L    Absolute Basophils 0.0 0.0 - 0.2 10e9/L    Abs Immature Granulocytes 0.0 0 - 0.4 10e9/L   Comprehensive metabolic panel   Result Value Ref Range    Sodium 140 133 - 144 mmol/L    Potassium 3.6 3.4 - 5.3 mmol/L    Chloride 106 94 - 109 mmol/L    Carbon Dioxide 24 20 - 32 mmol/L    Anion Gap 10 3 - 14 mmol/L    Glucose 142 (H) 70 - 99 mg/dL    Urea Nitrogen 17 7 - 30 mg/dL    Creatinine 0.82 0.52 - 1.04 mg/dL    GFR Estimate 66 >60 mL/min/1.7m2    GFR Estimate If Black 80 >60 mL/min/1.7m2    Calcium 8.6 8.5 - 10.1 mg/dL    Bilirubin Total 0.3 0.2 - 1.3 mg/dL    Albumin 3.3 (L) 3.4 - 5.0 g/dL    Protein Total 6.6 (L) 6.8 - 8.8 g/dL    Alkaline Phosphatase 67 40 - 150 U/L    ALT 19 0 - 50 U/L    AST 18 0 - 45 U/L   CK total   Result Value Ref Range    CK Total 180 30 - 225 U/L   UA reflex to Microscopic   Result Value Ref Range    Color Urine Yellow     Appearance Urine Clear     Glucose Urine Negative NEG^Negative mg/dL    Bilirubin Urine Negative NEG^Negative    Ketones Urine Negative NEG^Negative mg/dL    Specific Gravity Urine 1.009 1.003 - 1.035    Blood Urine Negative NEG^Negative    pH Urine 5.0 5.0 - 7.0 pH    Protein Albumin Urine Negative NEG^Negative mg/dL    Urobilinogen mg/dL Normal 0.0 - 2.0 mg/dL    Nitrite Urine Negative NEG^Negative    Leukocyte Esterase Urine Negative NEG^Negative    Source Catheterized Urine    Head CT w/o contrast    Narrative    CT SCAN OF THE HEAD WITHOUT CONTRAST   1/14/2018 3:36 PM     HISTORY: Fall, found on stomach. Increasing falls at home.  Dementia.       TECHNIQUE:  Axial images of the head and coronal reformations without  IV contrast material. Radiation dose for this scan was reduced using  automated exposure  control, adjustment of the mA and/or kV according  to patient size, or iterative reconstruction technique.    COMPARISON: Head CT 9/4/2015.    FINDINGS: There is no evidence of intracranial hemorrhage, mass, acute  infarct or anomaly. There is generalized atrophy of the brain. There  is low attenuation in the white matter of the cerebral hemispheres  consistent with sequelae of small vessel ischemic disease.    The visualized portions of the sinuses and mastoids appear normal. The  bony calvarium and bones of the skull base appear intact. Bilateral  pseudophakia.      Impression    IMPRESSION:     1. No evidence of acute intracranial hemorrhage, mass, or herniation.  2. There is generalized atrophy of the brain. White matter changes are  present in the cerebral hemispheres that are consistent with small  vessel ischemic disease in this age patient.      PAYTON PRICE MD   Cervical spine CT w/o contrast    Narrative    CT CERVICAL SPINE WITHOUT CONTRAST   1/14/2018 3:37 PM     HISTORY: Fall, found on stomach.  Evaluate for acute bony process due  to trauma history of dementia increasing frequency of falls, Fall,  found on stomach.  Evaluate for acute bony process due to trauma  history of dementia increasing frequency of falls;      TECHNIQUE: Axial images of the cervical spine were obtained without  intravenous contrast. Multiplanar reformations were performed.   Radiation dose for this scan was reduced using automated exposure  control, adjustment of the mA and/or kV according to patient size, or  iterative reconstruction technique.    COMPARISON: None.    FINDINGS: There is no evidence of fracture. Alignment is normal.      Craniocervical junction: Normal.     C1-C2:  Calcified pannus is seen posterior to the odontoid. This can  be seen in patients with calcium pyrophosphate deposition disease.     C2-C3:  Mild annular disc bulge.     C3-C4:  Annular disc bulge. Central canal normal.     C4-C5:  Loss of disc space  height. Mild bulge. Mild degenerative  change in the facet joints.     C5-C6:  Loss of disc space height. Mild bulge. Degenerative change in  the facet joints, left greater than right.      C6-C7:  Mild annular disc bulge. Central canal normal.      C7-T1:   Normal disc, facet joints, spinal canal and neural foramina.      Impression    IMPRESSION:    1. Negative for fracture.  2. Degenerative change.    NALDO HUFFMAN MD   Pelvis XR w/ unilateral hip left    Narrative    PELVIS AND HIP LEFT ONE VIEW   1/14/2018 3:55 PM     HISTORY: Found on stomach, fall. Increasing falls. Unstable gait,  evaluate for bony process due to fall.    COMPARISON: None.      Impression    IMPRESSION: No acute fracture or dislocation identified. Anatomic  alignment. Pelvic calcifications could represent fibroids or ovarian  calcifications.       Assessment and Plan:    Sherri Bender is a 85 year old female with hx dementia who presents with progressive confusion and increased caretaker needs over past 1-2 months     Dementia - progression to the point of family feeling like she needs placement. Some behavior problems gwen at night. Will try risperdal.Will ask soc service to see, OT/PT. Cont aricept. Will hold ativan, unisom, claritin  as may be making her more confused    Fall- presumed mechanical, based on past hx of witnessed fall when attempting to walk unassisted- abrasions to face ( right cheek) and bilat knee- tylenol and ice prn.ck pending    HTN- cont cozaar but stop hctz for now    Depression- cont zoloft    Urinary incontinence- will hold hctz as this could be making it worse    Chronic rhinnorhea, hx environmental alllergies- has been on claritin. Will hold for now in case claritin increasing confusion    FEN- has had poor po intakeper daughter- will start ensure with meals. Has been on one ensure a day at home     Discussion- needs placement, will continue respirdal and off ativan

## 2021-08-04 NOTE — PROGRESS NOTES
[facility]  Chief Complaint   Patient presents with     [facility]   [place] MRN: [id number] Place of Service where encounter took place:  [facility] () [[id number]] HPI: Sherri Bender  is 89 year old (6/6/1932), who is being seen today for a federally mandated E/M visit.  HPI information obtained from: facility chart records, facility staff, patient report, [facility] chart review, and Care Everywhere [facility] chart review. Today's concerns are:    Sherri seen today per federal mandate.  In-house pharmacist also recommending reduction or discontinuation fully in trazodone.  Of note, Sherri is a hospice patient.    Today, Sherri is alert to voice, and attempts to answer questions with unintelligible language.  She does not appear in pain or nervous during exam, but attempts to participate.  She is otherwise unable to contribute to HPI.    ALLERGIES:Patient has no known allergies.PAST MEDICAL HISTORY:   has a past medical history of Dementia (H), Depression, Environmental allergies, Family history of ischemic heart disease (2/9/2010), HTN (hypertension), and Osteoarthritis. PAST SURGICAL HISTORY:   has a past surgical history that includes appendectomy; surgical history of - ; surgical history of - ; tonsillectomy; cataract iol, rt/lt (4/2010); and Colonoscopy (6/20/2011).FAMILY HISTORY: family history includes Allergies in her brother; Cancer in her brother and mother; Heart Disease in her father and mother.SOCIAL HISTORY:  reports that she has quit smoking. She has never used smokeless tobacco. She reports that she does not drink alcohol and does not use drugs.  MEDICATIONS:  Current Outpatient Medications   Medication Sig Dispense Refill     guaiFENesin (ROBITUSSIN) 100 MG/5ML SYRP Take 20 mLs by mouth every 4 hours as needed for cough       LORazepam (ATIVAN) 2 MG/ML (HIGH CONC) solution Take 0.25 mLs (0.5 mg) by mouth At Bedtime 30 mL 3     morphine sulfate 20 MG/5ML SOLN  "Take 0.25 mLs by mouth 2 times daily Take 0.25 mL by mouth every 1 hour as needed       polyethylene glycol (MIRALAX/GLYCOLAX) Packet Take 17 g by mouth daily       senna-docusate (SENOKOT-S;PERICOLACE) 8.6-50 MG per tablet Take 1 tablet by mouth 2 times daily       Sertraline HCl (ZOLOFT PO) Take 25 mg by mouth daily       TRAZODONE HCL PO Take 25 mg by mouth nightly as needed       Case Management:  I have reviewed the care plan and MDS and do agree with the plan. Patient's desire to return to the community is not assessible due to cognitive impairment. Information reviewed:  Medications, vital signs, orders, and nursing notes.    ROS: Unobtainable secondary to cognitive impairment.     Vitals: /72   Pulse 89   Temp 97.4  F (36.3  C)   Resp 12   Ht 1.6 m (5' 3\")   Wt 64.6 kg (142 lb 8 oz)   SpO2 96%   BMI 25.24 kg/m    Exam:  GENERAL APPEARANCE: Alert, in no distress, cooperative.   ENT: Mouth/posterior oropharynx intact w/ moist mucous membranes, hearing acuity [place].  EYES: EOM, conjunctivae, lids, pupils and irises normal, PERRL2.   RESP: Respiratory effort unlabored, no respiratory distress, Lung sounds clear. On RA.   CV: Auscultation of heart reveals S1, S2, rate and rhythm regular, no murmur, no rub or gallop, Edema 0+ BLE. Peripheral pulses are 2+.  ABDOMEN: Normal bowel sounds, soft, non-tender abdomen, and no masses palpated.  SKIN: Inspection/Palpation of skin and subcutaneous tissue baseline w/ fragility. No wounds/rashes noted.   NEURO: CN II-XII at patient's baseline, sensation baseline PPS.  PSYCH: Insight, judgement, and memory are baseline impaired, affect and mood are confused/cooperative.    Lab/Diagnostic data:   Recent labs in [facility] reviewed by me today.     ASSESSMENT/PLAN  Late onset Alzheimer's disease without behavioral disturbance (H)  Paranoia (H)  Mild major depression (H)  Hospice [facility]. Ongoing.    We will discontinue current trazodone orders.    Noting " hospice status, history of depression, paranoia, significantly advanced dementia, and that Sherri's goal is to be comfortable.  Therefore, we will instead change trazodone to PRN, in case this needs to be available for comfort. Continuation of PRN order for non-antipsychotic psychotropic medication, is appropriate due to occasional insomnia and end-stage palliative care goals and is being reordered today with an end date in 14 days.     Will otherwise continue to monitor.  Follow-up routinely or as needed.    Orders:  1. Discontinue Trazodone.  2. Trazodone 25mg PO at bedtime PRN x 14 days. Dx: Insomnia.     Electronically signed by:  Dr. Genia Titus, APRN CNP DNP

## 2021-08-05 ENCOUNTER — NURSING HOME VISIT (OUTPATIENT)
Dept: GERIATRICS | Facility: CLINIC | Age: 86
End: 2021-08-05
Payer: COMMERCIAL

## 2021-08-05 VITALS
TEMPERATURE: 97.4 F | BODY MASS INDEX: 25.25 KG/M2 | WEIGHT: 142.5 LBS | SYSTOLIC BLOOD PRESSURE: 119 MMHG | DIASTOLIC BLOOD PRESSURE: 72 MMHG | RESPIRATION RATE: 12 BRPM | HEART RATE: 89 BPM | HEIGHT: 63 IN | OXYGEN SATURATION: 96 %

## 2021-08-05 DIAGNOSIS — F02.80 LATE ONSET ALZHEIMER'S DISEASE WITHOUT BEHAVIORAL DISTURBANCE (H): Primary | ICD-10-CM

## 2021-08-05 DIAGNOSIS — Z51.5 HOSPICE CARE: ICD-10-CM

## 2021-08-05 DIAGNOSIS — G30.1 LATE ONSET ALZHEIMER'S DISEASE WITHOUT BEHAVIORAL DISTURBANCE (H): Primary | ICD-10-CM

## 2021-08-05 DIAGNOSIS — F32.0 MILD MAJOR DEPRESSION (H): ICD-10-CM

## 2021-08-05 DIAGNOSIS — F22 PARANOIA (H): ICD-10-CM

## 2021-08-05 PROCEDURE — 99309 SBSQ NF CARE MODERATE MDM 30: CPT | Mod: GV | Performed by: NURSE PRACTITIONER

## 2021-08-05 ASSESSMENT — MIFFLIN-ST. JEOR: SCORE: 1040.51

## 2021-10-06 ENCOUNTER — NURSING HOME VISIT (OUTPATIENT)
Dept: GERIATRICS | Facility: CLINIC | Age: 86
End: 2021-10-06
Payer: COMMERCIAL

## 2021-10-06 VITALS
HEIGHT: 63 IN | WEIGHT: 147.8 LBS | TEMPERATURE: 96.5 F | BODY MASS INDEX: 26.19 KG/M2 | RESPIRATION RATE: 17 BRPM | OXYGEN SATURATION: 94 % | HEART RATE: 89 BPM | SYSTOLIC BLOOD PRESSURE: 124 MMHG | DIASTOLIC BLOOD PRESSURE: 68 MMHG

## 2021-10-06 DIAGNOSIS — G30.1 LATE ONSET ALZHEIMER'S DISEASE WITHOUT BEHAVIORAL DISTURBANCE (H): Primary | ICD-10-CM

## 2021-10-06 DIAGNOSIS — H91.93 BILATERAL HEARING LOSS, UNSPECIFIED HEARING LOSS TYPE: ICD-10-CM

## 2021-10-06 DIAGNOSIS — F02.80 LATE ONSET ALZHEIMER'S DISEASE WITHOUT BEHAVIORAL DISTURBANCE (H): Primary | ICD-10-CM

## 2021-10-06 DIAGNOSIS — I50.22 CHRONIC SYSTOLIC CONGESTIVE HEART FAILURE (H): ICD-10-CM

## 2021-10-06 DIAGNOSIS — S80.821A BLISTER OF RIGHT LOWER EXTREMITY, INITIAL ENCOUNTER: ICD-10-CM

## 2021-10-06 PROCEDURE — 99309 SBSQ NF CARE MODERATE MDM 30: CPT | Mod: GW | Performed by: NURSE PRACTITIONER

## 2021-10-06 ASSESSMENT — MIFFLIN-ST. JEOR: SCORE: 1064.55

## 2021-10-06 NOTE — PROGRESS NOTES
"[facility]  Episodic/Acute/Follow-Up  [place] MRN: [id number]. Place of Service where encounter took place:  [facility] () [[id number]]   Chief Complaint   Patient presents with     RECHECK    HPI: Sherri Bender  is a 89 year old (6/6/1932), who is being seen today for an episodic care visit.  HPI information obtained from: facility chart records, facility staff, patient report and [facility] chart review. Today's concern is:    Sherri seen today after nursing requested review of right shin lesion. It was previously documented as fluid filled blister, and has been swabbed w/ betadine. Today, Sherri is nonverbal but makes eye contact and smiles. With lesion palpation and other exam, she is comfortable.    Past Medical and Surgical History reviewed in Epic today.  MEDICATIONS:  Current Outpatient Medications   Medication Sig Dispense Refill     guaiFENesin (ROBITUSSIN) 100 MG/5ML SYRP Take 20 mLs by mouth every 4 hours as needed for cough       LORazepam (ATIVAN) 2 MG/ML (HIGH CONC) solution Take 0.25 mLs (0.5 mg) by mouth At Bedtime 30 mL 3     morphine sulfate 20 MG/5ML SOLN Take 0.25 mLs by mouth 2 times daily Take 0.25 mL by mouth every 1 hour as needed       polyethylene glycol (MIRALAX/GLYCOLAX) Packet Take 17 g by mouth daily       senna-docusate (SENOKOT-S;PERICOLACE) 8.6-50 MG per tablet Take 1 tablet by mouth 2 times daily       Sertraline HCl (ZOLOFT PO) Take 25 mg by mouth daily       TRAZODONE HCL PO Take 25 mg by mouth nightly as needed       REVIEW OF SYSTEMS: Unobtainable secondary to cognitive impairment and Unobtainable secondary to aphasia.     Objective: /68   Pulse 89   Temp (!) 96.5  F (35.8  C)   Resp 17   Ht 1.6 m (5' 3\")   Wt 67 kg (147 lb 12.8 oz)   SpO2 94%   BMI 26.18 kg/m    Exam:  GENERAL APPEARANCE: Alert, in no distress, cooperative.   ENT: Mouth/posterior oropharynx intact w/ moist mucous membranes, hearing acuity [place].  EYES: EOM, " conjunctivae, lids, pupils and irises normal, PERRL2.   RESP: Respiratory effort unlabored, no respiratory distress, Lung sounds clear/diminished. On RA.   CV: Auscultation of heart reveals S1, S2, rate and rhythm regular, no murmur, no rub or gallop, Edema 0+ BLE. Peripheral pulses are 2+.  ABDOMEN: Normal bowel sounds, soft, non-tender abdomen, and no masses palpated.  SKIN: Inspection/Palpation of skin and subcutaneous tissue baseline w/ fragility. No wounds/rashes noted, except old blister as noted here:    NEURO: CN II-XII at patient's baseline, sensation baseline PPS.  PSYCH: Insight, judgement, and memory are impaired at baseline, affect and mood are unknown.    Labs: Labs done in facility are in EPIC. Please refer to them using [employer] Everywhere.    ASSESSMENT/PLAN:  Late onset Alzheimer's disease without behavioral disturbance (H)  Bilateral hearing loss, unspecified hearing loss type  Chronic systolic congestive heart failure (H)  Blister of right lower extremity, initial encounter  Acute on chronic. Ongoing.     Will discontinue betadine swab as this appears as closed and healing/reabsorbed.     Cannot rule out CHF and weeping, nor trauma causing blister to the area.     Sherri unable to fully participate in HPI/ROS today, but this could due to both Alzheimer's and [place].     Will instead protect skin and normalize color in this area with skin prep QDay. Otherwise, lesion appears to be healing.   Follow up routinely or as needed.    Orders:  1. Discontinue Betadine swab to right shin.  2. Skin prep to right shin, QAM. Dx: old blister.     Electronically signed by:  Dr. Genia Titus, APRN CNP DNP

## 2021-10-06 NOTE — LETTER
"    10/6/2021        RE: Sherri Bender  [facility]  [address] [address] [facility] [address]        [facility] [place] [facility]  Episodic/Acute/Follow-Up  [place] MRN: [id number]. [facility] [[id number]   Chief Complaint   Patient presents with     RECHECK    HPI: Sherri Bender  is a 89 year old (6/6/1932), who is being seen today for an episodic care visit.  HPI information obtained from: facility chart records, facility staff, patient report and [facility] chart review. Today's concern is:    Sherri seen today after nursing requested review of right shin lesion. It was previously documented as fluid filled blister, and has been swabbed w/ betadine. Today, Sherri is nonverbal but makes eye contact and smiles. With lesion palpation and other exam, she is comfortable.    Past Medical and Surgical History reviewed in Epic today.  MEDICATIONS:  Current Outpatient Medications   Medication Sig Dispense Refill     guaiFENesin (ROBITUSSIN) 100 MG/5ML SYRP Take 20 mLs by mouth every 4 hours as needed for cough       LORazepam (ATIVAN) 2 MG/ML (HIGH CONC) solution Take 0.25 mLs (0.5 mg) by mouth At Bedtime 30 mL 3     morphine sulfate 20 MG/5ML SOLN Take 0.25 mLs by mouth 2 times daily Take 0.25 mL by mouth every 1 hour as needed       polyethylene glycol (MIRALAX/GLYCOLAX) Packet Take 17 g by mouth daily       senna-docusate (SENOKOT-S;PERICOLACE) 8.6-50 MG per tablet Take 1 tablet by mouth 2 times daily       Sertraline HCl (ZOLOFT PO) Take 25 mg by mouth daily       TRAZODONE HCL PO Take 25 mg by mouth nightly as needed       REVIEW OF SYSTEMS: Unobtainable secondary to cognitive impairment and Unobtainable secondary to aphasia.     Objective: /68   Pulse 89   Temp (!) 96.5  F (35.8  C)   Resp 17   Ht 1.6 m (5' 3\")   Wt 67 kg (147 lb 12.8 oz)   SpO2 94%   BMI 26.18 kg/m    Exam:  GENERAL APPEARANCE: Alert, in no " distress, cooperative.   ENT: Mouth/posterior oropharynx intact w/ moist mucous membranes, hearing acuity [place].  EYES: EOM, conjunctivae, lids, pupils and irises normal, PERRL2.   RESP: Respiratory effort unlabored, no respiratory distress, Lung sounds clear/diminished. On RA.   CV: Auscultation of heart reveals S1, S2, rate and rhythm regular, no murmur, no rub or gallop, Edema 0+ BLE. Peripheral pulses are 2+.  ABDOMEN: Normal bowel sounds, soft, non-tender abdomen, and no masses palpated.  SKIN: Inspection/Palpation of skin and subcutaneous tissue baseline w/ fragility. No wounds/rashes noted, except old blister as noted here:    NEURO: CN II-XII at patient's baseline, sensation baseline PPS.  PSYCH: Insight, judgement, and memory are impaired at baseline, affect and mood are unknown.    Labs: Labs done in facility are in EPIC. Please refer to them using [employer]/Care Everywhere.    ASSESSMENT/PLAN:  Late onset Alzheimer's disease without behavioral disturbance (H)  Bilateral hearing loss, unspecified hearing loss type  Chronic systolic congestive heart failure (H)  Blister of right lower extremity, initial encounter  Acute on chronic. Ongoing.     Will discontinue betadine swab as this appears as closed and healing/reabsorbed.     Cannot rule out CHF and weeping, nor trauma causing blister to the area.     Sherri unable to fully participate in HPI/ROS today, but this could due to both Alzheimer's and [place].     Will instead protect skin and normalize color in this area with skin prep QDay. Otherwise, lesion appears to be healing.   Follow up routinely or as needed.    Orders:  1. Discontinue Betadine swab to right shin.  2. Skin prep to right shin, QAM. Dx: old blister.     Electronically signed by:  VALERIE Solano CNP [facility]          Sincerely,        VALERIE Howard CNP

## 2021-10-11 ENCOUNTER — NURSING HOME VISIT (OUTPATIENT)
Dept: GERIATRICS | Facility: CLINIC | Age: 86
End: 2021-10-11
Payer: COMMERCIAL

## 2021-10-11 VITALS
BODY MASS INDEX: 26.06 KG/M2 | TEMPERATURE: 97.6 F | HEART RATE: 90 BPM | DIASTOLIC BLOOD PRESSURE: 72 MMHG | RESPIRATION RATE: 20 BRPM | SYSTOLIC BLOOD PRESSURE: 114 MMHG | OXYGEN SATURATION: 93 % | HEIGHT: 63 IN | WEIGHT: 147.1 LBS

## 2021-10-11 DIAGNOSIS — F02.80 LATE ONSET ALZHEIMER'S DISEASE WITHOUT BEHAVIORAL DISTURBANCE (H): ICD-10-CM

## 2021-10-11 DIAGNOSIS — I10 ESSENTIAL HYPERTENSION: ICD-10-CM

## 2021-10-11 DIAGNOSIS — Z51.5 HOSPICE CARE: ICD-10-CM

## 2021-10-11 DIAGNOSIS — F32.0 MILD MAJOR DEPRESSION (H): ICD-10-CM

## 2021-10-11 DIAGNOSIS — F22 PARANOIA (H): ICD-10-CM

## 2021-10-11 DIAGNOSIS — I50.22 CHRONIC SYSTOLIC CONGESTIVE HEART FAILURE (H): Primary | ICD-10-CM

## 2021-10-11 DIAGNOSIS — G30.1 LATE ONSET ALZHEIMER'S DISEASE WITHOUT BEHAVIORAL DISTURBANCE (H): ICD-10-CM

## 2021-10-11 PROCEDURE — 99309 SBSQ NF CARE MODERATE MDM 30: CPT | Performed by: FAMILY MEDICINE

## 2021-10-11 PROCEDURE — 99207 PR CDG-CUT & PASTE-POTENTIAL IMPACT ON LEVEL: CPT | Performed by: FAMILY MEDICINE

## 2021-10-11 ASSESSMENT — MIFFLIN-ST. JEOR: SCORE: 1061.37

## 2021-10-11 NOTE — PROGRESS NOTES
"[place] G[facility]IATRIC SERVICES  Chief Complaint   Patient presents with     [facility]     [place] Medical Record Number:  [id number]  Place of Service where encounter took place:  [facility] () [[id number]]    HPI:    Sherri Bender  is 89 year old (6/6/1932), who is being seen today for a federally mandated E/M visit.  HPI information obtained from: facility staff and [facility] chart review.     Today's concerns are:   - Resident seen and examined.  RN and GNP have no concern. Resident progressively declining, continued to be enrolled in hospice care.   --------------------------------  - - Past Medical, social, family histories, medications, and allergies reviewed and updated  - Medications reviewed: in the chart and EHR.   - Case Management:   I have reviewed the care plan and MDS and do agree with the plan. Patient's desire to return to the community is not present.  Information reviewed:  Medications, vital signs, orders, and nursing notes.    MEDICATIONS:  Current Outpatient Medications   Medication Sig Dispense Refill     guaiFENesin (ROBITUSSIN) 100 MG/5ML SYRP Take 20 mLs by mouth every 4 hours as needed for cough       LORazepam (ATIVAN) 2 MG/ML (HIGH CONC) solution Take 0.25 mLs (0.5 mg) by mouth At Bedtime 30 mL 3     morphine sulfate 20 MG/5ML SOLN Take 0.25 mLs by mouth 2 times daily Take 0.25 mL by mouth every 1 hour as needed       polyethylene glycol (MIRALAX/GLYCOLAX) Packet Take 17 g by mouth daily       senna-docusate (SENOKOT-S;PERICOLACE) 8.6-50 MG per tablet Take 1 tablet by mouth 2 times daily       Sertraline HCl (ZOLOFT PO) Take 25 mg by mouth daily       TRAZODONE HCL PO Take 25 mg by mouth nightly as needed       ROS: Unobtainable secondary to cognitive impairment.     Vitals:  /72   Pulse 90   Temp 97.6  F (36.4  C)   Resp 20   Ht 1.6 m (5' 3\")   Wt 66.7 kg (147 lb 1.6 oz)   SpO2 93%   BMI 26.06 kg/m    Body mass index is 26.06 " kg/m .  Exam:  GENERAL APPEARANCE:  Laying in bed.   RESP:  lungs clear to auscultation   CV:  S1S2 audible, regular HR, no murmur appreciated.   ABDOMEN:  soft, NT/ND, BS audible. no mass appreciated on palpation.   NEURO:   No involuntary movements  Psych: anxious, non verbal.     Lab/Diagnostic data: no new data to review.       ASSESSMENT/PLAN  ---------------------------  Systolic congestive heart failure, unspecified congestive heart failure chronicity (H)  HTN, essential  - compensated. No concern. Not on meds.       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 Chronic condition, ongoing decline expected.   -  Continue to provide redirection and reassurance as needed. Maintain safe living situation with goals focused on comfort.  - progressively declining.   -Appreciate collaboration with  hospice team for symptom management in collaboration with cares for maximum comfort at end-of-life      Order: no new order      Electronically signed by:  Aquiles Hernandez MD

## 2021-10-11 NOTE — LETTER
10/11/2021        RE: Sher[facility]ender  [facility]  [address] [address] [facility] [address]        [place] [facility]  Chief Complaint   Patient presents with     [facility]     [place] Medical Record Number:  [id number]  Place of Service where encounter took place:  [facility] () [[id number]]    HPI:    Sherri Bender  is 89 year old (6/6/1932), who is being seen today for a federally mandated E/M visit.  HPI information obtained from: facility staff and [facility] chart review.     Today's concerns are:   - Resident seen and examined.  RN and GNP have no concern. Resident progressively declining, continued to be enrolled in hospice care.   --------------------------------  - - Past Medical, social, family histories, medications, and allergies reviewed and updated  - Medications reviewed: in the chart and EHR.   - Case Management:   I have reviewed the care plan and MDS and do agree with the plan. Patient's desire to return to the community is not present.  Information reviewed:  Medications, vital signs, orders, and nursing notes.    MEDICATIONS:  Current Outpatient Medications   Medication Sig Dispense Refill     guaiFENesin (ROBITUSSIN) 100 MG/5ML SYRP Take 20 mLs by mouth every 4 hours as needed for cough       LORazepam (ATIVAN) 2 MG/ML (HIGH CONC) solution Take 0.25 mLs (0.5 mg) by mouth At Bedtime 30 mL 3     morphine sulfate 20 MG/5ML SOLN Take 0.25 mLs by mouth 2 times daily Take 0.25 mL by mouth every 1 hour as needed       polyethylene glycol (MIRALAX/GLYCOLAX) Packet Take 17 g by mouth daily       senna-docusate (SENOKOT-S;PERICOLACE) 8.6-50 MG per tablet Take 1 tablet by mouth 2 times daily       Sertraline HCl (ZOLOFT PO) Take 25 mg by mouth daily       TRAZODONE HCL PO Take 25 mg by mouth nightly as needed       ROS: Unobtainable secondary to cognitive impairment.     Vitals:  /72   Pulse 90   Temp 97.6  F (36.4  C)   Resp 20    "Ht 1.6 m (5' 3\")   Wt 66.7 kg (147 lb 1.6 oz)   SpO2 93%   BMI 26.06 kg/m    Body mass index is 26.06 kg/m .  Exam:  GENERAL APPEARANCE:  Laying in bed.   RESP:  lungs clear to auscultation   CV:  S1S2 audible, regular HR, no murmur appreciated.   ABDOMEN:  soft, NT/ND, BS audible. no mass appreciated on palpation.   NEURO:   No involuntary movements  Psych: anxious, non verbal.     Lab/Diagnostic data: no new data to review.       ASSESSMENT/PLAN  ---------------------------  Systolic congestive heart failure, unspecified congestive heart failure chronicity (H)  HTN, essential  - compensated. No concern. Not on meds.       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 Chronic condition, ongoing decline expected.   -  Continue to provide redirection and reassurance as needed. Maintain safe living situation with goals focused on comfort.  - progressively declining.   -Appreciate collaboration with  hospice team for symptom management in collaboration with cares for maximum comfort at end-of-life      Order: no new order      Electronically signed by:  Aquiles Hernandez MD        Sincerely,        Aquiles Hernandez MD    "

## 2021-11-08 ENCOUNTER — LAB REQUISITION (OUTPATIENT)
Dept: LAB | Facility: CLINIC | Age: 86
End: 2021-11-08
Payer: OTHER MISCELLANEOUS

## 2021-11-08 DIAGNOSIS — N18.9 CHRONIC KIDNEY DISEASE, UNSPECIFIED: ICD-10-CM

## 2021-11-08 LAB
ANION GAP SERPL CALCULATED.3IONS-SCNC: 5 MMOL/L (ref 3–14)
BUN SERPL-MCNC: 15 MG/DL (ref 7–30)
CALCIUM SERPL-MCNC: 9.2 MG/DL (ref 8.5–10.1)
CHLORIDE BLD-SCNC: 110 MMOL/L (ref 94–109)
CO2 SERPL-SCNC: 27 MMOL/L (ref 20–32)
CREAT SERPL-MCNC: 0.62 MG/DL (ref 0.52–1.04)
GFR SERPL CREATININE-BSD FRML MDRD: 80 ML/MIN/1.73M2
GLUCOSE BLD-MCNC: 77 MG/DL (ref 70–99)
POTASSIUM BLD-SCNC: 4.3 MMOL/L (ref 3.4–5.3)
SODIUM SERPL-SCNC: 142 MMOL/L (ref 133–144)

## 2021-11-08 PROCEDURE — P9603 ONE-WAY ALLOW PRORATED MILES: HCPCS | Mod: ORL | Performed by: FAMILY MEDICINE

## 2021-11-08 PROCEDURE — 36415 COLL VENOUS BLD VENIPUNCTURE: CPT | Mod: ORL | Performed by: FAMILY MEDICINE

## 2021-11-08 PROCEDURE — 80048 BASIC METABOLIC PNL TOTAL CA: CPT | Mod: ORL | Performed by: FAMILY MEDICINE

## 2021-12-16 ENCOUNTER — NURSING HOME VISIT (OUTPATIENT)
Dept: GERIATRICS | Facility: CLINIC | Age: 86
End: 2021-12-16
Payer: COMMERCIAL

## 2021-12-16 VITALS
DIASTOLIC BLOOD PRESSURE: 65 MMHG | HEART RATE: 88 BPM | WEIGHT: 148 LBS | TEMPERATURE: 98.9 F | SYSTOLIC BLOOD PRESSURE: 121 MMHG | RESPIRATION RATE: 20 BRPM | HEIGHT: 63 IN | BODY MASS INDEX: 26.22 KG/M2 | OXYGEN SATURATION: 98 %

## 2021-12-16 DIAGNOSIS — F32.0 MILD MAJOR DEPRESSION (H): ICD-10-CM

## 2021-12-16 DIAGNOSIS — Z51.5 HOSPICE CARE: ICD-10-CM

## 2021-12-16 DIAGNOSIS — F02.80 LATE ONSET ALZHEIMER'S DISEASE WITHOUT BEHAVIORAL DISTURBANCE (H): ICD-10-CM

## 2021-12-16 DIAGNOSIS — Z00.00 ANNUAL PHYSICAL EXAM: Primary | ICD-10-CM

## 2021-12-16 DIAGNOSIS — I50.22 CHRONIC SYSTOLIC CONGESTIVE HEART FAILURE (H): ICD-10-CM

## 2021-12-16 DIAGNOSIS — G30.1 LATE ONSET ALZHEIMER'S DISEASE WITHOUT BEHAVIORAL DISTURBANCE (H): ICD-10-CM

## 2021-12-16 DIAGNOSIS — I10 ESSENTIAL HYPERTENSION: ICD-10-CM

## 2021-12-16 PROCEDURE — 99318 PR ANNUAL NURSING FAC ASSESSMNT, STABLE: CPT | Performed by: NURSE PRACTITIONER

## 2021-12-16 ASSESSMENT — MIFFLIN-ST. JEOR: SCORE: 1065.45

## 2021-12-16 NOTE — PROGRESS NOTES
[facility] Annual Physical  Chief Complaint   Patient presents with     Annual Comprehensive Nursing Home    [place] MRN: [id number] Place of Service where encounter took place:  [facility] ON [facility] () [[id number]] HPI: Sherri Bender  is a 89 year old  (6/6/1932), who is being seen today for an annual comprehensive visit. HPI information obtained from: facility chart records, facility staff, patient report and [facility] chart review.  Today's concerns are:    Sherri seen today for annual physical exam.  She is under hospice services through [facility] for advanced end-stage Alzheimer's disease.  Sherri is nonverbal, but can make eye contact and smile with conversation.  She is unable to contribute to HPI.    BIMS: 0/15    ALLERGIES: Patient has no known allergies.PAST MEDICAL HISTORY:  has a past medical history of Dementia (H), Depression, Environmental allergies, Family history of ischemic heart disease (2/9/2010), HTN (hypertension), and Osteoarthritis.PAST SURGICAL HISTORY:  has a past surgical history that includes appendectomy; surgical history of - ; surgical history of - ; tonsillectomy; cataract iol, rt/lt (4/2010); and Colonoscopy (6/20/2011).  IMMUNIZATIONS:  Immunization History   Administered Date(s) Administered     COVID-19,PF,Moderna 01/20/2021, 01/28/2021     FLU 6-35 months 12/09/2009, 10/25/2013     Influenza (High Dose) 3 valent vaccine 10/17/2011, 10/15/2015, 10/22/2015, 10/16/2019, 10/02/2020     Influenza (IIV3) PF 11/13/2007, 10/28/2008, 12/09/2009, 11/01/2010, 10/17/2011, 10/26/2012, 10/01/2013, 10/16/2019     Influenza Vaccine, 6+MO IM (QUADRIVALENT W/PRESERVATIVES) 10/23/2014     Pneumo Conj 13-V (2010&after) 10/27/2015     Pneumococcal 23 valent 12/20/2001, 11/21/2009     TD (ADULT, 7+) 11/20/2009     TDAP Vaccine (Adacel) 01/01/1990     Zoster vaccine, live 06/16/2014    Above immunizations pulled from [facility]. MIIC and facility records also reconciled. Outstanding  information sent to  to update [facility].  Future immunizations are not needed at this point as all recommended immunizations are up to date. w/ exception of COVID booster.  Current Outpatient Medications   Medication Sig Dispense Refill     guaiFENesin (ROBITUSSIN) 100 MG/5ML SYRP Take 20 mLs by mouth every 4 hours as needed for cough       LORazepam (ATIVAN) 2 MG/ML (HIGH CONC) solution Take 0.25 mLs (0.5 mg) by mouth At Bedtime 30 mL 3     morphine sulfate 20 MG/5ML SOLN Take 0.25 mLs by mouth 2 times daily Take 0.25 mL by mouth every 1 hour as needed       polyethylene glycol (MIRALAX/GLYCOLAX) Packet Take 17 g by mouth daily       senna-docusate (SENOKOT-S;PERICOLACE) 8.6-50 MG per tablet Take 1 tablet by mouth 2 times daily       Sertraline HCl (ZOLOFT PO) Take 25 mg by mouth daily       TRAZODONE HCL PO Take 25 mg by mouth nightly as needed       Case Management: I have reviewed the facility/SNF care plan/MDS, including the falls risk, nutrition and pain screening. I also reviewed the current immunizations, and preventive care. .Future cancer screening is not clinically indicated secondary to age/goals of care Patient's desire to return to the community is not assessible due to cognitive impairment. Current Level of Care is appropriate.    Advance Directive Discussion: I reviewed the current advanced directives as reflected in EPIC, the POLST and the facility chart, and verified the congruency of orders. I contacted hospice services and discussed the plan of Care.  I did not due to cognitive impairment review the advance directives with the resident.     Team Discussion: I communicated with the appropriate disciplines involved with the Plan of Care: Nursing   and Hospice.Patient's goal is pain control and comfort.Information reviewed: Medications, vital signs, orders, and nursing notes.    ROS: Unobtainable secondary to cognitive impairment and secondary to aphasia.     Vitals: /65   " Pulse 88   Temp 98.9  F (37.2  C)   Resp 20   Ht 1.6 m (5' 3\")   Wt 67.1 kg (148 lb)   SpO2 98%   BMI 26.22 kg/m     BPs:    Exam:  GENERAL APPEARANCE: Alert, in no distress, cooperative.   ENT: Mouth/posterior oropharynx intact w/ moist mucous membranes, hearing acuity [place].  EYES: EOM, conjunctivae, lids, pupils and irises normal, PERRL2.   RESP: Respiratory effort fair, no respiratory distress, Lung sounds clear. On RA.   CV: Auscultation of heart reveals S1, S2, rate and rhythm regular, no murmur, no rub or gallop, Edema 0+ BLE. Peripheral pulses are 2+.  ABDOMEN: Normal bowel sounds, soft, non-tender abdomen, and no masses palpated.  SKIN: Inspection/Palpation of skin and subcutaneous tissue baseline w/ fragility. No wounds/rashes noted.   NEURO: CN II-XII at patient's baseline, sensation baseline unknown.  PSYCH: Insight, judgement, and memory are impaired at baseline, affect and mood are withdrawn/flat.    Lab/Diagnostic data: Recent labs in [employer] reviewed by me today.     ASSESSMENT/PLAN  Annual physical exam  Chronic systolic congestive heart failure (H)  Late onset Alzheimer's disease without behavioral disturbance (H)  Mild major depression (H)  Essential hypertension  Hospice care  Chronic. Ongoing.    Given goals of care and to maintain comfort, we will not pursue routine cancer screenings or routine blood work.    Sherri is underweight, and this is likely secondary to her dementia.    Noting CHF and hypertension are overall controlled with current plan of care.  Sherri very occasionally uses as needed lorazepam as part of her hospice comfort measures.  Will renew this order. Continuation of PRN order for non-antipsychotic psychotropic medication, is appropriate due to sporadic anxiety and end-stage palliative care goals and is being reordered today with an end date in 30 days.      Provider briefly coordinated care with hospice services, reviewed vital signs with them, and CODE STATUS is as noted " DNR/DNI. Hospice in contact w/ family.   Follow up routinely or as needed.     Orders:  1. RENEW PRN Lorazepam x 30 days. Dx: [place].     Electronically signed by:  Dr. Genia Titus, APRN CNP DNP

## 2021-12-16 NOTE — LETTER
12/16/2021        RE: Sherri Bender  [place]  [address] [facility] [address]        [facility] Annual Physical  Chief Complaint   Patient presents with     Annual Comprehensive Nursing Home    [place] MRN: [id number] Place of Service where encounter took place:  [facility] ON [facility] () [[id number]] HPI: Sherri Bender  is a 89 year old  (6/6/1932), who is being seen today for an annual comprehensive visit. HPI information obtained from: facility chart records, facility staff, patient report and [facility] chart review.  Today's concerns are:    Sherri seen today for annual physical exam.  She is under hospice services through [facility] for advanced end-stage Alzheimer's disease.  Sherri is nonverbal, but can make eye contact and smile with conversation.  She is unable to contribute to HPI.    BIMS: 0/15    ALLERGIES: Patient has no known allergies.PAST MEDICAL HISTORY:  has a past medical history of Dementia (H), Depression, Environmental allergies, Family history of ischemic heart disease (2/9/2010), HTN (hypertension), and Osteoarthritis.PAST SURGICAL HISTORY:  has a past surgical history that includes appendectomy; surgical history of - ; surgical history of - ; tonsillectomy; cataract iol, rt/lt (4/2010); and Colonoscopy (6/20/2011).  IMMUNIZATIONS:  Immunization History   Administered Date(s) Administered     COVID-19,PF,Moderna 01/20/2021, 01/28/2021     FLU 6-35 months 12/09/2009, 10/25/2013     Influenza (High Dose) 3 valent vaccine 10/17/2011, 10/15/2015, 10/22/2015, 10/16/2019, 10/02/2020     Influenza (IIV3) PF 11/13/2007, 10/28/2008, 12/09/2009, 11/01/2010, 10/17/2011, 10/26/2012, 10/01/2013, 10/16/2019     Influenza Vaccine, 6+MO IM (QUADRIVALENT W/PRESERVATIVES) 10/23/2014     Pneumo Conj 13-V (2010&after) 10/27/2015     Pneumococcal 23 valent 12/20/2001, 11/21/2009     TD (ADULT, 7+) 11/20/2009     TDAP Vaccine (Adacel) 01/01/1990     Zoster vaccine, live  06/16/2014    Above immunizations pulled from [facility]. MIIC and facility records also reconciled. Outstanding information sent to  to update [facility].  Future immunizations are not needed at this point as all recommended immunizations are up to date. w/ exception of COVID booster.  Current Outpatient Medications   Medication Sig Dispense Refill     guaiFENesin (ROBITUSSIN) 100 MG/5ML SYRP Take 20 mLs by mouth every 4 hours as needed for cough       LORazepam (ATIVAN) 2 MG/ML (HIGH CONC) solution Take 0.25 mLs (0.5 mg) by mouth At Bedtime 30 mL 3     morphine sulfate 20 MG/5ML SOLN Take 0.25 mLs by mouth 2 times daily Take 0.25 mL by mouth every 1 hour as needed       polyethylene glycol (MIRALAX/GLYCOLAX) Packet Take 17 g by mouth daily       senna-docusate (SENOKOT-S;PERICOLACE) 8.6-50 MG per tablet Take 1 tablet by mouth 2 times daily       Sertraline HCl (ZOLOFT PO) Take 25 mg by mouth daily       TRAZODONE HCL PO Take 25 mg by mouth nightly as needed       Case Management: I have reviewed the facility/SNF care plan/MDS, including the falls risk, nutrition and pain screening. I also reviewed the current immunizations, and preventive care. .Future cancer screening is not clinically indicated secondary to age/goals of care Patient's desire to return to the community is not assessible due to cognitive impairment. Current Level of Care is appropriate.    Advance Directive Discussion: I reviewed the current advanced directives as reflected in EPIC, the POLST and the facility chart, and verified the congruency of orders. I contacted hospice services and discussed the plan of Care.  I did not due to cognitive impairment review the advance directives with the resident.     Team Discussion: I communicated with the appropriate disciplines involved with the Plan of Care: Nursing   and Hospice.Patient's goal is pain control and comfort.Information reviewed: Medications, vital signs, orders, and  "nursing notes.    ROS: Unobtainable secondary to cognitive impairment and secondary to aphasia.     Vitals: /65   Pulse 88   Temp 98.9  F (37.2  C)   Resp 20   Ht 1.6 m (5' 3\")   Wt 67.1 kg (148 lb)   SpO2 98%   BMI 26.22 kg/m     BPs:    Exam:  GENERAL APPEARANCE: Alert, in no distress, cooperative.   ENT: Mouth/posterior oropharynx intact w/ moist mucous membranes, hearing acuity [place].  EYES: EOM, conjunctivae, lids, pupils and irises normal, PERRL2.   RESP: Respiratory effort fair, no respiratory distress, Lung sounds clear. On RA.   CV: Auscultation of heart reveals S1, S2, rate and rhythm regular, no murmur, no rub or gallop, Edema 0+ BLE. Peripheral pulses are 2+.  ABDOMEN: Normal bowel sounds, soft, non-tender abdomen, and no masses palpated.  SKIN: Inspection/Palpation of skin and subcutaneous tissue baseline w/ fragility. No wounds/rashes noted.   NEURO: CN II-XII at patient's baseline, sensation baseline unknown.  PSYCH: Insight, judgement, and memory are impaired at baseline, affect and mood are withdrawn/flat.    Lab/Diagnostic data: Recent labs in [facility] reviewed by me today.     ASSESSMENT/PLAN  Annual physical exam  Chronic systolic congestive heart failure (H)  Late onset Alzheimer's disease without behavioral disturbance (H)  Mild major depression (H)  Essential hypertension  Hospice care  Chronic. Ongoing.    Given goals of care and to maintain comfort, we will not pursue routine cancer screenings or routine blood work.    Sherri is underweight, and this is likely secondary to her dementia.    Noting CHF and hypertension are overall controlled with current plan of care.  Sherri very occasionally uses as needed lorazepam as part of her hospice comfort measures.  Will renew this order. Continuation of PRN order for non-antipsychotic psychotropic medication, is appropriate due to sporadic anxiety and end-stage palliative care goals and is being reordered today with an end date in 30 days.  "     Provider briefly coordinated care with hospice services, reviewed vital signs with them, and CODE STATUS is as noted DNR/DNI. Hospice in contact w/ family.   Follow up routinely or as needed.     Orders:  1. RENEW PRN Lorazepam x 30 days. Dx: [place].     Electronically signed by:  VALERIE Solano CNP DNP              Sincerely,        VALERIE Howard CNP

## 2022-01-01 ENCOUNTER — NURSING HOME VISIT (OUTPATIENT)
Dept: GERIATRICS | Facility: CLINIC | Age: 87
End: 2022-01-01
Payer: OTHER MISCELLANEOUS

## 2022-01-01 ENCOUNTER — PATIENT OUTREACH (OUTPATIENT)
Dept: GERIATRIC MEDICINE | Facility: CLINIC | Age: 87
End: 2022-01-01

## 2022-01-01 VITALS
HEART RATE: 83 BPM | SYSTOLIC BLOOD PRESSURE: 105 MMHG | HEIGHT: 63 IN | OXYGEN SATURATION: 94 % | BODY MASS INDEX: 22.68 KG/M2 | TEMPERATURE: 97.1 F | WEIGHT: 128 LBS | DIASTOLIC BLOOD PRESSURE: 65 MMHG | RESPIRATION RATE: 16 BRPM

## 2022-01-01 DIAGNOSIS — F32.0 MILD MAJOR DEPRESSION (H): ICD-10-CM

## 2022-01-01 DIAGNOSIS — G30.1 LATE ONSET ALZHEIMER'S DISEASE WITHOUT BEHAVIORAL DISTURBANCE (H): ICD-10-CM

## 2022-01-01 DIAGNOSIS — Z51.5 HOSPICE CARE: ICD-10-CM

## 2022-01-01 DIAGNOSIS — F02.80 LATE ONSET ALZHEIMER'S DISEASE WITHOUT BEHAVIORAL DISTURBANCE (H): ICD-10-CM

## 2022-01-01 DIAGNOSIS — R54 FRAIL ELDERLY: ICD-10-CM

## 2022-01-01 DIAGNOSIS — G30.8 OTHER ALZHEIMER'S DISEASE (CODE) (H): ICD-10-CM

## 2022-01-01 DIAGNOSIS — I10 ESSENTIAL HYPERTENSION: ICD-10-CM

## 2022-01-01 DIAGNOSIS — I50.22 CHRONIC SYSTOLIC CONGESTIVE HEART FAILURE (H): Primary | ICD-10-CM

## 2022-01-01 PROCEDURE — 99308 SBSQ NF CARE LOW MDM 20: CPT | Mod: GW | Performed by: FAMILY MEDICINE

## 2022-01-01 ASSESSMENT — ACTIVITIES OF DAILY LIVING (ADL)
DEPENDENT_IADLS:: CLEANING;COOKING;LAUNDRY;SHOPPING;MEAL PREPARATION;MEDICATION MANAGEMENT;MONEY MANAGEMENT;TRANSPORTATION;INCONTINENCE

## 2022-01-31 NOTE — PROGRESS NOTES
[facility] [place] [facility] Regulatory  Chief Complaint   Patient presents with     [facility] Care Coordination - Regulatory   [place] MRN: [id number] Place of Service where encounter took place:  [facility] () [[id number]] HPI: Sherri Bender  is 89 year old (6/6/1932), who is being seen today for a federally mandated E/M visit.  HPI information obtained from: facility chart records, facility staff, patient report, [facility] chart review, and Care Everywhere [facility] chart review. Today's concerns are:    Sherri seen today per federal mandate.  She is nonverbal and cannot contribute to HPI.  Nursing has no new concerns.    ALLERGIES:Patient has no known allergies.PAST MEDICAL HISTORY:   has a past medical history of Dementia (H), Depression, Environmental allergies, Family history of ischemic heart disease (2/9/2010), HTN (hypertension), and Osteoarthritis. PAST SURGICAL HISTORY:   has a past surgical history that includes appendectomy; surgical history of - ; surgical history of - ; tonsillectomy; cataract iol, rt/lt (4/2010); and Colonoscopy (6/20/2011).FAMILY HISTORY: family history includes Allergies in her brother; Cancer in her brother and mother; Heart Disease in her father and mother.SOCIAL HISTORY:  reports that she has quit smoking. She has never used smokeless tobacco. She reports that she does not drink alcohol and does not use drugs.  MEDICATIONS:  Current Outpatient Medications   Medication Sig Dispense Refill     guaiFENesin (ROBITUSSIN) 100 MG/5ML SYRP Take 20 mLs by mouth every 4 hours as needed for cough       LORazepam (ATIVAN) 2 MG/ML (HIGH CONC) solution Take 0.25 mLs (0.5 mg) by mouth At Bedtime 30 mL 3     morphine sulfate 20 MG/5ML SOLN Take 0.25 mLs by mouth 2 times daily Take 0.25 mL by mouth every 1 hour as needed       polyethylene glycol (MIRALAX/GLYCOLAX) Packet Take 17 g by mouth daily       senna-docusate (SENOKOT-S;PERICOLACE) 8.6-50 MG per tablet Take 1 tablet by mouth 2 times daily    "    Case Management:  I have reviewed the care plan and MDS and do agree with the plan. Patient's desire to return to the community is not assessible due to cognitive impairment. Information reviewed:  Medications, vital signs, orders, and nursing notes.    ROS: Unobtainable secondary to cognitive impairment.  and Unobtainable secondary to aphasia.     Vitals: BP (!) 124/90   Pulse 91   Temp 97  F (36.1  C)   Resp 18   Ht 1.6 m (5' 3\")   Wt 65.1 kg (143 lb 8 oz)   SpO2 93%   BMI 25.42 kg/m    Exam:  GENERAL APPEARANCE: Alert, in no distress, cooperative.   ENT: Mouth/posterior oropharynx intact w/ moist mucous membranes, hearing acuity [place].  EYES: EOM, conjunctivae, lids, pupils and irises normal, PERRL2.   RESP: Respiratory effort unlabored, no respiratory distress, Lung sounds clear. On RA.   CV: Auscultation of heart reveals S1, S2, rate and rhythm regular, no murmur, no rub or gallop, Edema 0+ BLE. Peripheral pulses are 2+.  ABDOMEN: Normal bowel sounds, soft, non-tender abdomen, and no masses palpated.  SKIN: Inspection/Palpation of skin and subcutaneous tissue baseline w/ fragility. No wounds/rashes noted.   NEURO: CN II-XII at patient's baseline, sensation unknown but appears at baseline.  PSYCH: Insight, judgement, and memory are impaired at baseline, affect and mood are happy/flat.    Lab/Diagnostic data:   Recent labs in EPIC reviewed by me today.     ASSESSMENT/PLAN  Other Alzheimer's disease (CODE) (H)  Mild major depression (H)  Chronic systolic congestive heart failure (H)  Frail elderly  Hospice care- [facility]  Acute on chronic. Ongoing.    Sherri appears comfortable today and continues with hospice services.  She was smiling and alert during our visit, but could not contribute to ROS/HPI    Her CHF appears controlled at baseline, with no edema or shortness of breath.    Unknown if depression is controlled but appears comfortable, Alzheimer's disease has advanced and Sherri is certainly appropriate " to continue with hospice services.  Follow up routinely or as needed.    Orders:  No new orders.    Electronically signed by:  VALERIE Solano CNP DNP

## 2022-02-01 ENCOUNTER — NURSING HOME VISIT (OUTPATIENT)
Dept: GERIATRICS | Facility: CLINIC | Age: 87
End: 2022-02-01
Payer: COMMERCIAL

## 2022-02-01 VITALS
HEART RATE: 91 BPM | RESPIRATION RATE: 18 BRPM | DIASTOLIC BLOOD PRESSURE: 90 MMHG | BODY MASS INDEX: 25.43 KG/M2 | TEMPERATURE: 97 F | HEIGHT: 63 IN | OXYGEN SATURATION: 93 % | WEIGHT: 143.5 LBS | SYSTOLIC BLOOD PRESSURE: 124 MMHG

## 2022-02-01 DIAGNOSIS — G30.8 OTHER ALZHEIMER'S DISEASE (CODE) (H): ICD-10-CM

## 2022-02-01 DIAGNOSIS — I50.22 CHRONIC SYSTOLIC CONGESTIVE HEART FAILURE (H): ICD-10-CM

## 2022-02-01 DIAGNOSIS — Z51.5 HOSPICE CARE: ICD-10-CM

## 2022-02-01 DIAGNOSIS — F32.0 MILD MAJOR DEPRESSION (H): ICD-10-CM

## 2022-02-01 DIAGNOSIS — R54 FRAIL ELDERLY: Primary | ICD-10-CM

## 2022-02-01 PROCEDURE — 99309 SBSQ NF CARE MODERATE MDM 30: CPT | Mod: GV | Performed by: NURSE PRACTITIONER

## 2022-02-01 ASSESSMENT — MIFFLIN-ST. JEOR: SCORE: 1045.04

## 2022-02-01 NOTE — LETTER
2/1/2022        RE: Sherri Bender  [place]  [address] [address] [address] [facility] [address]        [facility] Regulatory  Chief Complaint   Patient presents with     [facility] Care Coordination - Regulatory   [place] MRN: [id number] Place of Service where encounter took place:  [facility] () [[id number]] HPI: Sherri Bender  is 89 year old (6/6/1932), who is being seen today for a federally mandated E/M visit.  HPI information obtained from: facility chart records, facility staff, patient report, [facility] chart review, and Care Everywhere [facility] chart review. Today's concerns are:    Sherri seen today per federal mandate.  She is nonverbal and cannot contribute to HPI.  Nursing has no new concerns.    ALLERGIES:Patient has no known allergies.PAST MEDICAL HISTORY:   has a past medical history of Dementia (H), Depression, Environmental allergies, Family history of ischemic heart disease (2/9/2010), HTN (hypertension), and Osteoarthritis. PAST SURGICAL HISTORY:   has a past surgical history that includes appendectomy; surgical history of - ; surgical history of - ; tonsillectomy; cataract iol, rt/lt (4/2010); and Colonoscopy (6/20/2011).FAMILY HISTORY: family history includes Allergies in her brother; Cancer in her brother and mother; Heart Disease in her father and mother.SOCIAL HISTORY:  reports that she has quit smoking. She has never used smokeless tobacco. She reports that she does not drink alcohol and does not use drugs.  MEDICATIONS:  Current Outpatient Medications   Medication Sig Dispense Refill     guaiFENesin (ROBITUSSIN) 100 MG/5ML SYRP Take 20 mLs by mouth every 4 hours as needed for cough       LORazepam (ATIVAN) 2 MG/ML (HIGH CONC) solution Take 0.25 mLs (0.5 mg) by mouth At Bedtime 30 mL 3     morphine sulfate 20 MG/5ML SOLN Take 0.25 mLs by mouth 2 times daily Take 0.25 mL by mouth every 1 hour as needed       polyethylene glycol (MIRALAX/GLYCOLAX) Packet Take 17 g by  "mouth daily       senna-docusate (SENOKOT-S;PERICOLACE) 8.6-50 MG per tablet Take 1 tablet by mouth 2 times daily       Case Management:  I have reviewed the care plan and MDS and do agree with the plan. Patient's desire to return to the community is not assessible due to cognitive impairment. Information reviewed:  Medications, vital signs, orders, and nursing notes.    ROS: Unobtainable secondary to cognitive impairment.  and Unobtainable secondary to aphasia.     Vitals: BP (!) 124/90   Pulse 91   Temp 97  F (36.1  C)   Resp 18   Ht 1.6 m (5' 3\")   Wt 65.1 kg (143 lb 8 oz)   SpO2 93%   BMI 25.42 kg/m    Exam:  GENERAL APPEARANCE: Alert, in no distress, cooperative.   ENT: Mouth/posterior oropharynx intact w/ moist mucous membranes, hearing acuity [place].  EYES: EOM, conjunctivae, lids, pupils and irises normal, PERRL2.   RESP: Respiratory effort unlabored, no respiratory distress, Lung sounds clear. On RA.   CV: Auscultation of heart reveals S1, S2, rate and rhythm regular, no murmur, no rub or gallop, Edema 0+ BLE. Peripheral pulses are 2+.  ABDOMEN: Normal bowel sounds, soft, non-tender abdomen, and no masses palpated.  SKIN: Inspection/Palpation of skin and subcutaneous tissue baseline w/ fragility. No wounds/rashes noted.   NEURO: CN II-XII at patient's baseline, sensation unknown but appears at baseline.  PSYCH: Insight, judgement, and memory are impaired at baseline, affect and mood are happy/flat.    Lab/Diagnostic data:   Recent labs in [employer] reviewed by me today.     ASSESSMENT/PLAN  Other Alzheimer's disease (CODE) (H)  Mild major depression (H)  Chronic systolic congestive heart failure (H)  Frail elderly  Hospice care- [facility]  Acute on chronic. Ongoing.    Sherri appears comfortable today and continues with hospice services.  She was smiling and alert during our visit, but could not contribute to ROS/HPI    Her CHF appears controlled at baseline, with no edema or shortness of breath.    Unknown if " depression is controlled but appears comfortable, Alzheimer's disease has advanced and Sherri is certainly appropriate to continue with hospice services.  Follow up routinely or as needed.    Orders:  No new orders.    Electronically signed by:  VALERIE Solano CNP DNP          Sincerely,        VALERIE Howard CNP

## 2022-04-11 ENCOUNTER — NURSING HOME VISIT (OUTPATIENT)
Dept: GERIATRICS | Facility: CLINIC | Age: 87
End: 2022-04-11
Payer: COMMERCIAL

## 2022-04-11 VITALS
SYSTOLIC BLOOD PRESSURE: 129 MMHG | DIASTOLIC BLOOD PRESSURE: 82 MMHG | HEART RATE: 98 BPM | WEIGHT: 145.8 LBS | OXYGEN SATURATION: 97 % | TEMPERATURE: 97.9 F | HEIGHT: 63 IN | BODY MASS INDEX: 25.83 KG/M2 | RESPIRATION RATE: 18 BRPM

## 2022-04-11 DIAGNOSIS — Z51.5 HOSPICE CARE: ICD-10-CM

## 2022-04-11 DIAGNOSIS — R54 FRAIL ELDERLY: ICD-10-CM

## 2022-04-11 DIAGNOSIS — I50.22 CHRONIC SYSTOLIC CONGESTIVE HEART FAILURE (H): ICD-10-CM

## 2022-04-11 DIAGNOSIS — G30.8 OTHER ALZHEIMER'S DISEASE (CODE) (H): Primary | ICD-10-CM

## 2022-04-11 PROCEDURE — 99308 SBSQ NF CARE LOW MDM 20: CPT | Performed by: FAMILY MEDICINE

## 2022-04-11 NOTE — PROGRESS NOTES
"[place] [facility]ERIAT[facility]IC SERVICES  Chief Complaint   Patient presents with     group home Regulatory     [place] Medical Record Number:  [id number]  Place of Service where encounter took place:  [facility] () [[id number]]    HPI:    Sherri Bender  is 89 year old (6/6/1932), who is being seen today for a federally mandated E/M visit.  HPI information obtained from: facility staff and [facility] chart review.     Today's concerns are:   - RN and DNP have no concern. Resident progressively declining, continued to be enrolled in hospice care.   --------------------------------  - - Past Medical, social, family histories, medications, and allergies reviewed and updated  - Medications reviewed: in the chart and EHR.   - Case Management:   I have reviewed the care plan and MDS and do agree with the plan. Patient's desire to return to the community is not present.  Information reviewed:  Medications, vital signs, orders, and nursing notes.    MEDICATIONS:  Current Outpatient Medications   Medication Sig Dispense Refill     guaiFENesin (ROBITUSSIN) 100 MG/5ML SYRP Take 20 mLs by mouth every 4 hours as needed for cough       LORazepam (ATIVAN) 2 MG/ML (HIGH CONC) solution Take 0.25 mLs (0.5 mg) by mouth At Bedtime 30 mL 3     morphine sulfate 20 MG/5ML SOLN Take 0.25 mLs by mouth 2 times daily Take 0.25 mL by mouth every 1 hour as needed       polyethylene glycol (MIRALAX/GLYCOLAX) Packet Take 17 g by mouth daily       senna-docusate (SENOKOT-S;PERICOLACE) 8.6-50 MG per tablet Take 1 tablet by mouth 2 times daily       ROS: Unobtainable secondary to cognitive impairment.     Vitals:  /82   Pulse 98   Temp 97.9  F (36.6  C)   Resp 18   Ht 1.6 m (5' 3\")   Wt 66.1 kg (145 lb 12.8 oz)   SpO2 97%   BMI 25.83 kg/m    Body mass index is 25.83 kg/m .  Exam:  GENERAL APPEARANCE:  Laying in bed, lethargic  RESP:  lungs clear to auscultation   CV:  S1S2 audible, regular HR, no murmur appreciated.   ABDOMEN:  " Yes x 4   soft, NT/ND, BS audible. no mass appreciated on palpation.   NEURO:   No involuntary movements      Lab/Diagnostic data: no new data to review.       ASSESSMENT/PLAN  ---------------------------  Systolic congestive heart failure, unspecified congestive heart failure chronicity (H)  HTN, essential  - compensated. No concern. Not on meds.       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 Chronic condition, ongoing decline expected.   -  Continue to provide redirection and reassurance as needed. Maintain safe living situation with goals focused on comfort.  - progressively declining.   -Appreciate collaboration with  hospice team for symptom management in collaboration with cares for maximum comfort at end-of-life        Electronically signed by:  Aquiles Hernandez MD

## 2022-04-11 NOTE — LETTER
"    4/11/2022        RE: Sherri Gustafson  [address] [facility] [address]        [facility]  Chief Complaint   Patient presents with     jail Regulatory     [place] Medical Record Number:  [id number]  Place of Service where encounter took place:  [facility] ([facility]) [[id number]]    HPI:    Sherri Bender  is 89 year old (6/6/1932), who is being seen today for a federally mandated E/M visit.  HPI information obtained from: facility staff and [facility] chart review.     Today's concerns are:   - RN and DNP have no concern. Resident progressively declining, continued to be enrolled in hospice care.   --------------------------------  - - Past Medical, social, family histories, medications, and allergies reviewed and updated  - Medications reviewed: in the chart and EHR.   - Case Management:   I have reviewed the care plan and MDS and do agree with the plan. Patient's desire to return to the community is not present.  Information reviewed:  Medications, vital signs, orders, and nursing notes.    MEDICATIONS:  Current Outpatient Medications   Medication Sig Dispense Refill     guaiFENesin (ROBITUSSIN) 100 MG/5ML SYRP Take 20 mLs by mouth every 4 hours as needed for cough       LORazepam (ATIVAN) 2 MG/ML (HIGH CONC) solution Take 0.25 mLs (0.5 mg) by mouth At Bedtime 30 mL 3     morphine sulfate 20 MG/5ML SOLN Take 0.25 mLs by mouth 2 times daily Take 0.25 mL by mouth every 1 hour as needed       polyethylene glycol (MIRALAX/GLYCOLAX) Packet Take 17 g by mouth daily       senna-docusate (SENOKOT-S;PERICOLACE) 8.6-50 MG per tablet Take 1 tablet by mouth 2 times daily       ROS: Unobtainable secondary to cognitive impairment.     Vitals:  /82   Pulse 98   Temp 97.9  F (36.6  C)   Resp 18   Ht 1.6 m (5' 3\")   Wt 66.1 kg (145 lb 12.8 oz)   SpO2 97%   BMI 25.83 kg/m    Body mass index is 25.83 kg/m .  Exam:  GENERAL APPEARANCE:  Laying in " bed, lethargic  RESP:  lungs clear to auscultation   CV:  S1S2 audible, regular HR, no murmur appreciated.   ABDOMEN:  soft, NT/ND, BS audible. no mass appreciated on palpation.   NEURO:   No involuntary movements      Lab/Diagnostic data: no new data to review.       ASSESSMENT/PLAN  ---------------------------  Systolic congestive heart failure, unspecified congestive heart failure chronicity (H)  HTN, essential  - compensated. No concern. Not on meds.       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 Chronic condition, ongoing decline expected.   -  Continue to provide redirection and reassurance as needed. Maintain safe living situation with goals focused on comfort.  - progressively declining.   -Appreciate collaboration with  hospice team for symptom management in collaboration with cares for maximum comfort at end-of-life        Electronically signed by:  Aquiles Hernandez MD        Sincerely,        Aquiles Hernandez MD

## 2022-06-07 ENCOUNTER — NURSING HOME VISIT (OUTPATIENT)
Dept: GERIATRICS | Facility: CLINIC | Age: 87
End: 2022-06-07
Payer: MEDICARE

## 2022-06-07 VITALS
RESPIRATION RATE: 18 BRPM | OXYGEN SATURATION: 93 % | BODY MASS INDEX: 25.2 KG/M2 | SYSTOLIC BLOOD PRESSURE: 103 MMHG | HEIGHT: 63 IN | WEIGHT: 142.2 LBS | TEMPERATURE: 97.3 F | DIASTOLIC BLOOD PRESSURE: 63 MMHG | HEART RATE: 80 BPM

## 2022-06-07 DIAGNOSIS — G30.8 OTHER ALZHEIMER'S DISEASE (CODE) (H): Primary | ICD-10-CM

## 2022-06-07 DIAGNOSIS — R54 FRAIL ELDERLY: ICD-10-CM

## 2022-06-07 DIAGNOSIS — I10 ESSENTIAL HYPERTENSION: ICD-10-CM

## 2022-06-07 DIAGNOSIS — K59.01 SLOW TRANSIT CONSTIPATION: ICD-10-CM

## 2022-06-07 DIAGNOSIS — Z51.5 HOSPICE CARE: ICD-10-CM

## 2022-06-07 PROCEDURE — 99308 SBSQ NF CARE LOW MDM 20: CPT | Mod: GV | Performed by: NURSE PRACTITIONER

## 2022-06-07 NOTE — LETTER
"    6/7/2022        RE: Sherri Bender  [place]  [address] [facility] [address]        [facility]  Chief Complaint   Patient presents with     retirement Regulatory   [place] MRN: [id number] Place of Service where encounter took place:  [facility] () [[id number]] HPI: Sherri Bender  is 89 year old (6/6/1932), who is being seen today for a federally mandated E/M visit.  HPI information obtained from: facility chart records, facility staff, patient report, [facility] chart review, and Care Everywhere [facility] chart review. Today's concerns are:    [place] seen today per federal mandate.  Hospice has no new concerns, and nursing has no new concerns.  Sherri is mostly nonverbal, but can smile and nod sometimes or give one-word answers.  She says she is doing \"okay\" today when asked.  Otherwise she cannot contribute to HPI/ROS.    ALLERGIES:Patient has no known allergies.PAST MEDICAL HISTORY:   has a past medical history of Dementia (H), Depression, Environmental allergies, Family history of ischemic heart disease (2/9/2010), HTN (hypertension), and Osteoarthritis. PAST SURGICAL HISTORY:   has a past surgical history that includes appendectomy; surgical history of - ; surgical history of - ; tonsillectomy; cataract iol, rt/lt (4/2010); and Colonoscopy (6/20/2011).FAMILY HISTORY: family history includes Allergies in her brother; Cancer in her brother and mother; Heart Disease in her father and mother.SOCIAL HISTORY:  reports that she has quit smoking. She has never used smokeless tobacco. She reports that she does not drink alcohol and does not use drugs.  MEDICATIONS:  Current Outpatient Medications   Medication Sig Dispense Refill     guaiFENesin (ROBITUSSIN) 100 MG/5ML SYRP Take 20 mLs by mouth every 4 hours as needed for cough       LORazepam (ATIVAN) 2 MG/ML (HIGH CONC) solution Take 0.25 mLs (0.5 mg) by mouth At Bedtime 30 mL 3     morphine sulfate 20 MG/5ML " "SOLN Take 0.25 mLs by mouth 2 times daily Take 0.25 mL by mouth every 1 hour as needed       polyethylene glycol (MIRALAX/GLYCOLAX) Packet Take 17 g by mouth daily       senna-docusate (SENOKOT-S;PERICOLACE) 8.6-50 MG per tablet Take 1 tablet by mouth 2 times daily       Case Management:  I have reviewed the care plan and MDS and do agree with the plan. Patient's desire to return to the community is not assessible due to cognitive impairment. Information reviewed:  Medications, vital signs, orders, and nursing notes.    ROS: Limited secondary to cognitive impairment but today nursing reports above.    Vitals: /63   Pulse 80   Temp 97.3  F (36.3  C)   Resp 18   Ht 1.6 m (5' 3\")   Wt 64.5 kg (142 lb 3.2 oz)   SpO2 93%   BMI 25.19 kg/m    Exam:  GENERAL APPEARANCE: Alert, in no distress, cooperative.   ENT: Mouth/posterior oropharynx intact w/ moist mucous membranes, hearing acuity [place].  EYES: EOM, conjunctivae, lids, pupils and irises normal, PERRL2.   RESP: Respiratory effort fair, no respiratory distress, Lung sounds clear. On RA.   CV: Auscultation of heart reveals S1, S2, rate and rhythm regular, no murmur, no rub or gallop, Edema 0+ BLE. Peripheral pulses are 2+.  ABDOMEN: Normal bowel sounds, soft, non-tender abdomen, and no masses palpated.  SKIN: Inspection/Palpation of skin and subcutaneous tissue baseline w/ fragility. No wounds/rashes noted.  PSYCH: Insight, judgement, and memory are baseline impaired, affect and mood are happy/engaged.    Lab/Diagnostic data:   Recent labs in [employer] reviewed by me today.     ASSESSMENT/PLAN  Other Alzheimer's disease (H)  Frail elderly  Essential hypertension  Slow transit constipation  [facility] care- [facility]  Chronic. Ongoing.    Chart review shows that no use of as needed lorazepam has been needed tracking more than 30 days back.  Will discontinue disorder as Sherri seems comfortable.    Blood pressures appear to be controlled, will continue plan of " care.    Occasional measures for constipation are taken, and hospice assists with this.  Follow up routinely or as needed.    Orders:  1. Discontinue PRN Lorazepam.     Electronically signed by:  VALERIE Solano CNP DNP          Sincerely,        VALERIE Howard CNP

## 2022-06-07 NOTE — PROGRESS NOTES
"[facility] Regulatory  Chief Complaint   Patient presents with     [facility] Regulatory   [place] MRN: [id number] Place of Service where encounter took place:  [facility] () [[id number]] HPI: Sherri Bender  is 89 year old (6/6/1932), who is being seen today for a federally mandated E/M visit.  HPI information obtained from: facility chart records, facility staff, patient report, [facility] chart review, and Care Everywhere [facility] chart review. Today's concerns are:    [place] seen today per federal mandate.  Hospice has no new concerns, and nursing has no new concerns.  Sherri is mostly nonverbal, but can smile and nod sometimes or give one-word answers.  She says she is doing \"okay\" today when asked.  Otherwise she cannot contribute to HPI/ROS.    ALLERGIES:Patient has no known allergies.PAST MEDICAL HISTORY:   has a past medical history of Dementia (H), Depression, Environmental allergies, Family history of ischemic heart disease (2/9/2010), HTN (hypertension), and Osteoarthritis. PAST SURGICAL HISTORY:   has a past surgical history that includes appendectomy; surgical history of - ; surgical history of - ; tonsillectomy; cataract iol, rt/lt (4/2010); and Colonoscopy (6/20/2011).FAMILY HISTORY: family history includes Allergies in her brother; Cancer in her brother and mother; Heart Disease in her father and mother.SOCIAL HISTORY:  reports that she has quit smoking. She has never used smokeless tobacco. She reports that she does not drink alcohol and does not use drugs.  MEDICATIONS:  Current Outpatient Medications   Medication Sig Dispense Refill     guaiFENesin (ROBITUSSIN) 100 MG/5ML SYRP Take 20 mLs by mouth every 4 hours as needed for cough       LORazepam (ATIVAN) 2 MG/ML (HIGH CONC) solution Take 0.25 mLs (0.5 mg) by mouth At Bedtime 30 mL 3     morphine sulfate 20 MG/5ML SOLN Take 0.25 mLs by mouth 2 times daily Take 0.25 mL by mouth every 1 hour as needed       polyethylene glycol " "(MIRALAX/GLYCOLAX) Packet Take 17 g by mouth daily       senna-docusate (SENOKOT-S;PERICOLACE) 8.6-50 MG per tablet Take 1 tablet by mouth 2 times daily       Case Management:  I have reviewed the care plan and MDS and do agree with the plan. Patient's desire to return to the community is not assessible due to cognitive impairment. Information reviewed:  Medications, vital signs, orders, and nursing notes.    ROS: Limited secondary to cognitive impairment but today nursing reports above.    Vitals: /63   Pulse 80   Temp 97.3  F (36.3  C)   Resp 18   Ht 1.6 m (5' 3\")   Wt 64.5 kg (142 lb 3.2 oz)   SpO2 93%   BMI 25.19 kg/m    Exam:  GENERAL APPEARANCE: Alert, in no distress, cooperative.   ENT: Mouth/posterior oropharynx intact w/ moist mucous membranes, hearing acuity [place].  EYES: EOM, conjunctivae, lids, pupils and irises normal, PERRL2.   RESP: Respiratory effort fair, no respiratory distress, Lung sounds clear. On RA.   CV: Auscultation of heart reveals S1, S2, rate and rhythm regular, no murmur, no rub or gallop, Edema 0+ BLE. Peripheral pulses are 2+.  ABDOMEN: Normal bowel sounds, soft, non-tender abdomen, and no masses palpated.  SKIN: Inspection/Palpation of skin and subcutaneous tissue baseline w/ fragility. No wounds/rashes noted.  PSYCH: Insight, judgement, and memory are baseline impaired, affect and mood are happy/engaged.    Lab/Diagnostic data:   Recent labs in [facility] reviewed by me today.     ASSESSMENT/PLAN  Other Alzheimer's disease (H)  Frail elderly  Essential hypertension  Slow transit constipation  [facility] care- [facility]  Chronic. Ongoing.    Chart review shows that no use of as needed lorazepam has been needed tracking more than 30 days back.  Will discontinue disorder as Sherri seems comfortable.    Blood pressures appear to be controlled, will continue plan of care.    Occasional measures for constipation are taken, and hospice assists with this.  Follow up routinely or as " needed.    Orders:  1. Discontinue PRN Lorazepam.     Electronically signed by:  Dr. eGnia Titus, APRN CNP DNP

## 2022-06-27 ENCOUNTER — PATIENT OUTREACH (OUTPATIENT)
Dept: GERIATRIC MEDICINE | Facility: CLINIC | Age: 87
End: 2022-06-27

## 2022-06-27 NOTE — LETTER
June 27, 2022    Important [employer] Information    DENTON MALLORY  C/O LINDSAY MATHIS  [address]  [place] [address]    Your New Care Coordinator  Dear Denton,  My name is ROSLYN Patel, RN, PHN, CCM and I am your new Care Coordinator. You may reach me by calling [phone]. I will be in touch with you shortly to address any questions you may have.   I have also been in contact with Genia Titus, your previous care coordinator, to ensure a smooth transition.  Questions?  Call me at [phone] Monday-Friday between 8am and 5pm. TTY: [phone]. I look forward to working with you as a [employer]  member.  Sincerely,    ROSLYN Patel, RN, PHN, CCM  [phone]  [email or web address]    cc: member records                                                                                                                        CB[facility] ([facility] (5-2020)    Civil Rights Notice  Discrimination is against the law. [employer] does not discriminate on the basis of any of the following:    Race    Color    National Origin    Creed    Faith    Age    Public Assistance Status    Receipt of Health Care Services    Disability (including physical or mental impairment)    Sex (including sex stereotypes and gender identity)    Marital Status    Political Beliefs    Medical Condition    Genetic Information    Sexual Orientation    Claims Experience    Medical History    Health Status    Auxiliary Aids and Services:  [employer] provides auxiliary aids and services, like qualified interpreters or information in accessible formats, free of charge and in a timely manner, to ensure an equal opportunity to participate in our health care programs. Contact [employer] at [employer]/contact medicaid or call [phone] (toll free); TTY:711 or at [email or web address].    Language Assistance Services:  [employer] provides translated documents and spoken language interpreting, free of charge and in a timely manner, when  language assistance services are necessary to ensure limited English speakers have meaningful access to our information and services. Contact [employer] at [phone] (toll free); TTY: 711 or [email or web address].     Civil Rights Complaints  You have the right to file a discrimination complaint if you believe you were treated in a discriminatory way by [employer]. You may contact any of the following four agencies directly to file a discrimination complaint.        U.S. Department of Health and Human Services  Office for Civil Rights (OCR)  You have the right to file a complaint with the OCR, a federal agency, if you believe you have been discriminated against because of any of the following:    Race    Disability    Color    Sex    National Origin    Age    Temple (in some cases)    Contact the OCR directly to file a complaint:         Director         U.S. Department of Health and Human Services  Office for Civil Rights         [address] [place] [address]         Customer Response Center: Toll-free: [phone]          TDD: [phone]         Email: [email or web address]    [place] Department of Human Rights (MDHR)  In [place], you have the right to file a complaint with the MDHR if you believe you have been discriminated against because of any of the following:      Race    Color    National Origin    Temple    Creed    Sex    Sexual Orientation    Marital Status    Public Assistance Status    Disability    Contact the MDHR directly to file a complaint:         [place] Department of Human Rights         [address] [place] [address]         [phone] (voice)          [phone] (toll free)         711 or [phone] ([place] Relay)         [phone] (Fax)         [email or web address] (Email)     [place] Department of Human Services (DHS)  You have the right to file a complaint with DHS if  you believe you have been discriminated against in our health care programs because of any of the following:    Race    Color    National Origin    Creed    Yarsanism    Age    Public Assistance Status    Receipt of Health Care Services    Disability (including physical or mental impairment)    Sex (including sex stereotypes and gender identity)    Marital Status    Political Beliefs    Medical Condition    Genetic Information    Sexual Orientation    Claims Experience    Medical History    Health Status    Complaints must be in writing and filed within 180 days of the date you discovered the alleged discrimination. The complaint must contain your name and address and describe the discrimination you are complaining about. After we get your complaint, we will review it and notify you in writing about whether we have authority to investigate. If we do, we will investigate the complaint.      [facility] will notify you in writing of the investigation s outcome. You have a right to appeal the outcome if you disagree with the decision. To appeal, you must send a written request to have [facility] review the investigation outcome. Be brief and state why you disagree with the decision. Include additional information you think is important.      If you file a complaint in this way, the people who work for the agency named in the complaint cannot retaliate against you. This means they cannot punish you in any way for filing a complaint. Filing a complaint in this way does not stop you from seeking out other legal or administration actions.     Contact [facility] directly to file a discrimination complaint:        Civil Rights Coordinator        [place] Department of Human Services        Equal Opportunity and Access Division        [address] [address]        [place] [address] [phone] (voice) or use your preferred relay service     [employer] Complaint Notice   You have the right to file a complaint with [employer] if you believe you have  been discriminated against because of any of the following:       Medical condition    Health status    Receipt of health care services    Claims experience    Medical history    Genetic information    Disability (including mental or physical impairment)    Marital status    Age    Sex (including sex stereotypes and gender identity)    Sexual orientation    National origin    Race    Color    Latter-day    Creed    Public assistance status    Political beliefs    You can file a complaint and ask for help in filing a complaint in person or by mail, phone, fax, or email at:     [employer] Civil Rights Coordinator  [facility]  [address], [place] [address] [phone] (voice and fax) or TTK:[email or web address]  Email: [email or web address]    American Indians can begin or continue to use [place] and [facility] ([facility]) clinics. We will not require prior approval or impose any conditions for you to get services at these clinics. For elders age 65 years and older this includes Elderly Waiver (EW) services accessed through the [place]. If a doctor or other provider in a [place] or [facility] clinic refers you to a provider in our network, we will not require you to see your primary care provider prior to the referral.

## 2022-06-27 NOTE — PROGRESS NOTES
[facility] Care Coordination Contact    Internal CC change effective 7/1/22.  Mailed member CC Change letter.  Additional tasks to be completed by CMS include: update database & EPIC, and create member files on [employer].

## 2022-08-07 NOTE — PROGRESS NOTES
"[facility]  Chief Complaint   Patient presents with     snf Regulatory     [place] Medical Record Number:  [id number]  Place of Service where encounter took place:  [facility] () [[id number]]    HPI:    Sherri Bender  is 89 year old (6/6/1932), who is being seen today for a federally mandated E/M visit.  HPI information obtained from: facility staff and [facility] chart review.     Today's concerns are:   - Resident seen and examined, chart reviewed and discussed with the nursing staff.   - DNP and RN have no concern today.  Report still enrolled in hospice care, slow decline.     --------------------------------  - Past Medical, social, family histories, medications, and allergies reviewed and updated  - Medications reviewed: in the chart and EHR.   - Case Management:   I have reviewed the care plan and MDS and do agree with the plan. Patient's desire to return to the community is not present.  Information reviewed:  Medications, vital signs, orders, and nursing notes.    MEDICATIONS:  Current Outpatient Medications   Medication Sig Dispense Refill     guaiFENesin (ROBITUSSIN) 100 MG/5ML SYRP Take 20 mLs by mouth every 4 hours as needed for cough       LORazepam (ATIVAN) 2 MG/ML (HIGH CONC) solution Take 0.25 mLs (0.5 mg) by mouth At Bedtime 30 mL 3     morphine sulfate 20 MG/5ML SOLN Take 0.25 mLs by mouth 2 times daily Take 0.25 mL by mouth every 1 hour as needed       polyethylene glycol (MIRALAX/GLYCOLAX) Packet Take 17 g by mouth daily       senna-docusate (SENOKOT-S;PERICOLACE) 8.6-50 MG per tablet Take 1 tablet by mouth 2 times daily       ROS: Unobtainable secondary to cognitive impairment.     Vitals:  /74   Pulse 78   Temp 97  F (36.1  C)   Resp 18   Ht 1.6 m (5' 3\")   Wt 63.3 kg (139 lb 9.6 oz)   SpO2 94%   BMI 24.73 kg/m    Body mass index is 24.73 kg/m .  Exam: performed today 8/8/22  GENERAL APPEARANCE:  Laying in bed, lethargic  RESP:  lungs " clear to auscultation   CV:  S1S2 audible, regular HR, no murmur appreciated.   ABDOMEN:  soft, NT/ND, BS audible. no mass appreciated on palpation.   NEURO:   No involuntary movements      Lab/Diagnostic data: no new data to review.       ASSESSMENT/PLAN  ---------------------------  Systolic congestive heart failure, unspecified congestive heart failure chronicity (H)  HTN, essential  - compensated. No concern. Not on meds.       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 Chronic condition, ongoing decline expected.   -  Continue to provide redirection and reassurance as needed. Maintain safe living situation with goals focused on comfort.  - progressively slow declining.   -Appreciate collaboration with  hospice team for symptom management in collaboration with cares for maximum comfort at end-of-life        Electronically signed by:  Aquiles Hernandez MD

## 2022-08-08 ENCOUNTER — NURSING HOME VISIT (OUTPATIENT)
Dept: GERIATRICS | Facility: CLINIC | Age: 87
End: 2022-08-08
Payer: OTHER MISCELLANEOUS

## 2022-08-08 VITALS
RESPIRATION RATE: 18 BRPM | SYSTOLIC BLOOD PRESSURE: 122 MMHG | TEMPERATURE: 97 F | HEIGHT: 63 IN | OXYGEN SATURATION: 94 % | DIASTOLIC BLOOD PRESSURE: 74 MMHG | WEIGHT: 139.6 LBS | HEART RATE: 78 BPM | BODY MASS INDEX: 24.73 KG/M2

## 2022-08-08 DIAGNOSIS — Z51.5 HOSPICE CARE: ICD-10-CM

## 2022-08-08 DIAGNOSIS — G30.8 OTHER ALZHEIMER'S DISEASE (CODE) (H): Primary | ICD-10-CM

## 2022-08-08 DIAGNOSIS — I50.22 CHRONIC SYSTOLIC CONGESTIVE HEART FAILURE (H): ICD-10-CM

## 2022-08-08 DIAGNOSIS — I10 ESSENTIAL HYPERTENSION: ICD-10-CM

## 2022-08-08 DIAGNOSIS — R54 FRAIL ELDERLY: ICD-10-CM

## 2022-08-08 PROCEDURE — 99309 SBSQ NF CARE MODERATE MDM 30: CPT | Mod: GW | Performed by: FAMILY MEDICINE

## 2022-08-08 NOTE — LETTER
"    8/8/2022        RE: [place]  [address] [facility] [address]        [place] [facility]  Chief Complaint   Patient presents with     retirement Regulatory     [place] Medical Record Number:  [id number]  Place of Service where encounter took place:  [facility] ([facility]) [[id number]]    HPI:    Sherri Bender  is 89 year old (6/6/1932), who is being seen today for a federally mandated E/M visit.  HPI information obtained from: facility staff and [facility] chart review.     Today's concerns are:   - Resident seen and examined, chart reviewed and discussed with the nursing staff.   - DNP and RN have no concern today.  Report still enrolled in hospice care, slow decline.     --------------------------------  - Past Medical, social, family histories, medications, and allergies reviewed and updated  - Medications reviewed: in the chart and EHR.   - Case Management:   I have reviewed the care plan and MDS and do agree with the plan. Patient's desire to return to the community is not present.  Information reviewed:  Medications, vital signs, orders, and nursing notes.    MEDICATIONS:  Current Outpatient Medications   Medication Sig Dispense Refill     guaiFENesin (ROBITUSSIN) 100 MG/5ML SYRP Take 20 mLs by mouth every 4 hours as needed for cough       LORazepam (ATIVAN) 2 MG/ML (HIGH CONC) solution Take 0.25 mLs (0.5 mg) by mouth At Bedtime 30 mL 3     morphine sulfate 20 MG/5ML SOLN Take 0.25 mLs by mouth 2 times daily Take 0.25 mL by mouth every 1 hour as needed       polyethylene glycol (MIRALAX/GLYCOLAX) Packet Take 17 g by mouth daily       senna-docusate (SENOKOT-S;PERICOLACE) 8.6-50 MG per tablet Take 1 tablet by mouth 2 times daily       ROS: Unobtainable secondary to cognitive impairment.     Vitals:  /74   Pulse 78   Temp 97  F (36.1  C)   Resp 18   Ht 1.6 m (5' 3\")   Wt 63.3 kg (139 lb 9.6 oz)   SpO2 94%   BMI 24.73 kg/m  "   Body mass index is 24.73 kg/m .  Exam: performed today 8/8/22  GENERAL APPEARANCE:  Laying in bed, lethargic  RESP:  lungs clear to auscultation   CV:  S1S2 audible, regular HR, no murmur appreciated.   ABDOMEN:  soft, NT/ND, BS audible. no mass appreciated on palpation.   NEURO:   No involuntary movements      Lab/Diagnostic data: no new data to review.       ASSESSMENT/PLAN  ---------------------------  Systolic congestive heart failure, unspecified congestive heart failure chronicity (H)  HTN, essential  - compensated. No concern. Not on meds.       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 Chronic condition, ongoing decline expected.   -  Continue to provide redirection and reassurance as needed. Maintain safe living situation with goals focused on comfort.  - progressively slow declining.   -Appreciate collaboration with  hospice team for symptom management in collaboration with cares for maximum comfort at end-of-life        Electronically signed by:  Aquiles Hernandez MD            Sincerely,        Aquiles Hernandez MD

## 2022-08-18 ENCOUNTER — PATIENT OUTREACH (OUTPATIENT)
Dept: GERIATRIC MEDICINE | Facility: CLINIC | Age: 87
End: 2022-08-18

## 2022-09-29 ENCOUNTER — LAB REQUISITION (OUTPATIENT)
Dept: LAB | Facility: CLINIC | Age: 87
End: 2022-09-29
Payer: COMMERCIAL

## 2022-09-29 DIAGNOSIS — R05.9 COUGH, UNSPECIFIED: ICD-10-CM

## 2022-09-30 ENCOUNTER — LAB REQUISITION (OUTPATIENT)
Dept: LAB | Facility: CLINIC | Age: 87
End: 2022-09-30
Payer: COMMERCIAL

## 2022-09-30 DIAGNOSIS — R05.1 ACUTE COUGH: ICD-10-CM

## 2022-09-30 PROCEDURE — 87486 CHLMYD PNEUM DNA AMP PROBE: CPT | Mod: ORL | Performed by: NURSE PRACTITIONER

## 2022-10-03 ENCOUNTER — LAB REQUISITION (OUTPATIENT)
Dept: LAB | Facility: CLINIC | Age: 87
End: 2022-10-03
Payer: COMMERCIAL

## 2022-10-03 DIAGNOSIS — R05.1 ACUTE COUGH: ICD-10-CM

## 2022-10-03 LAB

## 2022-10-11 ENCOUNTER — NURSING HOME VISIT (OUTPATIENT)
Dept: GERIATRICS | Facility: CLINIC | Age: 87
End: 2022-10-11
Payer: COMMERCIAL

## 2022-10-11 DIAGNOSIS — Z51.5 HOSPICE CARE: ICD-10-CM

## 2022-10-11 DIAGNOSIS — R54 FRAIL ELDERLY: ICD-10-CM

## 2022-10-11 DIAGNOSIS — I10 ESSENTIAL HYPERTENSION: ICD-10-CM

## 2022-10-11 DIAGNOSIS — G30.8 OTHER ALZHEIMER'S DISEASE (CODE) (H): Primary | ICD-10-CM

## 2022-10-11 DIAGNOSIS — I50.22 CHRONIC SYSTOLIC CONGESTIVE HEART FAILURE (H): ICD-10-CM

## 2022-10-11 PROCEDURE — 99308 SBSQ NF CARE LOW MDM 20: CPT | Mod: GV | Performed by: NURSE PRACTITIONER

## 2022-10-11 NOTE — LETTER
10/11/2022        RE: Sherri Bender  [place]  [address] [facility] [address]        [facility] Regulatory  Chief Complaint   Patient presents with     [facility] MRN: [id number] Place of Service where encounter took place:  [facility] () [[id number]] HPI: Sherri Bender  is 89 year old (6/6/1932), who is being seen today for a federally mandated E/M visit.  HPI information obtained from: facility chart records, facility staff, patient report, [facility] chart review, and Care Everywhere [facility] chart review. Today's concerns are:    Sherri seen today for a federally mandated visit. She is nonverbal and unable to contribute to HPI. Per nursing, she is s/p rhinovirus, but may also be transitioning.     ALLERGIES:Patient has no known allergies.PAST MEDICAL HISTORY:   has a past medical history of Dementia (H), Depression, Environmental allergies, Family history of ischemic heart disease (2/9/2010), HTN (hypertension), and Osteoarthritis. PAST SURGICAL HISTORY:   has a past surgical history that includes appendectomy; surgical history of - ; surgical history of - ; tonsillectomy; cataract iol, rt/lt (4/2010); and Colonoscopy (6/20/2011).FAMILY HISTORY: family history includes Allergies in her brother; Cancer in her brother and mother; Heart Disease in her father and mother.SOCIAL HISTORY:  reports that she has quit smoking. She has never used smokeless tobacco. She reports that she does not drink alcohol and does not use drugs.  MEDICATIONS:  Current Outpatient Medications   Medication Sig Dispense Refill     guaiFENesin (ROBITUSSIN) 100 MG/5ML SYRP Take 20 mLs by mouth every 4 hours as needed for cough       LORazepam (ATIVAN) 2 MG/ML (HIGH CONC) solution Take 0.25 mLs (0.5 mg) by mouth At Bedtime 30 mL 3     morphine sulfate 20 MG/5ML SOLN Take 0.25 mLs by mouth 2 times daily Take 0.25 mL by mouth every 1 hour as needed       polyethylene  "glycol (MIRALAX/GLYCOLAX) Packet Take 17 g by mouth daily       senna-docusate (SENOKOT-S;PERICOLACE) 8.6-50 MG per tablet Take 1 tablet by mouth 2 times daily       Case Management:  I have reviewed the care plan and MDS and do agree with the plan. Patient's desire to return to the community is not assessible due to cognitive impairment. Information reviewed:  Medications, vital signs, orders, and nursing notes.    ROS: Unobtainable secondary to cognitive impairment and secondary to aphasia.     Vitals: /66   Pulse 88   Temp 97.9  F (36.6  C)   Resp 20   Ht 1.6 m (5' 3\")   Wt 59.6 kg (131 lb 8 oz)   SpO2 (!) 89%   BMI 23.29 kg/m    Exam:  GENERAL APPEARANCE: drowsy, in no distress, cooperative.   ENT: Mouth/posterior oropharynx intact w/ moist mucous membranes, hearing acuity [place].  EYES: EOM, conjunctivae, lids, pupils and irises normal, PERRL2.   RESP: Respiratory effort fair, no respiratory distress, Lung sounds clear. On RA.   CV: Auscultation of heart reveals S1, S2, rate and rhythm regular, no murmur, no rub or gallop, Edema 0+ BLE. Peripheral pulses are 2+.  ABDOMEN: Normal bowel sounds, soft, non-tender abdomen, and no masses palpated.  SKIN: Inspection/Palpation of skin and subcutaneous tissue baseline w/ fragility. No wounds/rashes noted.   NEURO: CN II-XII at patient's baseline, sensation baseline unknown.  PSYCH: Insight, judgement, and memory are baseline impaired/unknown, affect and mood are withdrawn.    Lab/Diagnostic data:   Recent labs in EPIC reviewed by me today.     ASSESSMENT/PLAN  Other Alzheimer's disease (H)  Chronic systolic congestive heart failure (H)  Essential hypertension  Frail elderly  Hospice care- [facility]  Progressive. Chronic.     Sherri is resting comfortably and has comfort meds available.     Recent rhinovirus, which appears to be resolving, but has been difficult for Sherri.    No evidence of CHF exacerbation and BPs are controlled. She has some mild hypoxia and " nursing is utilizing a nebulizer. O2 is available PRN.   Follow up routinely or as needed.    Orders:  No new orders.    Electronically signed by:  VALERIE Solano CNP DNP          Sincerely,        VALERIE Howard CNP

## 2022-10-11 NOTE — PROGRESS NOTES
[facility] [place] Regulatory  Chief Complaint   Patient presents with     [facility] Regulatory   [place] MRN: [id number] Place of Service where encounter took place:  [facility] () [[id number]] HPI: Sherri Bender  is 89 year old (6/6/1932), who is being seen today for a federally mandated E/M visit.  HPI information obtained from: facility chart records, facility staff, patient report, [facility] chart review, and Care Everywhere [facility] chart review. Today's concerns are:    Sherri seen today for a federally mandated visit. She is nonverbal and unable to contribute to HPI. Per nursing, she is s/p rhinovirus, but may also be transitioning.     ALLERGIES:Patient has no known allergies.PAST MEDICAL HISTORY:   has a past medical history of Dementia (H), Depression, Environmental allergies, Family history of ischemic heart disease (2/9/2010), HTN (hypertension), and Osteoarthritis. PAST SURGICAL HISTORY:   has a past surgical history that includes appendectomy; surgical history of - ; surgical history of - ; tonsillectomy; cataract iol, rt/lt (4/2010); and Colonoscopy (6/20/2011).FAMILY HISTORY: family history includes Allergies in her brother; Cancer in her brother and mother; Heart Disease in her father and mother.SOCIAL HISTORY:  reports that she has quit smoking. She has never used smokeless tobacco. She reports that she does not drink alcohol and does not use drugs.  MEDICATIONS:  Current Outpatient Medications   Medication Sig Dispense Refill     guaiFENesin (ROBITUSSIN) 100 MG/5ML SYRP Take 20 mLs by mouth every 4 hours as needed for cough       LORazepam (ATIVAN) 2 MG/ML (HIGH CONC) solution Take 0.25 mLs (0.5 mg) by mouth At Bedtime 30 mL 3     morphine sulfate 20 MG/5ML SOLN Take 0.25 mLs by mouth 2 times daily Take 0.25 mL by mouth every 1 hour as needed       polyethylene glycol (MIRALAX/GLYCOLAX) Packet Take 17 g by mouth daily       senna-docusate (SENOKOT-S;PERICOLACE) 8.6-50 MG per tablet  "Take 1 tablet by mouth 2 times daily       Case Management:  I have reviewed the care plan and MDS and do agree with the plan. Patient's desire to return to the community is not assessible due to cognitive impairment. Information reviewed:  Medications, vital signs, orders, and nursing notes.    ROS: Unobtainable secondary to cognitive impairment and secondary to aphasia.     Vitals: /66   Pulse 88   Temp 97.9  F (36.6  C)   Resp 20   Ht 1.6 m (5' 3\")   Wt 59.6 kg (131 lb 8 oz)   SpO2 (!) 89%   BMI 23.29 kg/m    Exam:  GENERAL APPEARANCE: drowsy, in no distress, cooperative.   ENT: Mouth/posterior oropharynx intact w/ moist mucous membranes, hearing acuity [place].  EYES: EOM, conjunctivae, lids, pupils and irises normal, PERRL2.   RESP: Respiratory effort fair, no respiratory distress, Lung sounds clear. On RA.   CV: Auscultation of heart reveals S1, S2, rate and rhythm regular, no murmur, no rub or gallop, Edema 0+ BLE. Peripheral pulses are 2+.  ABDOMEN: Normal bowel sounds, soft, non-tender abdomen, and no masses palpated.  SKIN: Inspection/Palpation of skin and subcutaneous tissue baseline w/ fragility. No wounds/rashes noted.   NEURO: CN II-XII at patient's baseline, sensation baseline unknown.  PSYCH: Insight, judgement, and memory are baseline impaired/unknown, affect and mood are withdrawn.    Lab/Diagnostic data:   Recent labs in [facility] reviewed by me today.     ASSESSMENT/PLAN  Other Alzheimer's disease (H)  Chronic systolic congestive heart failure (H)  Essential hypertension  Frail elderly  Hospice care- [facility]  Progressive. Chronic.     Sherri is resting comfortably and has comfort meds available.     Recent rhinovirus, which appears to be resolving, but has been difficult for Sherri.    No evidence of CHF exacerbation and BPs are controlled. She has some mild hypoxia and nursing is utilizing a nebulizer. O2 is available PRN.   Follow up routinely or as needed.    Orders:  No new " orders.    Electronically signed by:  Dr. Genia Titus, APRN CNP DNP

## 2022-10-12 VITALS
OXYGEN SATURATION: 89 % | BODY MASS INDEX: 23.3 KG/M2 | SYSTOLIC BLOOD PRESSURE: 113 MMHG | TEMPERATURE: 97.9 F | HEIGHT: 63 IN | DIASTOLIC BLOOD PRESSURE: 66 MMHG | WEIGHT: 131.5 LBS | HEART RATE: 88 BPM | RESPIRATION RATE: 20 BRPM

## 2022-10-25 ENCOUNTER — NURSING HOME VISIT (OUTPATIENT)
Dept: GERIATRICS | Facility: CLINIC | Age: 87
End: 2022-10-25
Payer: MEDICARE

## 2022-10-25 VITALS
BODY MASS INDEX: 23.28 KG/M2 | SYSTOLIC BLOOD PRESSURE: 91 MMHG | HEART RATE: 88 BPM | DIASTOLIC BLOOD PRESSURE: 61 MMHG | OXYGEN SATURATION: 94 % | TEMPERATURE: 98 F | HEIGHT: 63 IN | RESPIRATION RATE: 16 BRPM | WEIGHT: 131.4 LBS

## 2022-10-25 DIAGNOSIS — R54 FRAIL ELDERLY: ICD-10-CM

## 2022-10-25 DIAGNOSIS — B94.8 POST VIRAL DEBILITY: ICD-10-CM

## 2022-10-25 DIAGNOSIS — R09.82 POST-NASAL DRIP: ICD-10-CM

## 2022-10-25 DIAGNOSIS — R05.8 PRODUCTIVE COUGH: Primary | ICD-10-CM

## 2022-10-25 DIAGNOSIS — Z51.5 HOSPICE CARE: ICD-10-CM

## 2022-10-25 DIAGNOSIS — I50.22 CHRONIC SYSTOLIC CONGESTIVE HEART FAILURE (H): ICD-10-CM

## 2022-10-25 DIAGNOSIS — R53.81 POST VIRAL DEBILITY: ICD-10-CM

## 2022-10-25 DIAGNOSIS — G30.8 OTHER ALZHEIMER'S DISEASE (CODE) (H): ICD-10-CM

## 2022-10-25 PROCEDURE — 99309 SBSQ NF CARE MODERATE MDM 30: CPT | Mod: GV | Performed by: NURSE PRACTITIONER

## 2022-10-25 NOTE — LETTER
10/25/2022        RE: Sherri Bender  [place]  [address] [facility] [address]        [facility] [place] [facility]  Episodic/Acute/Follow-Up  [place] MRN: [id number]. Place of Service where encounter took place:  [facility] () [[id number]]   Chief Complaint   Patient presents with     RECHECK    HPI: Sherri Bender  is a 89 year old (6/6/1932), who is being seen today for an episodic care visit.  HPI information obtained from: facility chart records, facility staff, patient report and [facility] chart review. Today's concern is:    Sherri seen today after provider overheard her coughing in the day room.  She is status post rhinovirus confirmed on lab work, and during our previous visit she was very sick, and thought to have been transitioning with the help of hospice services.    Today, Sherri makes facial expressions to stimuli, but cannot engage in HPI/ROS.  Nursing states that they had thought she was transitioning also, but she appears to have rallied.  Her very productive cough is difficult for her to clear, and they do not note any sputum being expectorated.    Past Medical and Surgical History reviewed in Epic today.  MEDICATIONS:  Current Outpatient Medications   Medication Sig Dispense Refill     atropine 1 % ophthalmic solution Place 2 drops under the tongue every 2 hours as needed       guaiFENesin (ROBITUSSIN) 100 MG/5ML SYRP Take 20 mLs by mouth every 4 hours as needed for cough       LORazepam (ATIVAN) 2 MG/ML (HIGH CONC) solution Take 0.25 mLs (0.5 mg) by mouth At Bedtime 30 mL 3     morphine sulfate 20 MG/5ML SOLN Take 0.25 mLs by mouth 2 times daily Take 0.25 mL by mouth every 1 hour as needed       polyethylene glycol (MIRALAX/GLYCOLAX) Packet Take 17 g by mouth daily       senna-docusate (SENOKOT-S;PERICOLACE) 8.6-50 MG per tablet Take 1 tablet by mouth 2 times daily       REVIEW OF SYSTEMS: Unobtainable secondary to cognitive impairment.  "    Objective: BP 91/61   Pulse 88   Temp 98  F (36.7  C)   Resp 16   Ht 1.6 m (5' 3\")   Wt 59.6 kg (131 lb 6.4 oz)   SpO2 94%   BMI 23.28 kg/m    Exam:  GENERAL APPEARANCE: Alert, in no distress, cooperative.   ENT: Mouth/posterior oropharynx intact w/ moist mucous membranes, hearing acuity [place].  EYES: EOM, conjunctivae, lids, pupils and irises normal, PERRL2.   RESP: Respiratory effort fair, no respiratory distress, Lung sounds clear anteriorly. On RA.   CV: Auscultation of heart reveals S1, S2, rate and rhythm regular, no murmur, no rub or gallop.  SKIN: Inspection/Palpation of skin and subcutaneous tissue baseline w/ fragility. No wounds/rashes noted.   PSYCH: Insight, judgement, and memory are baseline impaired, affect and mood are impaired/withdrawn.    Labs: Labs done in facility are in EPIC. Please refer to them using [employer]/Care Everywhere.    ASSESSMENT/PLAN:  Productive cough  Post-nasal drip  Post viral debility  Chronic systolic congestive heart failure (H)  Other Alzheimer's disease (CODE) (H)  Frail elderly  Hospice care- [facility]  Acute on subacute, on chronic. Ongoing.    Sherri is working with [facility], but noting that there is no medication available for secretions.  We will add as needed atropine drops sublingually.    Will add Mucinex for 1 week to help Sherri expel her secretions if they cannot be dried up.    Will also help with Zyrtec for 7 days, as this may help with postnasal drip and secretion management.    Lungs are clear, and this does not appear to be a CHF exacerbation.    Sherri is nonverbal given Alzheimer's disease, and therefore exam/presentation is somewhat limited.  Hospice to continue their follow-up accordingly.  Follow up routinely or as needed.    Orders:  1. Zyrtec 5mg PO QAM x 7 days. Dx: post-nasal drip  2. Mucinex 400mg PO BID x 7 days. Dx: cough.   3. Atropine 1% opth gtt, 2gtts SL Q2h PRN. Dx: increased secretions.     Electronically signed by:  Dr. Cormier " VALERIE Titus CNP DNP          Sincerely,        VALERIE Howard CNP

## 2022-10-26 RX ORDER — ATROPINE SULFATE 10 MG/ML
2 SOLUTION/ DROPS OPHTHALMIC
COMMUNITY

## 2022-11-16 ENCOUNTER — PATIENT OUTREACH (OUTPATIENT)
Dept: GERIATRIC MEDICINE | Facility: CLINIC | Age: 87
End: 2022-11-16

## 2022-11-16 NOTE — PROGRESS NOTES
Encounter opened due to Regulatory [employer] Update to open FVP Program.    Alexandra Barnett  Care Management Specialist   [facility]   [phone]

## 2022-11-16 NOTE — PROGRESS NOTES
Encounter opened due to Regulatory [employer] Update to close FVP Program.    Alexandra Barnett  Care Management Specialist   [facility]   [phone]

## 2022-11-29 ENCOUNTER — PATIENT OUTREACH (OUTPATIENT)
Dept: GERIATRIC MEDICINE | Facility: CLINIC | Age: 87
End: 2022-11-29

## 2022-11-29 NOTE — PROGRESS NOTES
[facility] Care Coordination Contact:     Emailed Thais WATSON at NH and Genia PADILLA CNP via Epic regarding my upcoming visit to facility on 12/5/22.    Per Thais no currents concerns regarding Sherri, however, the facility is experiencing a COVID outbreak.      RNCC will visit contact facility closer to visit date to see if the outbreak is still present.       Danni Feliciano RN  Care Coordinator-[place] [address]  [email or web address]   [email or web address]     Office: [phone]   Fax: [phone]

## 2022-12-05 NOTE — PROGRESS NOTES
[facility] Institutional Assessment     Institutional Assessment for Health Risk Assessment with Sherri Bender completed on December 7, 2022 at [facility]    Type of residence:: Nursing home  Current living arrangement:: I live in a nursing home     Assessment completed with:: Care Team Member, PETRA-chart review    Mental/Behavioral Health   Depression Screening: na    Mental health DX:: Yes   Mental health DX how managed:: Medication    Falls Assessment:   Fallen 2 or more times in the past year?: No   Any fall with injury in the past year?: No    ADL/IADL Dependencies:   Dependent ADLs:: Bathing, Dressing, Grooming, Eating, Incontinence, Positioning, Transfers, Wheelchair-with assist, Toileting  Dependent IADLs:: Cleaning, Cooking, Laundry, Shopping, Meal Preparation, Medication Management, Money Management, Transportation, Incontinence      Care Plan & Recommendations: Met with Sherri at [facility], introduced self and role. Sherri was sitting in her wheelchair covered with a blanket up to her chin. Her eyes were closed and they did not open when I said her name. Staff was feeding her her lunch which was pureed. They state she does eat pretty good. She was dressed and appeared well kept. Staff reports she is a rosa maria lift and needs total care. Discussed options/opportunities for transitions was not able due to being non verbal. Sherri is enrolled in [facility].    See Institutional Care Plan for detailed assessment information.    Obtained a copy of the facility care plan and MDS from facility electronic records. Requested of correction social worker to put this care coordinator on care conference attendee list.    Placed the Health Plan facility face sheet in the member's facility chart.    Follow-Up Plan: Member informed of future contact, plan to f/u with member with a 6 month assessment, attend 1 care conference annually, and will follow any hospitalizations or  transitions. Care Coordinator contact information shared with member/family and facility, and encouraged to call this care coordinator with any questions or concerns at any time.     [place] care continuum providers: Please see Snapshot and Care Management Flowsheets for Specific details of care plan.    This CC note routed to PCP.    Danni Feliciano RN  Care Coordinator-Long Term Care  [address] [address]  [email or web address]   [email or web address]     Office: [phone]   Fax: [phone]

## 2022-12-09 NOTE — PROGRESS NOTES
"[place] [facility]  Chief Complaint   Patient presents with     [facility] Regulatory     [place] Medical Record Number:  [id number]  Place of Service where encounter took place:  [facility] () [[id number]]    HPI:    Sherri Bender  is 89 year old (6/6/1932), who is being seen today for a federally mandated E/M visit.  HPI information obtained from: facility staff and [facility] chart review.     Today's concerns are:   - Resident seen and examined, chart reviewed and discussed with the nursing staff.   - DNP and RN have no concern today.  Report still enrolled in hospice care, slow decline.     --------------------------------  - Past Medical, social, family histories, medications, and allergies reviewed and updated  - Medications reviewed: in the chart and EHR.   - Case Management:   I have reviewed the care plan and MDS and do agree with the plan. Patient's desire to return to the community is not present.  Information reviewed:  Medications, vital signs, orders, and nursing notes.    MEDICATIONS:  Current Outpatient Medications   Medication Sig Dispense Refill     atropine 1 % ophthalmic solution Place 2 drops under the tongue every 2 hours as needed       guaiFENesin (ROBITUSSIN) 100 MG/5ML SYRP Take 20 mLs by mouth every 4 hours as needed for cough       LORazepam (ATIVAN) 2 MG/ML (HIGH CONC) solution Take 0.25 mLs (0.5 mg) by mouth At Bedtime 30 mL 3     morphine sulfate 20 MG/5ML SOLN Take 0.25 mLs by mouth 2 times daily Take 0.25 mL by mouth every 1 hour as needed       polyethylene glycol (MIRALAX/GLYCOLAX) Packet Take 17 g by mouth daily       senna-docusate (SENOKOT-S;PERICOLACE) 8.6-50 MG per tablet Take 1 tablet by mouth 2 times daily       ROS: Unobtainable secondary to cognitive impairment.     Vitals:  /65   Pulse 83   Temp 97.1  F (36.2  C)   Resp 16   Ht 1.6 m (5' 3\")   Wt 58.1 kg (128 lb)   SpO2 94%   BMI 22.67 kg/m    Body mass index is 22.67 " kg/m .  Exam: performed today 12/12/22  GENERAL APPEARANCE:  Sitting up in the broda chair, very lethargic  RESP:  breathing unlabored.   NEURO:   No involuntary movements  Psych: very lethargic  Skin: thin and frail  Eyes: closed      Lab/Diagnostic data: no new data to review.       ASSESSMENT/PLAN  ---------------------------  Systolic congestive heart failure, unspecified congestive heart failure chronicity (H)  HTN, essential  - compensated. No concern. Not on meds.       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 Chronic condition, ongoing decline expected.   -  Continue to provide redirection and reassurance as needed. Maintain safe living situation with goals focused on comfort.  - progressively slow declining.   -Appreciate collaboration with  hospice team for symptom management in collaboration with cares for maximum comfort at end-of-life        Electronically signed by:  Aquiles Hernandez MD

## 2022-12-12 NOTE — LETTER
12/12/2022        RE: Sherri Gustafson  [address] [facility] [address]        [place] [facility]  Chief Complaint   Patient presents with     group home Regulatory     [place] Medical Record Number:  [id number]  Place of Service where encounter took place:  [facility] ([facility]) [[id number]]    HPI:    Sherri Bender  is 89 year old (6/6/1932), who is being seen today for a federally mandated E/M visit.  HPI information obtained from: facility staff and [facility] chart review.     Today's concerns are:   - Resident seen and examined, chart reviewed and discussed with the nursing staff.   - DNP and RN have no concern today.  Report still enrolled in hospice care, slow decline.     --------------------------------  - Past Medical, social, family histories, medications, and allergies reviewed and updated  - Medications reviewed: in the chart and EHR.   - Case Management:   I have reviewed the care plan and MDS and do agree with the plan. Patient's desire to return to the community is not present.  Information reviewed:  Medications, vital signs, orders, and nursing notes.    MEDICATIONS:  Current Outpatient Medications   Medication Sig Dispense Refill     atropine 1 % ophthalmic solution Place 2 drops under the tongue every 2 hours as needed       guaiFENesin (ROBITUSSIN) 100 MG/5ML SYRP Take 20 mLs by mouth every 4 hours as needed for cough       LORazepam (ATIVAN) 2 MG/ML (HIGH CONC) solution Take 0.25 mLs (0.5 mg) by mouth At Bedtime 30 mL 3     morphine sulfate 20 MG/5ML SOLN Take 0.25 mLs by mouth 2 times daily Take 0.25 mL by mouth every 1 hour as needed       polyethylene glycol (MIRALAX/GLYCOLAX) Packet Take 17 g by mouth daily       senna-docusate (SENOKOT-S;PERICOLACE) 8.6-50 MG per tablet Take 1 tablet by mouth 2 times daily       ROS: Unobtainable secondary to cognitive impairment.     Vitals:  /65   Pulse 83   Temp 97.1  F (36.2  C)    "Resp 16   Ht 1.6 m (5' 3\")   Wt 58.1 kg (128 lb)   SpO2 94%   BMI 22.67 kg/m    Body mass index is 22.67 kg/m .  Exam: performed today 12/12/22  GENERAL APPEARANCE:  Sitting up in the broda chair, very lethargic  RESP:  breathing unlabored.   NEURO:   No involuntary movements  Psych: very lethargic  Skin: thin and frail  Eyes: closed      Lab/Diagnostic data: no new data to review.       ASSESSMENT/PLAN  ---------------------------  Systolic congestive heart failure, unspecified congestive heart failure chronicity (H)  HTN, essential  - compensated. No concern. Not on meds.       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 Chronic condition, ongoing decline expected.   -  Continue to provide redirection and reassurance as needed. Maintain safe living situation with goals focused on comfort.  - progressively slow declining.   -Appreciate collaboration with  hospice team for symptom management in collaboration with cares for maximum comfort at end-of-life        Electronically signed by:  Aquiles Hernandez MD            Sincerely,        Aquiles Hernandez MD    "

## 2023-01-01 ENCOUNTER — PATIENT OUTREACH (OUTPATIENT)
Dept: GERIATRIC MEDICINE | Facility: CLINIC | Age: 88
End: 2023-01-01
Payer: COMMERCIAL

## 2023-01-01 ENCOUNTER — NURSING HOME VISIT (OUTPATIENT)
Dept: GERIATRICS | Facility: CLINIC | Age: 88
End: 2023-01-01
Payer: MEDICARE

## 2023-01-01 ENCOUNTER — NURSING HOME VISIT (OUTPATIENT)
Dept: GERIATRICS | Facility: CLINIC | Age: 88
End: 2023-01-01
Payer: OTHER MISCELLANEOUS

## 2023-01-01 ENCOUNTER — PATIENT OUTREACH (OUTPATIENT)
Dept: GERIATRIC MEDICINE | Facility: CLINIC | Age: 88
End: 2023-01-01

## 2023-01-01 ENCOUNTER — NURSING HOME VISIT (OUTPATIENT)
Dept: GERIATRICS | Facility: CLINIC | Age: 88
End: 2023-01-01

## 2023-01-01 VITALS
RESPIRATION RATE: 17 BRPM | OXYGEN SATURATION: 90 % | HEIGHT: 63 IN | HEART RATE: 76 BPM | WEIGHT: 127 LBS | DIASTOLIC BLOOD PRESSURE: 76 MMHG | TEMPERATURE: 97.6 F | BODY MASS INDEX: 22.5 KG/M2 | SYSTOLIC BLOOD PRESSURE: 138 MMHG

## 2023-01-01 VITALS
BODY MASS INDEX: 21.53 KG/M2 | RESPIRATION RATE: 20 BRPM | DIASTOLIC BLOOD PRESSURE: 60 MMHG | SYSTOLIC BLOOD PRESSURE: 97 MMHG | WEIGHT: 121.5 LBS | HEART RATE: 98 BPM | OXYGEN SATURATION: 98 % | HEIGHT: 63 IN | TEMPERATURE: 97.9 F

## 2023-01-01 VITALS
DIASTOLIC BLOOD PRESSURE: 74 MMHG | RESPIRATION RATE: 18 BRPM | HEIGHT: 63 IN | TEMPERATURE: 98.6 F | OXYGEN SATURATION: 95 % | BODY MASS INDEX: 21.53 KG/M2 | HEART RATE: 80 BPM | WEIGHT: 121.5 LBS | SYSTOLIC BLOOD PRESSURE: 107 MMHG

## 2023-01-01 VITALS
SYSTOLIC BLOOD PRESSURE: 81 MMHG | WEIGHT: 113.7 LBS | HEART RATE: 77 BPM | DIASTOLIC BLOOD PRESSURE: 58 MMHG | RESPIRATION RATE: 17 BRPM | HEIGHT: 63 IN | OXYGEN SATURATION: 95 % | BODY MASS INDEX: 20.14 KG/M2 | TEMPERATURE: 98.3 F

## 2023-01-01 VITALS
WEIGHT: 111.8 LBS | RESPIRATION RATE: 19 BRPM | DIASTOLIC BLOOD PRESSURE: 67 MMHG | HEIGHT: 63 IN | BODY MASS INDEX: 19.81 KG/M2 | TEMPERATURE: 98.5 F | HEART RATE: 78 BPM | OXYGEN SATURATION: 90 % | SYSTOLIC BLOOD PRESSURE: 108 MMHG

## 2023-01-01 DIAGNOSIS — I10 ESSENTIAL HYPERTENSION: ICD-10-CM

## 2023-01-01 DIAGNOSIS — G30.1 LATE ONSET ALZHEIMER'S DISEASE WITHOUT BEHAVIORAL DISTURBANCE (H): ICD-10-CM

## 2023-01-01 DIAGNOSIS — F41.9 ANXIETY: ICD-10-CM

## 2023-01-01 DIAGNOSIS — M15.0 PRIMARY OSTEOARTHRITIS INVOLVING MULTIPLE JOINTS: ICD-10-CM

## 2023-01-01 DIAGNOSIS — I50.22 CHRONIC SYSTOLIC CONGESTIVE HEART FAILURE (H): ICD-10-CM

## 2023-01-01 DIAGNOSIS — I50.22 CHRONIC SYSTOLIC CONGESTIVE HEART FAILURE (H): Primary | ICD-10-CM

## 2023-01-01 DIAGNOSIS — F02.80 LATE ONSET ALZHEIMER'S DISEASE WITHOUT BEHAVIORAL DISTURBANCE (H): ICD-10-CM

## 2023-01-01 DIAGNOSIS — Z51.5 HOSPICE CARE: Primary | ICD-10-CM

## 2023-01-01 DIAGNOSIS — F32.0 MILD MAJOR DEPRESSION (H): ICD-10-CM

## 2023-01-01 DIAGNOSIS — Z51.5 HOSPICE CARE: ICD-10-CM

## 2023-01-01 DIAGNOSIS — R54 FRAIL ELDERLY: ICD-10-CM

## 2023-01-01 DIAGNOSIS — K59.01 SLOW TRANSIT CONSTIPATION: ICD-10-CM

## 2023-01-01 DIAGNOSIS — Z00.00 ANNUAL PHYSICAL EXAM: Primary | ICD-10-CM

## 2023-01-01 DIAGNOSIS — F22 PARANOIA (H): ICD-10-CM

## 2023-01-01 PROCEDURE — 99308 SBSQ NF CARE LOW MDM 20: CPT | Mod: GW | Performed by: NURSE PRACTITIONER

## 2023-01-01 PROCEDURE — 99308 SBSQ NF CARE LOW MDM 20: CPT | Mod: GV | Performed by: FAMILY MEDICINE

## 2023-01-01 PROCEDURE — 99309 SBSQ NF CARE MODERATE MDM 30: CPT | Mod: GV | Performed by: NURSE PRACTITIONER

## 2023-01-01 RX ORDER — LORAZEPAM 2 MG/ML
0.5 CONCENTRATE ORAL 2 TIMES DAILY
Qty: 30 ML | Refills: 3
Start: 2023-01-01 | End: 2023-01-01

## 2023-01-01 RX ORDER — BISACODYL 10 MG
10 SUPPOSITORY, RECTAL RECTAL DAILY PRN
COMMUNITY
Start: 2023-01-01

## 2023-01-01 RX ORDER — LORAZEPAM 2 MG/ML
0.5 CONCENTRATE ORAL AT BEDTIME
Qty: 30 ML | Refills: 3
Start: 2023-01-01

## 2023-01-01 RX ORDER — ACETAMINOPHEN 325 MG/1
650 TABLET ORAL EVERY 4 HOURS PRN
COMMUNITY
Start: 2023-01-01

## 2023-02-02 NOTE — PROGRESS NOTES
"ealth Section Regulatory  Chief Complaint   Patient presents with     LISA Figueroa MRN: [id number] Place of Service where encounter took place:  [facility] () [[id number]] HPI: Sherri Bender  is 89 year old (6/6/1932), who is being seen today for a federally mandated E/M visit.  HPI information obtained from: facility chart records, facility staff, patient report, [facility] chart review, and Care Everywhere [facility] chart review. Today's concerns are:    Sherri seen today per federal mandate. She is non-verbal. During provider introduction and questions, Sherri makes eye contact, tries to mouth words, and squirms in her chair.     PMH/PSH, allergies reviewed in EPIC today.  MEDICATIONS:  Current Outpatient Medications   Medication Sig Dispense Refill     LORazepam (ATIVAN) 2 MG/ML (HIGH CONC) oral solution Take 0.25 mLs (0.5 mg) by mouth 2 times daily 30 mL 3     atropine 1 % ophthalmic solution Place 2 drops under the tongue every 2 hours as needed       guaiFENesin (ROBITUSSIN) 100 MG/5ML SYRP Take 20 mLs by mouth every 4 hours as needed for cough       morphine sulfate 20 MG/5ML SOLN Take 0.25 mLs by mouth 2 times daily Take 0.25 mL by mouth every 1 hour as needed       polyethylene glycol (MIRALAX/GLYCOLAX) Packet Take 17 g by mouth daily       senna-docusate (SENOKOT-S;PERICOLACE) 8.6-50 MG per tablet Take 1 tablet by mouth 2 times daily         ROS: Limited secondary to cognitive impairment but today pt reports the above.    Vitals: /76   Pulse 76   Temp 97.6  F (36.4  C)   Resp 17   Ht 1.6 m (5' 3\")   Wt 57.6 kg (127 lb)   SpO2 90%   BMI 22.50 kg/m    Exam:  GENERAL APPEARANCE: Alert, in no distress, cooperative.   EYES: EOM, conjunctivae, lids, pupils and irises normal, PERRL2.   RESP: Respiratory effort good, no respiratory distress, Lung sounds clear. On RA.   CV: Auscultation of heart reveals S1, S2, rate and rhythm regular, no murmur, no rub or gallop, Edema 0+ " BLE.  SKIN: Inspection/Palpation of skin and subcutaneous tissue baseline w/ fragility. No wounds/rashes noted.   PSYCH: Insight, judgement, and memory are impaired at baseline, affect and mood are flat/withdrawn.    Lab/Diagnostic data: Recent labs in [facility] reviewed by me today.     ASSESSMENT/PLAN  Late onset Alzheimer's disease without behavioral disturbance (H)  Chronic systolic congestive heart failure (H)  Mild major depression (H)  Hosp[facility] care- [facility]  Chronic. Ongoing.    Sherri has been enrolled in hospice services for several years, and therefore blood work or other work-ups are usually deferred given her goals of care.     Underlying dementia, superimposed on depression is advanced but does appear at baseline.    CHF appears at baseline and without exacerbation.   Follow up routinely or as needed.    Orders:  No new orders.    Electronically signed by:  Dr. Genia Titus, VALERIE CNP DNP

## 2023-02-02 NOTE — LETTER
"    2/2/2023        RE: Sherri Bneder  [facility]o [facility]  [address]  [place] [address]        [place] [facility] [place] [facility]  Chief Complaint   Patient presents with     LISA Figueroa MRN: [id number] Place of Service where encounter took place:  [facility] () [[id number]] HPI: Sherri Bender  is 89 year old (6/6/1932), who is being seen today for a federally mandated E/M visit.  HPI information obtained from: facility chart records, facility staff, patient report, [facility] chart review, and Care Everywhere [facility] chart review. Today's concerns are:    Sherri seen today per federal mandate. She is non-verbal. During provider introduction and questions, Sherri makes eye contact, tries to mouth words, and squirms in her chair.     PMH/PSH, allergies reviewed in EPIC today.  MEDICATIONS:  Current Outpatient Medications   Medication Sig Dispense Refill     LORazepam (ATIVAN) 2 MG/ML (HIGH CONC) oral solution Take 0.25 mLs (0.5 mg) by mouth 2 times daily 30 mL 3     atropine 1 % ophthalmic solution Place 2 drops under the tongue every 2 hours as needed       guaiFENesin (ROBITUSSIN) 100 MG/5ML SYRP Take 20 mLs by mouth every 4 hours as needed for cough       morphine sulfate 20 MG/5ML SOLN Take 0.25 mLs by mouth 2 times daily Take 0.25 mL by mouth every 1 hour as needed       polyethylene glycol (MIRALAX/GLYCOLAX) Packet Take 17 g by mouth daily       senna-docusate (SENOKOT-S;PERICOLACE) 8.6-50 MG per tablet Take 1 tablet by mouth 2 times daily         ROS: Limited secondary to cognitive impairment but today pt reports the above.    Vitals: /76   Pulse 76   Temp 97.6  F (36.4  C)   Resp 17   Ht 1.6 m (5' 3\")   Wt 57.6 kg (127 lb)   SpO2 90%   BMI 22.50 kg/m    Exam:  GENERAL APPEARANCE: Alert, in no distress, cooperative.   EYES: EOM, conjunctivae, lids, pupils and irises normal, PERRL2.   RESP: Respiratory effort good, no respiratory " distress, Lung sounds clear. On RA.   CV: Auscultation of heart reveals S1, S2, rate and rhythm regular, no murmur, no rub or gallop, Edema 0+ BLE.  SKIN: Inspection/Palpation of skin and subcutaneous tissue baseline w/ fragility. No wounds/rashes noted.   PSYCH: Insight, judgement, and memory are impaired at baseline, affect and mood are flat/withdrawn.    Lab/Diagnostic data: Recent labs in [facility] reviewed by me today.     ASSESSMENT/PLAN  Late onset Alzheimer's disease without behavioral disturbance (H)  Chronic systolic congestive heart failure (H)  Mild major depression (H)  Hospice care- [facility]  Chronic. Ongoing.    Sherri has been enrolled in hospice services for several years, and therefore blood work or other work-ups are usually deferred given her goals of care.     Underlying dementia, superimposed on depression is advanced but does appear at baseline.    CHF appears at baseline and without exacerbation.   Follow up routinely or as needed.    Orders:  No new orders.    Electronically signed by:  VALERIE Solano CNP DNP          Sincerely,        VALERIE Howard CNP

## 2023-04-10 NOTE — LETTER
"    4/10/2023        RE: Sherri Bender  [facility]  [address]  [place] [address]        [place] [facility]  Chief Complaint   Patient presents with     group home Regulatory     [place] Medical Record Number:  [id number]  [id number]    HPI:    Sherri Bender  is 89 year old (6/6/1932), who is being seen today for a federally mandated E/M visit.  HPI information obtained from: facility staff and [facility] chart review.     Today's concerns are:   - Resident seen and examined, chart reviewed and discussed with the nursing staff.   - DNP and RN have no concern today.      --------------------------------  - Past Medical, social, family histories, medications, and allergies reviewed and updated    MEDICATIONS:  Current Outpatient Medications   Medication Sig Dispense Refill     atropine 1 % ophthalmic solution Place 2 drops under the tongue every 2 hours as needed       guaiFENesin (ROBITUSSIN) 100 MG/5ML SYRP Take 20 mLs by mouth every 4 hours as needed for cough       LORazepam (ATIVAN) 2 MG/ML (HIGH CONC) oral solution Take 0.25 mLs (0.5 mg) by mouth 2 times daily 30 mL 3     morphine sulfate 20 MG/5ML SOLN Take 0.25 mLs by mouth 2 times daily Take 0.25 mL by mouth every 1 hour as needed       polyethylene glycol (MIRALAX/GLYCOLAX) Packet Take 17 g by mouth daily       senna-docusate (SENOKOT-S;PERICOLACE) 8.6-50 MG per tablet Take 1 tablet by mouth 2 times daily       ROS: Unobtainable secondary to cognitive impairment.     Vitals:  /74   Pulse 80   Temp 98.6  F (37  C)   Resp 18   Ht 1.6 m (5' 3\")   Wt 55.1 kg (121 lb 8 oz)   SpO2 95%   BMI 21.52 kg/m    Body mass index is 21.52 kg/m .  Exam:   GENERAL APPEARANCE:  Sitting up in the broda chair, very lethargic  RESP:  breathing unlabored.   NEURO:   No involuntary movements  Psych: very lethargic  Skin: " thin and frail  Eyes: closed      Lab/Diagnostic data: no new data to review.       ASSESSMENT/PLAN  ---------------------------  Systolic congestive heart failure, unspecified congestive heart failure chronicity (H)  HTN, essential  - compensated. No concern. Not on meds.       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 Chronic condition, ongoing decline expected.   -  Continue to provide redirection and reassurance as needed. Maintain safe living situation with goals focused on comfort.  - progressively slow declining.   -Appreciate collaboration with  hospice team for symptom management in collaboration with cares for maximum comfort at end-of-life        Electronically signed by:  Aquiles Hernandez MD            Sincerely,        Aquiles Hernandez MD

## 2023-04-10 NOTE — PROGRESS NOTES
"[place] [facility]  Chief Complaint   Patient presents with     long term Regulatory     [place] Medical Record Number:  [id number]  Place of Service where encounter took place:  [facility] () [[id number]]    HPI:    Sherri Bender  is 89 year old (6/6/1932), who is being seen today for a federally mandated E/M visit.  HPI information obtained from: facility staff and [facility] chart review.     Today's concerns are:   - Resident seen and examined, chart reviewed and discussed with the nursing staff.   - DNP and RN have no concern today.      --------------------------------  - Past Medical, social, family histories, medications, and allergies reviewed and updated    MEDICATIONS:  Current Outpatient Medications   Medication Sig Dispense Refill     atropine 1 % ophthalmic solution Place 2 drops under the tongue every 2 hours as needed       guaiFENesin (ROBITUSSIN) 100 MG/5ML SYRP Take 20 mLs by mouth every 4 hours as needed for cough       LORazepam (ATIVAN) 2 MG/ML (HIGH CONC) oral solution Take 0.25 mLs (0.5 mg) by mouth 2 times daily 30 mL 3     morphine sulfate 20 MG/5ML SOLN Take 0.25 mLs by mouth 2 times daily Take 0.25 mL by mouth every 1 hour as needed       polyethylene glycol (MIRALAX/GLYCOLAX) Packet Take 17 g by mouth daily       senna-docusate (SENOKOT-S;PERICOLACE) 8.6-50 MG per tablet Take 1 tablet by mouth 2 times daily       ROS: Unobtainable secondary to cognitive impairment.     Vitals:  /74   Pulse 80   Temp 98.6  F (37  C)   Resp 18   Ht 1.6 m (5' 3\")   Wt 55.1 kg (121 lb 8 oz)   SpO2 95%   BMI 21.52 kg/m    Body mass index is 21.52 kg/m .  Exam:   GENERAL APPEARANCE:  Sitting up in the broda chair, very lethargic  RESP:  breathing unlabored.   NEURO:   No involuntary movements  Psych: very lethargic  Skin: thin and frail  Eyes: closed      Lab/Diagnostic data: no new data to review.       ASSESSMENT/PLAN  ---------------------------  Systolic " congestive heart failure, unspecified congestive heart failure chronicity (H)  HTN, essential  - compensated. No concern. Not on meds.       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 Chronic condition, ongoing decline expected.   -  Continue to provide redirection and reassurance as needed. Maintain safe living situation with goals focused on comfort.  - progressively slow declining.   -Appreciate collaboration with  hospice team for symptom management in collaboration with cares for maximum comfort at end-of-life        Electronically signed by:  Aquiles Hernandez MD

## 2023-06-01 NOTE — PROGRESS NOTES
"[facility] Regulatory  Chief Complaint   Patient presents with     [facility] MRN: [id number] Place of Service where encounter took place:  [facility] () [[id number]] HPI: Sherri Bender  is 89 year old (6/6/1932), who is being seen today for a federally mandated E/M visit.  HPI information obtained from: facility chart records, facility staff, patient report, [facility] chart review, and Care Everywhere [facility] chart review. Today's concerns are:    Sherri seen today per federal mandate. She remains non-verbal, and allows for full exam.    PMH/PSH, allergies reviewed in EPIC today.  MEDICATIONS:  Current Outpatient Medications   Medication Sig Dispense Refill     atropine 1 % ophthalmic solution Place 2 drops under the tongue every 2 hours as needed       guaiFENesin (ROBITUSSIN) 100 MG/5ML SYRP Take 20 mLs by mouth every 4 hours as needed for cough       LORazepam (ATIVAN) 2 MG/ML (HIGH CONC) oral solution Take 0.25 mLs (0.5 mg) by mouth 2 times daily 30 mL 3     morphine sulfate 20 MG/5ML SOLN Take 0.25 mLs by mouth 2 times daily Take 0.25 mL by mouth every 1 hour as needed       polyethylene glycol (MIRALAX/GLYCOLAX) Packet Take 17 g by mouth daily       senna-docusate (SENOKOT-S;PERICOLACE) 8.6-50 MG per tablet Take 1 tablet by mouth 2 times daily         ROS: Unobtainable secondary to cognitive impairment and secondary to aphasia.     Vitals: BP 97/60   Pulse 98   Temp 97.9  F (36.6  C)   Resp 20   Ht 1.6 m (5' 3\")   Wt 55.1 kg (121 lb 8 oz)   SpO2 98%   BMI 21.52 kg/m    Exam:  GENERAL APPEARANCE: drowsy, in no distress, semi-cooperative. .   RESP: Respiratory effort good, no respiratory distress, Lung sounds clear. On RA.   CV: Auscultation of heart reveals S1, S2, rate and rhythm regular, no murmur, no rub or gallop, Edema 0+ BLE.  SKIN: Inspection/Palpation of skin and subcutaneous tissue baseline w/ fragility. No wounds/rashes noted.   PSYCH: Insight, " judgement, and memory are baseline impaired, affect and mood are flat/withdrawn.    Lab/Diagnostic data: Recent labs in EPIC reviewed by me today.     ASSESSMENT/PLAN  Chronic systolic congestive heart failure (H)  Late onset Alzheimer's disease without behavioral disturbance (H)  Anxiety  Mild major depression (H)  Frail elderly  Hospice care- [facility]  Chronic. Ongoing.    Provider reviewed plan of care and appreciate PharmD recommendation for timed trial reduction in Lorazepam.     Not overt anxiety or behaviors are noted, chart review shows no behavioral concerns from staff. Lorazepam is a hospice order, but will trial as suggested. Nursing and hospice to monitor for effect.     CHF appears to be well controlled w/o SOB or edema/weight gain.     Alzheimer's certainly causing difficulty in communication and overall debility.   Follow up routinely or as needed.    Orders:  1. Decrease Lorazepam to 0.5mg PO at bedtime. Dx: pain/anxiety.     Electronically signed by:  Dr. Genia Titus, APRN CNP DNP

## 2023-06-01 NOTE — LETTER
"    6/1/2023        RE: Sherri Bender  [facility]  [address]  [place] [address]        [facility] Regulatory  Chief Complaint   Patient presents with     long term Regulatory   [place] MRN: [id number] Place of Service where encounter took place:  [facility] () [[id number]] HPI: Sherri Bender  is 89 year old (6/6/1932), who is being seen today for a federally mandated E/M visit.  HPI information obtained from: facility chart records, facility staff, patient report, [facility] chart review, and Care Everywhere [facility] chart review. Today's concerns are:    Sherri seen today per federal mandate. She remains non-verbal, and allows for full exam.    PMH/PSH, allergies reviewed in [facility] today.  MEDICATIONS:  Current Outpatient Medications   Medication Sig Dispense Refill     atropine 1 % ophthalmic solution Place 2 drops under the tongue every 2 hours as needed       guaiFENesin (ROBITUSSIN) 100 MG/5ML SYRP Take 20 mLs by mouth every 4 hours as needed for cough       LORazepam (ATIVAN) 2 MG/ML (HIGH CONC) oral solution Take 0.25 mLs (0.5 mg) by mouth 2 times daily 30 mL 3     morphine sulfate 20 MG/5ML SOLN Take 0.25 mLs by mouth 2 times daily Take 0.25 mL by mouth every 1 hour as needed       polyethylene glycol (MIRALAX/GLYCOLAX) Packet Take 17 g by mouth daily       senna-docusate (SENOKOT-S;PERICOLACE) 8.6-50 MG per tablet Take 1 tablet by mouth 2 times daily         ROS: Unobtainable secondary to cognitive impairment and secondary to aphasia.     Vitals: BP 97/60   Pulse 98   Temp 97.9  F (36.6  C)   Resp 20   Ht 1.6 m (5' 3\")   Wt 55.1 kg (121 lb 8 oz)   SpO2 98%   BMI 21.52 kg/m    Exam:  GENERAL APPEARANCE: drowsy, in no distress, semi-cooperative. .   RESP: Respiratory effort good, no respiratory distress, Lung sounds clear. On RA.   CV: Auscultation of heart reveals S1, S2, rate and rhythm regular, no murmur, no rub or gallop, " Edema 0+ BLE.  SKIN: Inspection/Palpation of skin and subcutaneous tissue baseline w/ fragility. No wounds/rashes noted.   PSYCH: Insight, judgement, and memory are baseline impaired, affect and mood are flat/withdrawn.    Lab/Diagnostic data: [facility] labs in [facility] reviewed by me today.     ASSESSMENT/PLAN  Chronic systolic congestive heart failure (H)  Late onset Alzheimer's disease without behavioral disturbance (H)  Anxiety  Mild major depression (H)  Frail elderly  Hospice care- [facility]. Ongoing.    Provider reviewed plan of care and appreciate PharmD recommendation for timed trial reduction in Lorazepam.     Not overt anxiety or behaviors are noted, chart review shows no behavioral concerns from staff. Lorazepam is a hospice order, but will trial as suggested. Nursing and hospice to monitor for effect.     CHF appears to be well controlled w/o SOB or edema/weight gain.     Alzheimer's certainly causing difficulty in communication and overall debility.   Follow up routinely or as needed.    Orders:  1. Decrease Lorazepam to 0.5mg PO at bedtime. Dx: pain/anxiety.     Electronically signed by:  VALERIE Solano CNP [facility]          Sincerely,        VALERIE Howard CNP

## 2023-06-12 NOTE — PROGRESS NOTES
[facility] Six-Month Assessment    6 month assessment completed on 6/12/2023 with Cassie RUIZ.    ER visits: No  Hospitalizations: No  TCU stays: No  Significant health status changes: no continues on hospice  Falls/Injuries: No  ADL/IADL changes: No    Reviewed Institutional Assessment and updated as needed.     Will see member in 6 months for an annual health risk assessment.     Danni Feliciano RN  Care Coordinator-Long Term Care  [address] [address]  [email or web address]   [email or web address]     Office: [phone]   Fax: [phone]

## 2023-08-11 NOTE — PROGRESS NOTES
"[place] G[facility]IATRIC SERVICES  Chief Complaint   Patient presents with    jail Regulatory     [place] Medical Record Number:  [id number]  Place of Service where encounter took place:  [facility] () [[id number]]    HPI:    Sherri Bender  is 89 year old (6/6/1932), who is being seen today for a federally mandated E/M visit.  HPI information obtained from: facility staff and [facility] chart review.     Today's concerns are:   - Resident seen and examined, chart reviewed and discussed with the nursing staff.   - DNP and RN have no concern today.      --------------------------------  - Past Medical, social, family histories, medications, and allergies reviewed and updated    MEDICATIONS:  Current Outpatient Medications   Medication Sig Dispense Refill    atropine 1 % ophthalmic solution Place 2 drops under the tongue every 2 hours as needed      guaiFENesin (ROBITUSSIN) 100 MG/5ML SYRP Take 20 mLs by mouth every 4 hours as needed for cough      LORazepam (ATIVAN) 2 MG/ML (HIGH CONC) oral solution Take 0.25 mLs (0.5 mg) by mouth At Bedtime 30 mL 3    morphine sulfate 20 MG/5ML SOLN Take 0.25 mLs by mouth 2 times daily Take 0.25 mL by mouth every 1 hour as needed      polyethylene glycol (MIRALAX/GLYCOLAX) Packet Take 17 g by mouth daily      senna-docusate (SENOKOT-S;PERICOLACE) 8.6-50 MG per tablet Take 1 tablet by mouth 2 times daily       ROS: Unobtainable secondary to cognitive impairment.     Vitals:  /67   Pulse 78   Temp 98.5  F (36.9  C)   Resp 19   Ht 1.6 m (5' 3\")   Wt 50.7 kg (111 lb 12.8 oz)   SpO2 90%   BMI 19.80 kg/m    Body mass index is 19.8 kg/m .  Exam:   GENERAL APPEARANCE:  Sitting up in the broda chair, very lethargic  RESP:  breathing unlabored.   NEURO:   No involuntary movements  Psych: very lethargic  Skin: thin and frail  Eyes: closed      Lab/Diagnostic data: no new data to review.       ASSESSMENT/PLAN  ---------------------------  Systolic congestive " heart failure, unspecified congestive heart failure chronicity (H)  HTN, essential  - compensated. No concern. Not on meds.       Alzheimer's disease of other onset without behavioral disturbance (H)  Paranoia (H)  [facility]  - Continue to anticipate needs. Chronic condition, ongoing decline expected.   -  Continue to provide redirection and reassurance as needed. Maintain safe living situation with goals focused on comfort.  - progressively slow declining.   -Appreciate collaboration with  hospice team for symptom management in collaboration with cares for maximum comfort at end-of-life        Electronically signed by:  Aquiles Hernandez MD

## 2023-08-14 NOTE — LETTER
"    8/14/2023        RE: Sherri Bender  C/o [facility]  [address]  [place] [address]        [place] [facility]ATR[facility]  Chief Complaint   Patient presents with     snf Regulatory     [place] Medical Record Number:  [id number]  [id number]    HPI:    Sherri Bender  is 89 year old (6/6/1932), who is being seen today for a federally mandated E/M visit.  HPI information obtained from: facility staff and [facility] chart review.     Today's concerns are:   - Resident seen and examined, chart reviewed and discussed with the nursing staff.   - DNP and RN have no concern today.      --------------------------------  - Past Medical, social, family histories, medications, and allergies reviewed and updated    MEDICATIONS:  Current Outpatient Medications   Medication Sig Dispense Refill     atropine 1 % ophthalmic solution Place 2 drops under the tongue every 2 hours as needed       guaiFENesin (ROBITUSSIN) 100 MG/5ML SYRP Take 20 mLs by mouth every 4 hours as needed for cough       LORazepam (ATIVAN) 2 MG/ML (HIGH CONC) oral solution Take 0.25 mLs (0.5 mg) by mouth At Bedtime 30 mL 3     morphine sulfate 20 MG/5ML SOLN Take 0.25 mLs by mouth 2 times daily Take 0.25 mL by mouth every 1 hour as needed       polyethylene glycol (MIRALAX/GLYCOLAX) Packet Take 17 g by mouth daily       senna-docusate (SENOKOT-S;PERICOLACE) 8.6-50 MG per tablet Take 1 tablet by mouth 2 times daily       ROS: Unobtainable secondary to cognitive impairment.     Vitals:  /67   Pulse 78   Temp 98.5  F (36.9  C)   Resp 19   Ht 1.6 m (5' 3\")   Wt 50.7 kg (111 lb 12.8 oz)   SpO2 90%   BMI 19.80 kg/m    Body mass index is 19.8 kg/m .  Exam:   GENERAL APPEARANCE:  Sitting up in the broda chair, very lethargic  RESP:  breathing unlabored.   NEURO:   No involuntary movements  Psych: very lethargic  Skin: " thin and frail  Eyes: closed      Lab/Diagnostic data: no new data to review.       ASSESSMENT/PLAN  ---------------------------  Systolic congestive heart failure, unspecified congestive heart failure chronicity (H)  HTN, essential  - compensated. No concern. Not on meds.       Alzheimer's disease of other onset without behavioral disturbance (H)  Paranoia (H)  [facility]  - Continue to anticipate needs. Chronic condition, ongoing decline expected.   -  Continue to provide redirection and reassurance as needed. Maintain safe living situation with goals focused on comfort.  - progressively slow declining.   -Appreciate collaboration with  hospice team for symptom management in collaboration with cares for maximum comfort at end-of-life        Electronically signed by:  Aquiles Hernandez MD        Sincerely,        Aquiles Hernandez MD

## 2023-10-11 NOTE — PROGRESS NOTES
[facility] GERIATRICS  Chief Complaint   Patient presents with    Annual Comprehensive Nursing Home     [place] Medical Record Number:  [id number]  Place of Service where encounter took place:  [facility] () [[id number]]    HPI:    Sherri Bender  is a 89 year old  (6/6/1932), who is being seen today for an annual comprehensive visit. HPI information obtained from: facility chart records, facility staff, patient report, and [facility] chart review.     She is currently on hospice with advanced dementia. She is non verbal, did not make eye contact with provider. Staff report no recent concerns. She did attend chapel services earlier this AM. Staff report no recent concerns, continues to declined. Needs assistance with all cares.     ALLERGIES: Patient has no known allergies.  PAST MEDICAL HISTORY:   Past Medical History:   Diagnosis Date    Dementia (H)     Depression     Environmental allergies     spring, fall    Family history of ischemic heart disease 2/9/2010    HTN (hypertension)     Osteoarthritis       PAST SURGICAL HISTORY:  has a past surgical history that includes appendectomy; surgical history of - ; surgical history of - ; tonsillectomy; cataract iol, rt/lt (4/2010); and Colonoscopy (6/20/2011).      Current Outpatient Medications:     acetaminophen (TYLENOL) 325 MG tablet, Take 2 tablets (650 mg) by mouth every 4 hours as needed for mild pain, Disp: , Rfl:     bisacodyl (DULCOLAX) 10 MG suppository, Place 1 suppository (10 mg) rectally daily as needed for constipation, Disp: , Rfl:     atropine 1 % ophthalmic solution, Place 2 drops under the tongue every 2 hours as needed, Disp: , Rfl:     LORazepam (ATIVAN) 2 MG/ML (HIGH CONC) oral solution, Take 0.25 mLs (0.5 mg) by mouth At Bedtime, Disp: 30 mL, Rfl: 3    morphine sulfate 20 MG/5ML SOLN, Take 0.25 mLs by mouth 2 times daily Take 0.25 mL by mouth every 1 hour as needed, Disp: , Rfl:     polyethylene glycol (MIRALAX/GLYCOLAX)  "Packet, Take 17 g by mouth daily, Disp: , Rfl:     senna-docusate (SENOKOT-S;PERICOLACE) 8.6-50 MG per tablet, Take 1 tablet by mouth 2 times daily, Disp: , Rfl:      MED REC REQUIRED  Post Medication Reconciliation Status: patient was not discharged from an inpatient facility or TCU      Case Management:  I have reviewed the facility/SNF care plan/MDS, including the falls risk, nutrition and pain screening. I also reviewed the current immunizations, and preventive care.. Future cancer screening is not clinically indicated secondary to age/goals of care. Patient's desire to return to the community is not assessible due to cognitive impairment. Current Level of Care is appropriate.mhgeroimmunization: Annual Influenza per facility protocol    Advance Directive Discussion:    I reviewed the current advanced directives as reflected in EPIC, the POLST and the facility chart, and verified the congruency of orders . Hospice contacted the first party  and discussed the plan of care. I did not due to cognitive impairment review the advance directives with the resident.     Team Discussion:  I communicated with the appropriate disciplines involved with the Plan of Care: Nursing   and   .   Patient's goal is: unobtainable secondary to cognitive impairment and pain control and comfort.  Information reviewed: Medications, vital signs, orders, and nursing notes.    ROS:  Unobtainable secondary to cognitive impairment.     Vitals:  BP (!) 81/58   Pulse 77   Temp 98.3  F (36.8  C)   Resp 17   Ht 1.6 m (5' 3\")   Wt 51.6 kg (113 lb 11.2 oz)   SpO2 95%   BMI 20.14 kg/m   Body mass index is 20.14 kg/m .  Exam:  GENERAL APPEARANCE:  Alert, in no distress,   RESP:  lungs clear to auscultation , no respiratory distress   CV:  regular rate and rhythm, no murmur, rub, or gallop, no edema  ABDOMEN:  bowel sounds normal,   M/S:   non ambulatory  NEURO:   Cn 2-12 grossly intact, confused  PSYCH:  flat affect   "     Lab/Diagnostic data:   Recent labs in [facility] reviewed by me today.     ASSESSMENT/PLAN  Annual physical exam  Chronic systolic congestive heart failure (H)  Essential hypertension  Appears compensated, Bp variable 80-110s, HR 60-80s  - continue current comfort cares      Late onset Alzheimer's disease without behavioral disturbance (H)  Paranoia (H)  Anxiety  Mild major depression (H24)  Frail elderly  Hospice care  Advanced, non verbal. Needs assistance with all ADLs and cares. Continues to decline, Some restless and agitation, responds well to medications  - continue current hospice cares, appreciate their assistance.   - continue 24/7 skilled nursing cares  - continue current comfort medication .        Slow transit constipation  + BM  - continue miralax daily, supp PRN    Primary osteoarthritis involving multiple joints  Chronic   - APAP PRN, morphine BID and PRN  - supportive cares        Orders:  NNO    Electronically signed by:  VALERIE Bob CNP

## 2023-10-11 NOTE — LETTER
10/11/2023        RE: Sherri Bender  C/o [facility]  [address]  [place] [address]        [facility] GERIATRICS  Chief Complaint   Patient presents with     Annual Comprehensive Nursing Home     [place] Medical Record Number:  [id number]  [facility] [[id number]    HPI:    Sherri Bender  is a 89 year old  (6/6/1932), who is being seen today for an annual comprehensive visit. HPI information obtained from: facility chart records, facility staff, patient report, and [facility] chart review.     She is currently on hospice with advanced dementia. She is non verbal, did not make eye contact with provider. Staff report no recent concerns. She did attend chapel services earlier this AM. Staff report no recent concerns, continues to declined. Needs assistance with all cares.     ALLERGIES: Patient has no known allergies.  PAST MEDICAL HISTORY:   Past Medical History:   Diagnosis Date     Dementia (H)      Depression      Environmental allergies     spring, fall     Family history of ischemic heart disease 2/9/2010     HTN (hypertension)      Osteoarthritis       PAST SURGICAL HISTORY:  has a past surgical history that includes appendectomy; surgical history of - ; surgical history of - ; tonsillectomy; cataract iol, rt/lt (4/2010); and Colonoscopy (6/20/2011).      Current Outpatient Medications:      acetaminophen (TYLENOL) 325 MG tablet, Take 2 tablets (650 mg) by mouth every 4 hours as needed for mild pain, Disp: , Rfl:      bisacodyl (DULCOLAX) 10 MG suppository, Place 1 suppository (10 mg) rectally daily as needed for constipation, Disp: , Rfl:      atropine 1 % ophthalmic solution, Place 2 drops under the tongue every 2 hours as needed, Disp: , Rfl:      LORazepam (ATIVAN) 2 MG/ML (HIGH CONC) oral solution, Take 0.25 mLs (0.5 mg) by mouth At Bedtime, Disp: 30 mL, Rfl: 3     morphine  "sulfate 20 MG/5ML SOLN, Take 0.25 mLs by mouth 2 times daily Take 0.25 mL by mouth every 1 hour as needed, Disp: , Rfl:      polyethylene glycol (MIRALAX/GLYCOLAX) Packet, Take 17 g by mouth daily, Disp: , Rfl:      senna-docusate (SENOKOT-S;PERICOLACE) 8.6-50 MG per tablet, Take 1 tablet by mouth 2 times daily, Disp: , Rfl:      MED REC REQUIRED  Post Medication Reconciliation Status: patient was not discharged from an inpatient facility or TCU      Case Management:  I have reviewed the facility/SNF care plan/MDS, including the falls risk, nutrition and pain screening. I also reviewed the current immunizations, and preventive care.. Future cancer screening is not clinically indicated secondary to age/goals of care. Patient's desire to return to the community is not assessible due to cognitive impairment. Current Level of Care is appropriate.mhgeroimmunization: Annual Influenza per facility protocol    Advance Directive Discussion:    I reviewed the current advanced directives as reflected in EPIC, the POLST and the facility chart, and verified the congruency of orders . Hospice contacted the first party  and discussed the plan of care. I did not due to cognitive impairment review the advance directives with the resident.     Team Discussion:  I communicated with the appropriate disciplines involved with the Plan of Care: Nursing   and   .   Patient's goal is: unobtainable secondary to cognitive impairment and pain control and comfort.  Information reviewed: Medications, vital signs, orders, and nursing notes.    ROS:  Unobtainable secondary to cognitive impairment.     Vitals:  BP (!) 81/58   Pulse 77   Temp 98.3  F (36.8  C)   Resp 17   Ht 1.6 m (5' 3\")   Wt 51.6 kg (113 lb 11.2 oz)   SpO2 95%   BMI 20.14 kg/m   Body mass index is 20.14 kg/m .  Exam:  GENERAL APPEARANCE:  Alert, in no distress,   RESP:  lungs clear to auscultation , no respiratory distress   CV:  regular rate and rhythm, no " murmur, rub, or gallop, no edema  ABDOMEN:  bowel sounds normal,   M/S:   non ambulatory  NEURO:   Cn 2-12 grossly intact, confused  PSYCH:  flat affect       Lab/Diagnostic data:   Recent labs in [facility] reviewed by me today.     ASSESSMENT/PLAN  Annual physical exam  Chronic systolic congestive heart failure (H)  Essential hypertension  Appears compensated, Bp variable 80-110s, HR 60-80s  - continue current comfort cares      Late onset Alzheimer's disease without behavioral disturbance (H)  Paranoia (H)  Anxiety  Mild major depression (H24)  Frail elderly  Hospice care  Advanced, non verbal. Needs assistance with all ADLs and cares. Continues to decline, Some restless and agitation, responds well to medications  - continue current hospice cares, appreciate their assistance.   - continue 24/7 skilled nursing cares  - continue current comfort medication .        Slow transit constipation  + BM  - continue miralax daily, supp PRN    Primary osteoarthritis involving multiple joints  Chronic   - APAP PRN, morphine BID and PRN  - supportive cares        Orders:  NNO    Electronically signed by:  VALERIE Bob CNP             Sincerely,        VALERIE Bob CNP

## 2023-10-17 NOTE — PROGRESS NOTES
"[facility] GERIATRIC SERVICE  Episodic/Acute/Follow-Up  [place] MRN: [id number]. Place of Service where encounter took place:  [facility] () [[id number]]   Chief Complaint   Patient presents with     RECHECK    HPI: Sherri Bender  is a 89 year old (6/6/1932), who is being seen today for an episodic care visit.  HPI information obtained from: facility chart records, facility staff, patient report and [facility] chart review. Today's concern is:    Sherri seen today after provider overheard her coughing in the day room.  She is status post rhinovirus confirmed on lab work, and during our previous visit she was very sick, and thought to have been transitioning with the help of hospice services.    Today, Sherri makes facial expressions to stimuli, but cannot engage in HPI/ROS.  Nursing states that they had thought she was transitioning also, but she appears to have rallied.  Her very productive cough is difficult for her to clear, and they do not note any sputum being expectorated.    Past Medical and Surgical History reviewed in Epic today.  MEDICATIONS:  Current Outpatient Medications   Medication Sig Dispense Refill     atropine 1 % ophthalmic solution Place 2 drops under the tongue every 2 hours as needed       guaiFENesin (ROBITUSSIN) 100 MG/5ML SYRP Take 20 mLs by mouth every 4 hours as needed for cough       LORazepam (ATIVAN) 2 MG/ML (HIGH CONC) solution Take 0.25 mLs (0.5 mg) by mouth At Bedtime 30 mL 3     morphine sulfate 20 MG/5ML SOLN Take 0.25 mLs by mouth 2 times daily Take 0.25 mL by mouth every 1 hour as needed       polyethylene glycol (MIRALAX/GLYCOLAX) Packet Take 17 g by mouth daily       senna-docusate (SENOKOT-S;PERICOLACE) 8.6-50 MG per tablet Take 1 tablet by mouth 2 times daily       REVIEW OF SYSTEMS: Unobtainable secondary to cognitive impairment.     Objective: BP 91/61   Pulse 88   Temp 98  F (36.7  C)   Resp 16   Ht 1.6 m (5' 3\")   Wt 59.6 kg (131 lb 6.4 oz)   " [place] reviewed.   Please call patient with the following changes:    Increase Humalog 10 with meals plus add 2:50>150   Continue Semglee 30 nightly, glimepiride 4 bid, Metformin 500 twice daily, Januvia 100 mg daily  Ask him if he called insurance to check on coverage for any of the medications asked him to check coverage for (GLP-1's) SpO2 94%   BMI 23.28 kg/m    Exam:  GENERAL APPEARANCE: Alert, in no distress, cooperative.   ENT: Mouth/posterior oropharynx intact w/ moist mucous membranes, hearing acuity [place].  EYES: EOM, conjunctivae, lids, pupils and irises normal, PERRL2.   RESP: Respiratory effort fair, no respiratory distress, Lung sounds clear anteriorly. On RA.   CV: Auscultation of heart reveals S1, S2, rate and rhythm regular, no murmur, no rub or gallop.  SKIN: Inspection/Palpation of skin and subcutaneous tissue baseline w/ fragility. No wounds/rashes noted.   PSYCH: Insight, judgement, and memory are baseline impaired, affect and mood are impaired/withdrawn.    Labs: Labs done in facility are in EPIC. Please refer to them using [employer]/Care Everywhere.    ASSESSMENT/PLAN:  Productive cough  Post-nasal drip  Post viral debility  Chronic systolic congestive heart failure (H)  Other Alzheimer's disease (CODE) (H)  Frail elderly  Hospice care- [facility]  Acute on subacute, on chronic. Ongoing.    Sherri is working with [facility], but noting that there is no medication available for secretions.  We will add as needed atropine drops sublingually.    Will add Mucinex for 1 week to help Sherri expel her secretions if they cannot be dried up.    Will also help with Zyrtec for 7 days, as this may help with postnasal drip and secretion management.    Lungs are clear, and this does not appear to be a CHF exacerbation.    Sherri is nonverbal given Alzheimer's disease, and therefore exam/presentation is somewhat limited.  Hospice to continue their follow-up accordingly.  Follow up routinely or as needed.    Orders:  1. Zyrtec 5mg PO QAM x 7 days. Dx: post-nasal drip  2. Mucinex 400mg PO BID x 7 days. Dx: cough.   3. Atropine 1% opth gtt, 2gtts SL Q2h PRN. Dx: increased secretions.     Electronically signed by:  Dr. Genia Titus, APRN CNP DNP

## 2023-10-19 NOTE — PROGRESS NOTES
[facility] Care Coordination Contact:       Emailed Thais WATSON at NH and NP via [employer] regarding my upcoming visit to facility on 10/24.    * no health changes, guardian has initiated some visits from her son/granddaughter, currently on hospice.      RNCC will visit Member on 10/24      Danni Feliciano RN  Care Coordinator-Long Term Care  [address] [address]  [email or web address]   [email or web address]     Office: [phone]   Fax: [phone]

## 2023-10-26 NOTE — PROGRESS NOTES
"[facility] Institutional Assessment     Institutional Assessment for Health Risk Assessment with Sherri Bender completed on October 26th 2023 at [facility]    Type of residence:: Nursing home  Current living arrangement:: I live in a nursing home     Assessment completed with:: Care Team Member, PETRA-chart review      Mental/Behavioral Health   Depression Screening:           Mental health DX:: Yes   Mental health DX how managed:: Medication    Falls Assessment:   Fallen 2 or more times in the past year?: No   Any fall with injury in the past year?: No    ADL/IADL Dependencies:   Dependent ADLs:: Bathing, Dressing, Grooming, Eating, Incontinence, Positioning, Transfers, Wheelchair-with assist, Toileting  Dependent IADLs:: Cleaning, Cooking, Laundry, Shopping, Meal Preparation, Medication Management, Money Management, Transportation, Incontinence      Care Plan & Recommendations: Met with Sherri at [facility], introduced self and role.  Sherri was sitting in her broda chair sleeping and was un arousable. She was dressed and well kept and had multiple blankets on her for comfort.  According to staff she is full care and rosa maria lift, she is also no verbal. She has a guardian and will reach out to her for further information.   Messaged Indy Gage  Guardian Discussed options/opportunities for transitions.Indy states \"I have no current concerns.  I have had the same opinion regarding her appearing well cared for.  I have located her granddaughter and she has been there to visit her a couple of times now as well. \"     See Institutional Care Plan for detailed assessment information.    Obtained a copy of the facility care plan and MDS from facility electronic records. Requested of [facility] social worker to put this care coordinator on care conference attendee list.    Placed the Health Plan facility face sheet in the member's facility chart.    Follow-Up Plan: Member informed of future " contact, plan to f/u with member with a 6 month assessment, attend 1 care conference annually, and will follow any hospitalizations or transitions. Care Coordinator contact information shared with member/family and facility, and encouraged to call this care coordinator with any questions or concerns at any time.     [place] care continuum providers: Please see Snapshot and Care Management Flowsheets for Specific details of care plan.    This CC note routed to PCP, Karla Villatoro.    ROSLYN Patel, RN, PHN, [facility]  Care Coordinator[place] [address]  [email or web address]   [email or web address]     Office: [phone]   Fax: [phone]

## 2023-11-08 NOTE — PROGRESS NOTES
[facility] was not notified of Care Conferences on 10/7/23  Resident s favorite programs include: [employer], sensory activities: hair brushed/light massage, sitting in sun and being outdoors, and music on the Ipad.Resident s preferred independent leisure activities include: resting as needed, observation of peers, hanging out in common area with peers, various tv shows, listening to music, bird watching out window.  Resident is receiving a reg diet with pureed texture and nectar thickened liquids. Resident is consuming % of meals; improvement in intake noted. Current weight is 113.7#. Weight loss note over the past 6 months. House supplement PRN D/C. New order to offer resident chocolate milk, soda, or juice at meals/fluids in bed. Resident is on hospice cares. Weight loss and decreased intakes expected as a natural part of the end of life process. Provide comfort foods/fluids per resident request.    ROSLYN Patel, RN, PHN, [facility]  Care Coordinator-Long Term Care  [address] [address]  [email or web address]   [email or web address]     Office: [phone]   Fax: [phone]

## 2023-12-01 ENCOUNTER — DOCUMENTATION ONLY (OUTPATIENT)
Dept: GERIATRICS | Facility: CLINIC | Age: 88
End: 2023-12-01
Payer: COMMERCIAL

## 2023-12-01 NOTE — PROGRESS NOTES
Patient  on 23 @ 0605 at [facility] on the [facility].  Patient was on [facility] hospice.

## 2023-12-04 ENCOUNTER — PATIENT OUTREACH (OUTPATIENT)
Dept: GERIATRIC MEDICINE | Facility: CLINIC | Age: 88
End: 2023-12-04
Payer: COMMERCIAL

## 2023-12-04 NOTE — PROGRESS NOTES
[facility] Care Coordination Contact    Notified by [facility] that member passed away on 11/30 at Nursing Facility.    PCP notified. Called all providers to cancel services.    For [facility]:  Death Notification form completed and faxed to [facility].    Chart reviewed and this CC's encounter closed. Chart handed off to CMS to process disenrollment tasks.    ROSLYN Patel, RN, PHN, [facility]  Care Coordinator-Long Term Care  [address] [address]  [email or web address]   [email or web address]     Office: [phone]   Fax: [phone]

## 2024-05-10 NOTE — PROGRESS NOTES
Detail Level: [facility] Hospitalist Service      Subjective:  Confused  Pt was given ativan most nights by daughter  Would get angry      Review of Systems:  unable    Physical Exam:  Vitals Were Reviewed    Patient Vitals for the past 16 hrs:   BP Temp Temp src Heart Rate Resp SpO2   01/16/18 0747 140/71 97.1  F (36.2  C) Oral 99 18 92 %   01/16/18 0730 - 97.4  F (36.3  C) Oral 60 - 100 %   01/16/18 0026 113/72 97.3  F (36.3  C) Oral 98 18 92 %         Intake/Output Summary (Last 24 hours) at 01/16/18 0900  Last data filed at 01/15/18 1800   Gross per 24 hour   Intake              560 ml   Output                0 ml   Net              560 ml       GENERAL APPEARANCE: alert, calm , confused  EYES: conjunctiva clear, eyes grossly normal  RESP: lungs clear to auscultation - no rales, rhonchi or wheezes  CV: regular rate and rhythm, normal S1 S2, no S3 or S4 and no murmur, click or rub   ABDOMEN: soft, nontender, no HSM or masses and bowel sounds normal  MS: no clubbing, cyanosis; no edema  SKIN: clear without significant rashes or lesions  NEURO:   Confused, no focal lesion    Lab:  Recent Labs   Lab Test  01/14/18   1343  09/03/15   1659   NA  140  137   POTASSIUM  3.6  3.6   CHLORIDE  106  100   CO2  24  30   ANIONGAP  10  7   GLC  142*  86   BUN  17  16   CR  0.82  0.65   ZACK  8.6  9.3     CBC RESULTS:   Recent Labs   Lab Test  01/14/18   1343  08/26/13   1841   WBC  8.2  7.7   RBC  3.79*  4.21   HGB  11.7  12.8   HCT  35.3  39.3   PLT  226  239       No results found for this or any previous visit (from the past 24 hour(s)).    Assessment and Plan:    Sherri Bender is a 85 year old female with hx dementia who presents with progressive confusion and increased caretaker needs over past 1-2 months      Dementia - progression to the point of family feeling like she needs placement. Some behavior problems gwen at night. Started on respirdal.. Cont aricept. Holding ativan. Holding Unasom.  Needed iv haldol  yesterday.     Fall- presumed mechanical, based on past hx of witnessed fall when attempting to walk unassisted- abrasions to face ( right cheek) and bilat knee- tylenol and ice prn.ck pending     HTN- cont cozaar , hctz     Depression- cont zoloft     Urinary incontinence     Chronic rhinnorhea, hx environmental alllergies- has been on claritin. Will hold for now in case claritin increasing confusion     FEN- has had poor po intakeper daughter- will start ensure with meals. Has been on one ensure a day at home      Discussion- long discussion with daughter, she has concern of Lewy Body ds. Discussed with pharm. Will try low dose seroquel during day and larger dose at night. Ativan prn. Neither antipsychotic or benzo's are optimum but may be necessary at least in short term. Will discuss with social service.